# Patient Record
Sex: FEMALE | Race: WHITE | Employment: FULL TIME | URBAN - NONMETROPOLITAN AREA
[De-identification: names, ages, dates, MRNs, and addresses within clinical notes are randomized per-mention and may not be internally consistent; named-entity substitution may affect disease eponyms.]

---

## 2017-02-27 ENCOUNTER — OFFICE VISIT (OUTPATIENT)
Dept: PHYSICAL MEDICINE AND REHAB | Age: 57
End: 2017-02-27

## 2017-02-27 VITALS
WEIGHT: 160 LBS | BODY MASS INDEX: 27.31 KG/M2 | SYSTOLIC BLOOD PRESSURE: 124 MMHG | DIASTOLIC BLOOD PRESSURE: 74 MMHG | HEART RATE: 97 BPM | HEIGHT: 64 IN

## 2017-02-27 DIAGNOSIS — G35 MULTIPLE SCLEROSIS (HCC): Primary | ICD-10-CM

## 2017-02-27 PROCEDURE — 99213 OFFICE O/P EST LOW 20 MIN: CPT | Performed by: PSYCHIATRY & NEUROLOGY

## 2017-03-06 ENCOUNTER — TELEPHONE (OUTPATIENT)
Dept: PHYSICAL MEDICINE AND REHAB | Age: 57
End: 2017-03-06

## 2017-03-06 RX ORDER — AMANTADINE HYDROCHLORIDE 100 MG/1
CAPSULE, GELATIN COATED ORAL
Qty: 180 CAPSULE | Refills: 2 | Status: SHIPPED | OUTPATIENT
Start: 2017-03-06 | End: 2017-12-01 | Stop reason: SDUPTHER

## 2017-03-13 RX ORDER — DIMETHYL FUMARATE 240 MG/1
CAPSULE ORAL
Qty: 180 CAPSULE | Refills: 1 | Status: SHIPPED | OUTPATIENT
Start: 2017-03-13 | End: 2017-09-18 | Stop reason: SDUPTHER

## 2017-03-16 DIAGNOSIS — G35 MULTIPLE SCLEROSIS (HCC): ICD-10-CM

## 2017-03-16 RX ORDER — BACLOFEN 10 MG/1
10 TABLET ORAL 3 TIMES DAILY
Qty: 270 TABLET | Refills: 2 | Status: SHIPPED | OUTPATIENT
Start: 2017-03-16 | End: 2017-08-28 | Stop reason: SDUPTHER

## 2017-08-28 ENCOUNTER — OFFICE VISIT (OUTPATIENT)
Dept: NEUROLOGY | Age: 57
End: 2017-08-28
Payer: COMMERCIAL

## 2017-08-28 VITALS
SYSTOLIC BLOOD PRESSURE: 140 MMHG | HEIGHT: 64 IN | DIASTOLIC BLOOD PRESSURE: 77 MMHG | HEART RATE: 89 BPM | BODY MASS INDEX: 27.55 KG/M2 | WEIGHT: 161.4 LBS

## 2017-08-28 DIAGNOSIS — G35 MULTIPLE SCLEROSIS (HCC): ICD-10-CM

## 2017-08-28 DIAGNOSIS — G35 MULTIPLE SCLEROSIS, RELAPSING-REMITTING (HCC): Primary | ICD-10-CM

## 2017-08-28 PROCEDURE — 99213 OFFICE O/P EST LOW 20 MIN: CPT | Performed by: PSYCHIATRY & NEUROLOGY

## 2017-08-28 RX ORDER — BACLOFEN 10 MG/1
10 TABLET ORAL 3 TIMES DAILY
Qty: 270 TABLET | Refills: 2 | Status: SHIPPED | OUTPATIENT
Start: 2017-08-28 | End: 2017-09-27 | Stop reason: SDUPTHER

## 2017-08-29 ENCOUNTER — HOSPITAL ENCOUNTER (OUTPATIENT)
Age: 57
Discharge: HOME | End: 2017-08-29
Payer: COMMERCIAL

## 2017-08-29 DIAGNOSIS — G35 MULTIPLE SCLEROSIS, RELAPSING-REMITTING (HCC): ICD-10-CM

## 2017-08-29 LAB
ALBUMIN SERPL-MCNC: 4 G/DL (ref 3.5–5.1)
ALP BLD-CCNC: 67 U/L (ref 38–126)
ALT SERPL-CCNC: 10 U/L (ref 11–66)
AST SERPL-CCNC: 14 U/L (ref 5–40)
BASOPHILS # BLD: 0.4 %
BASOPHILS ABSOLUTE: 0 THOU/MM3 (ref 0–0.1)
BILIRUB SERPL-MCNC: 0.3 MG/DL (ref 0.3–1.2)
BILIRUBIN DIRECT: < 0.2 MG/DL (ref 0–0.3)
EOSINOPHIL # BLD: 3.3 %
EOSINOPHILS ABSOLUTE: 0.2 THOU/MM3 (ref 0–0.4)
HCT VFR BLD CALC: 38.3 % (ref 37–47)
HEMOGLOBIN: 13 GM/DL (ref 12–16)
LYMPHOCYTES # BLD: 24.1 %
LYMPHOCYTES ABSOLUTE: 1.4 THOU/MM3 (ref 1–4.8)
MCH RBC QN AUTO: 31.6 PG (ref 27–31)
MCHC RBC AUTO-ENTMCNC: 34 GM/DL (ref 33–37)
MCV RBC AUTO: 93.1 FL (ref 81–99)
MONOCYTES # BLD: 9.1 %
MONOCYTES ABSOLUTE: 0.5 THOU/MM3 (ref 0.4–1.3)
NUCLEATED RED BLOOD CELLS: 0 /100 WBC
PDW BLD-RTO: 14.1 % (ref 11.5–14.5)
PLATELET # BLD: 240 THOU/MM3 (ref 130–400)
PMV BLD AUTO: 8.4 MCM (ref 7.4–10.4)
RBC # BLD: 4.11 MILL/MM3 (ref 4.2–5.4)
RBC # BLD: NORMAL 10*6/UL
SEG NEUTROPHILS: 63.1 %
SEGMENTED NEUTROPHILS ABSOLUTE COUNT: 3.6 THOU/MM3 (ref 1.8–7.7)
TOTAL PROTEIN: 6.5 G/DL (ref 6.1–8)
WBC # BLD: 5.7 THOU/MM3 (ref 4.8–10.8)

## 2017-08-29 PROCEDURE — 36415 COLL VENOUS BLD VENIPUNCTURE: CPT

## 2017-08-29 PROCEDURE — 85025 COMPLETE CBC W/AUTO DIFF WBC: CPT

## 2017-08-29 PROCEDURE — 80076 HEPATIC FUNCTION PANEL: CPT

## 2017-09-05 ENCOUNTER — HOSPITAL ENCOUNTER (OUTPATIENT)
Dept: MAMMOGRAPHY | Age: 57
Discharge: HOME | End: 2017-09-05
Payer: COMMERCIAL

## 2017-09-05 DIAGNOSIS — Z12.39 BREAST SCREENING: ICD-10-CM

## 2017-09-05 PROCEDURE — G0202 SCR MAMMO BI INCL CAD: HCPCS

## 2017-09-18 RX ORDER — DIMETHYL FUMARATE 240 MG/1
CAPSULE ORAL
Qty: 180 CAPSULE | Refills: 1 | Status: SHIPPED | OUTPATIENT
Start: 2017-09-18 | End: 2018-03-05 | Stop reason: SDUPTHER

## 2017-09-27 DIAGNOSIS — G35 MULTIPLE SCLEROSIS (HCC): ICD-10-CM

## 2017-09-27 RX ORDER — BACLOFEN 10 MG/1
10 TABLET ORAL 3 TIMES DAILY
Qty: 270 TABLET | Refills: 2 | Status: SHIPPED | OUTPATIENT
Start: 2017-09-27 | End: 2018-11-28 | Stop reason: SDUPTHER

## 2017-12-01 RX ORDER — AMANTADINE HYDROCHLORIDE 100 MG/1
CAPSULE, GELATIN COATED ORAL
Qty: 180 CAPSULE | Refills: 2 | Status: SHIPPED | OUTPATIENT
Start: 2017-12-01 | End: 2018-03-05 | Stop reason: SDUPTHER

## 2018-03-05 ENCOUNTER — TELEPHONE (OUTPATIENT)
Dept: NEUROLOGY | Age: 58
End: 2018-03-05

## 2018-03-05 ENCOUNTER — HOSPITAL ENCOUNTER (OUTPATIENT)
Age: 58
Discharge: HOME | End: 2018-03-05
Payer: COMMERCIAL

## 2018-03-05 ENCOUNTER — OFFICE VISIT (OUTPATIENT)
Dept: NEUROLOGY | Age: 58
End: 2018-03-05
Payer: COMMERCIAL

## 2018-03-05 VITALS
HEIGHT: 64 IN | SYSTOLIC BLOOD PRESSURE: 138 MMHG | WEIGHT: 158 LBS | HEART RATE: 68 BPM | DIASTOLIC BLOOD PRESSURE: 72 MMHG | BODY MASS INDEX: 26.98 KG/M2

## 2018-03-05 DIAGNOSIS — G35 MULTIPLE SCLEROSIS (HCC): Primary | ICD-10-CM

## 2018-03-05 DIAGNOSIS — G35 MULTIPLE SCLEROSIS, RELAPSING-REMITTING (HCC): Primary | Chronic | ICD-10-CM

## 2018-03-05 LAB
ALBUMIN SERPL-MCNC: 4.5 G/DL (ref 3.5–5.1)
ALP BLD-CCNC: 71 U/L (ref 38–126)
ALT SERPL-CCNC: 20 U/L (ref 11–66)
AST SERPL-CCNC: 18 U/L (ref 5–40)
BASOPHILS # BLD: 0.4 %
BASOPHILS ABSOLUTE: 0 THOU/MM3 (ref 0–0.1)
BILIRUB SERPL-MCNC: 0.4 MG/DL (ref 0.3–1.2)
BILIRUBIN DIRECT: < 0.2 MG/DL (ref 0–0.3)
EOSINOPHIL # BLD: 4.7 %
EOSINOPHILS ABSOLUTE: 0.2 THOU/MM3 (ref 0–0.4)
HCT VFR BLD CALC: 40.1 % (ref 37–47)
HEMOGLOBIN: 13.5 GM/DL (ref 12–16)
LYMPHOCYTES # BLD: 29.7 %
LYMPHOCYTES ABSOLUTE: 1.5 THOU/MM3 (ref 1–4.8)
MCH RBC QN AUTO: 31.4 PG (ref 27–31)
MCHC RBC AUTO-ENTMCNC: 33.8 GM/DL (ref 33–37)
MCV RBC AUTO: 93 FL (ref 81–99)
MONOCYTES # BLD: 8.5 %
MONOCYTES ABSOLUTE: 0.4 THOU/MM3 (ref 0.4–1.3)
NUCLEATED RED BLOOD CELLS: 0 /100 WBC
PDW BLD-RTO: 13.2 % (ref 11.5–14.5)
PLATELET # BLD: 189 THOU/MM3 (ref 130–400)
PMV BLD AUTO: 7.7 FL (ref 7.4–10.4)
RBC # BLD: 4.31 MILL/MM3 (ref 4.2–5.4)
SEG NEUTROPHILS: 56.7 %
SEGMENTED NEUTROPHILS ABSOLUTE COUNT: 2.8 THOU/MM3 (ref 1.8–7.7)
TOTAL PROTEIN: 7.1 G/DL (ref 6.1–8)
WBC # BLD: 4.9 THOU/MM3 (ref 4.8–10.8)

## 2018-03-05 PROCEDURE — 85025 COMPLETE CBC W/AUTO DIFF WBC: CPT

## 2018-03-05 PROCEDURE — 36415 COLL VENOUS BLD VENIPUNCTURE: CPT

## 2018-03-05 PROCEDURE — 80076 HEPATIC FUNCTION PANEL: CPT

## 2018-03-05 PROCEDURE — 99213 OFFICE O/P EST LOW 20 MIN: CPT | Performed by: PSYCHIATRY & NEUROLOGY

## 2018-03-05 RX ORDER — AMANTADINE HYDROCHLORIDE 100 MG/1
CAPSULE, GELATIN COATED ORAL
Qty: 180 CAPSULE | Refills: 2 | Status: SHIPPED | OUTPATIENT
Start: 2018-03-05 | End: 2018-09-10 | Stop reason: SDUPTHER

## 2018-03-05 NOTE — PATIENT INSTRUCTIONS
1. Continue with current dose of Tecfidera. Refills given. 2. CBC and hepatic panel noted. 3. Continue calcium and vitamin D.  4. Report any new events. 5.  Follow up in 6 months or sooner if needed.

## 2018-03-05 NOTE — TELEPHONE ENCOUNTER
Patient has appt today. Per Dr. Hannah Emerson, order CBC and HFP for pt to complete prior to appt. LM for pt to return call to see if she could get labs done.

## 2018-03-05 NOTE — PROGRESS NOTES
cerebellar peduncle and portions of the medial left middle cerebellar peduncle with minimal involvement of the inferior left cerebellar peduncle. There is    minimal involvement of the floor of the left aspect of the fourth ventricle, also. These abnormalities have not changed since the prior exam, and there are no areas of definite interval additional white matter lesion development. Small foci of    hyperintense T2 signal abnormalities are possibly present adjacent to the frontal horns of both lateral ventricles, though difficult to confirm. Small focus of hyperintense T2 signal abnormality also in the right frontal lobe white matter appears similar    to the prior.       Essentially stable white matter characteristics of the brain parenchyma throughout demonstrated.       Cerebral cortical surfaces are within acceptable limits. No evidence of cerebral cortical signal abnormality.       The ventricles and the extra-axial spaces are within acceptable limits.       Vascular structures are grossly unremarkable.       Limited detail of the cervical canal and spinal cord and sella appear unremarkable.       The paranasal sinuses, mastoid air cells, and middle ear cavities are grossly clear.       Previously demonstrated subcutaneous lesions of the posterior right calvarium are no longer present.           Impression   NO EVIDENCE OF ACUTE BRAIN PROCESS.       STABLE EXAMINATION WITH WHITE MATTER ABNORMALITIES THAT ARE PREDOMINANTLY INVOLVING THE WHITE MATTER OF THE POSTERIOR FOSSA, SHOWING NO EVIDENCE OF INTERVAL ADDITIONAL LESION DEVELOPMENT SINCE THE PRIOR EXAM NOR FINDINGS THAT WOULD INDICATE ACTIVE    DEMYELINATING DISEASE PLAQUE. Assessment:  1. Multiple sclerosis ([address]         The patient is doing well. She denies any new events. She has unchanged exam. She is on Tecfidera, no side effects to the medication. Her multiple sclerosis symptoms are stable.  After a detailed discussion with patient and her  we agreed on the following plan. Plan:   1. Continue with current dose of Tecfidera. Refills given. 2. CBC and hepatic panel noted from today, normal.   3. Continue calcium and vitamin D.  4. Report any new events. 5.  Follow up in 6 months or sooner if needed. Please call if any questions.      Rhys Ashby MD

## 2018-03-06 RX ORDER — DIMETHYL FUMARATE 240 MG/1
CAPSULE ORAL
Qty: 180 CAPSULE | Refills: 1 | Status: SHIPPED | OUTPATIENT
Start: 2018-03-06 | End: 2018-09-02 | Stop reason: SDUPTHER

## 2018-09-04 RX ORDER — DIMETHYL FUMARATE 240 MG/1
CAPSULE ORAL
Qty: 180 CAPSULE | Refills: 1 | Status: SHIPPED | OUTPATIENT
Start: 2018-09-04 | End: 2019-02-26 | Stop reason: SDUPTHER

## 2018-09-06 ENCOUNTER — HOSPITAL ENCOUNTER (OUTPATIENT)
Dept: MAMMOGRAPHY | Age: 58
Discharge: HOME | End: 2018-09-06
Payer: COMMERCIAL

## 2018-09-06 DIAGNOSIS — Z12.39 BREAST CANCER SCREENING: ICD-10-CM

## 2018-09-06 LAB
ALBUMIN SERPL-MCNC: 4.6 G/DL (ref 3.5–5.1)
ALP BLD-CCNC: 86 U/L (ref 38–126)
ALT SERPL-CCNC: 16 U/L (ref 11–66)
AST SERPL-CCNC: 16 U/L (ref 5–40)
BASOPHILS # BLD: 0.4 %
BASOPHILS ABSOLUTE: 0 THOU/MM3 (ref 0–0.1)
BILIRUB SERPL-MCNC: 0.4 MG/DL (ref 0.3–1.2)
BILIRUBIN DIRECT: < 0.2 MG/DL (ref 0–0.3)
EOSINOPHIL # BLD: 3.8 %
EOSINOPHILS ABSOLUTE: 0.2 THOU/MM3 (ref 0–0.4)
ERYTHROCYTE [DISTWIDTH] IN BLOOD BY AUTOMATED COUNT: 13.5 % (ref 11.5–14.5)
ERYTHROCYTE [DISTWIDTH] IN BLOOD BY AUTOMATED COUNT: 48.2 FL (ref 35–45)
HCT VFR BLD CALC: 40.3 % (ref 37–47)
HEMOGLOBIN: 12.9 GM/DL (ref 12–16)
IMMATURE GRANS (ABS): 0.01 THOU/MM3 (ref 0–0.07)
IMMATURE GRANULOCYTES: 0.2 %
LYMPHOCYTES # BLD: 34 %
LYMPHOCYTES ABSOLUTE: 1.7 THOU/MM3 (ref 1–4.8)
MCH RBC QN AUTO: 31.5 PG (ref 26–33)
MCHC RBC AUTO-ENTMCNC: 32 GM/DL (ref 32.2–35.5)
MCV RBC AUTO: 98.3 FL (ref 81–99)
MONOCYTES # BLD: 8.5 %
MONOCYTES ABSOLUTE: 0.4 THOU/MM3 (ref 0.4–1.3)
NUCLEATED RED BLOOD CELLS: 0 /100 WBC
PLATELET # BLD: 243 THOU/MM3 (ref 130–400)
PMV BLD AUTO: 9.6 FL (ref 9.4–12.4)
RBC # BLD: 4.1 MILL/MM3 (ref 4.2–5.4)
SEG NEUTROPHILS: 53.1 %
SEGMENTED NEUTROPHILS ABSOLUTE COUNT: 2.6 THOU/MM3 (ref 1.8–7.7)
TOTAL PROTEIN: 7.1 G/DL (ref 6.1–8)
WBC # BLD: 4.9 THOU/MM3 (ref 4.8–10.8)

## 2018-09-06 PROCEDURE — 85025 COMPLETE CBC W/AUTO DIFF WBC: CPT

## 2018-09-06 PROCEDURE — 80076 HEPATIC FUNCTION PANEL: CPT

## 2018-09-06 PROCEDURE — 36415 COLL VENOUS BLD VENIPUNCTURE: CPT

## 2018-09-06 PROCEDURE — 77063 BREAST TOMOSYNTHESIS BI: CPT

## 2018-09-10 ENCOUNTER — OFFICE VISIT (OUTPATIENT)
Dept: NEUROLOGY | Age: 58
End: 2018-09-10
Payer: COMMERCIAL

## 2018-09-10 VITALS
DIASTOLIC BLOOD PRESSURE: 82 MMHG | HEIGHT: 64 IN | HEART RATE: 82 BPM | SYSTOLIC BLOOD PRESSURE: 136 MMHG | WEIGHT: 158 LBS | BODY MASS INDEX: 26.98 KG/M2

## 2018-09-10 DIAGNOSIS — G35 MULTIPLE SCLEROSIS, RELAPSING-REMITTING (HCC): Primary | ICD-10-CM

## 2018-09-10 PROCEDURE — 99213 OFFICE O/P EST LOW 20 MIN: CPT | Performed by: PSYCHIATRY & NEUROLOGY

## 2018-09-10 RX ORDER — AMANTADINE HYDROCHLORIDE 100 MG/1
CAPSULE, GELATIN COATED ORAL
Qty: 180 CAPSULE | Refills: 2 | Status: SHIPPED | OUTPATIENT
Start: 2018-09-10 | End: 2019-10-28 | Stop reason: SDUPTHER

## 2018-09-10 NOTE — PATIENT INSTRUCTIONS
1.  Continue with current dose of Tecfidera. Refills given. 2. CBC and hepatic panel noted from today, normal.   3. Continue calcium and vitamin D.  4. Report any new events. 5.  Follow up in 6 months or sooner if needed.

## 2018-09-10 NOTE — PROGRESS NOTES
are within acceptable limits.       Vascular structures are grossly unremarkable.       Limited detail of the cervical canal and spinal cord and sella appear unremarkable.       The paranasal sinuses, mastoid air cells, and middle ear cavities are grossly clear.       Previously demonstrated subcutaneous lesions of the posterior right calvarium are no longer present.           Impression   NO EVIDENCE OF ACUTE BRAIN PROCESS.       STABLE EXAMINATION WITH WHITE MATTER ABNORMALITIES THAT ARE PREDOMINANTLY INVOLVING THE WHITE MATTER OF THE POSTERIOR FOSSA, SHOWING NO EVIDENCE OF INTERVAL ADDITIONAL LESION DEVELOPMENT SINCE THE PRIOR EXAM NOR FINDINGS THAT WOULD INDICATE ACTIVE    DEMYELINATING DISEASE PLAQUE. Assessment:   Diagnosis Orders   1. Multiple sclerosis, relapsing-remitting ([facility] [address]         The patient is doing well. She denies any new events. She has unchanged exam. She is on Tecfidera, no side effects to the medication. Her multiple sclerosis symptoms are stable. After a detailed discussion with patient and her  we agreed on the following plan. Plan:   1. Continue with current dose of Tecfidera. Refills given. 2. CBC and hepatic panel noted from today, normal.   3. Continue calcium and vitamin D.  4. Report any new events. 5.  Follow up in 6 months or sooner if needed. Please call if any questions.      Fabby Carter MD

## 2018-11-04 ENCOUNTER — HOSPITAL ENCOUNTER (EMERGENCY)
Age: 58
Discharge: HOME | End: 2018-11-04
Attending: FAMILY MEDICINE
Payer: COMMERCIAL

## 2018-11-04 VITALS
RESPIRATION RATE: 18 BRPM | WEIGHT: 147 LBS | OXYGEN SATURATION: 97 % | BODY MASS INDEX: 26.05 KG/M2 | DIASTOLIC BLOOD PRESSURE: 63 MMHG | HEART RATE: 112 BPM | HEIGHT: 63 IN | SYSTOLIC BLOOD PRESSURE: 128 MMHG | TEMPERATURE: 99.3 F

## 2018-11-04 DIAGNOSIS — J06.9 VIRAL URI WITH COUGH: Primary | ICD-10-CM

## 2018-11-04 PROCEDURE — 99283 EMERGENCY DEPT VISIT LOW MDM: CPT

## 2018-11-04 RX ORDER — GUAIFENESIN AND CODEINE PHOSPHATE 100; 10 MG/5ML; MG/5ML
5 SOLUTION ORAL 3 TIMES DAILY PRN
Qty: 1 BOTTLE | Refills: 0 | Status: SHIPPED | OUTPATIENT
Start: 2018-11-04 | End: 2018-11-11

## 2018-11-04 ASSESSMENT — ENCOUNTER SYMPTOMS
NAUSEA: 0
DIARRHEA: 0
SINUS PRESSURE: 0
CHEST TIGHTNESS: 0
EYE REDNESS: 0
EYE DISCHARGE: 0
SORE THROAT: 0
COLOR CHANGE: 0
BACK PAIN: 0
COUGH: 1
RHINORRHEA: 1
WHEEZING: 0
SHORTNESS OF BREATH: 0
ABDOMINAL PAIN: 0
VOMITING: 0

## 2018-11-13 ENCOUNTER — HOSPITAL ENCOUNTER (OUTPATIENT)
Age: 58
Discharge: HOME | End: 2018-11-13
Payer: COMMERCIAL

## 2018-11-13 ENCOUNTER — HOSPITAL ENCOUNTER (OUTPATIENT)
Dept: GENERAL RADIOLOGY | Age: 58
Discharge: HOME | End: 2018-11-13
Payer: COMMERCIAL

## 2018-11-13 DIAGNOSIS — R05.9 COUGH: ICD-10-CM

## 2018-11-13 PROCEDURE — 71046 X-RAY EXAM CHEST 2 VIEWS: CPT

## 2018-11-24 ENCOUNTER — HOSPITAL ENCOUNTER (OUTPATIENT)
Dept: GENERAL RADIOLOGY | Age: 58
Discharge: HOME | End: 2018-11-24
Payer: COMMERCIAL

## 2018-11-24 ENCOUNTER — HOSPITAL ENCOUNTER (OUTPATIENT)
Age: 58
Discharge: HOME | End: 2018-11-24
Payer: COMMERCIAL

## 2018-11-24 DIAGNOSIS — J18.9 PNEUMONIA DUE TO INFECTIOUS ORGANISM, UNSPECIFIED LATERALITY, UNSPECIFIED PART OF LUNG: ICD-10-CM

## 2018-11-24 PROCEDURE — 71046 X-RAY EXAM CHEST 2 VIEWS: CPT

## 2018-11-28 DIAGNOSIS — G35 MULTIPLE SCLEROSIS (HCC): ICD-10-CM

## 2018-11-28 RX ORDER — BACLOFEN 10 MG/1
10 TABLET ORAL 3 TIMES DAILY
Qty: 270 TABLET | Refills: 2 | Status: SHIPPED | OUTPATIENT
Start: 2018-11-28 | End: 2019-10-27 | Stop reason: SDUPTHER

## 2018-12-05 ENCOUNTER — TELEPHONE (OUTPATIENT)
Dept: NEUROLOGY | Age: 58
End: 2018-12-05

## 2018-12-10 ENCOUNTER — OFFICE VISIT (OUTPATIENT)
Dept: NEUROLOGY | Age: 58
End: 2018-12-10
Payer: COMMERCIAL

## 2018-12-10 VITALS
HEART RATE: 78 BPM | BODY MASS INDEX: 26.22 KG/M2 | WEIGHT: 148 LBS | HEIGHT: 63 IN | SYSTOLIC BLOOD PRESSURE: 136 MMHG | DIASTOLIC BLOOD PRESSURE: 82 MMHG

## 2018-12-10 DIAGNOSIS — G35 MULTIPLE SCLEROSIS (HCC): Primary | ICD-10-CM

## 2018-12-10 PROCEDURE — 99213 OFFICE O/P EST LOW 20 MIN: CPT | Performed by: PSYCHIATRY & NEUROLOGY

## 2018-12-14 ENCOUNTER — HOSPITAL ENCOUNTER (OUTPATIENT)
Dept: MRI IMAGING | Age: 58
Discharge: HOME | End: 2018-12-14
Payer: COMMERCIAL

## 2018-12-14 ENCOUNTER — HOSPITAL ENCOUNTER (OUTPATIENT)
Age: 58
Discharge: HOME | End: 2018-12-14
Payer: COMMERCIAL

## 2018-12-14 DIAGNOSIS — G35 MULTIPLE SCLEROSIS (HCC): ICD-10-CM

## 2018-12-14 LAB
ALBUMIN SERPL-MCNC: 4 G/DL (ref 3.5–5.1)
ALP BLD-CCNC: 73 U/L (ref 38–126)
ALT SERPL-CCNC: 14 U/L (ref 11–66)
AST SERPL-CCNC: 15 U/L (ref 5–40)
BASOPHILS # BLD: 0.4 %
BASOPHILS ABSOLUTE: 0 THOU/MM3 (ref 0–0.1)
BILIRUB SERPL-MCNC: 0.4 MG/DL (ref 0.3–1.2)
BILIRUBIN DIRECT: < 0.2 MG/DL (ref 0–0.3)
EOSINOPHIL # BLD: 3.6 %
EOSINOPHILS ABSOLUTE: 0.2 THOU/MM3 (ref 0–0.4)
ERYTHROCYTE [DISTWIDTH] IN BLOOD BY AUTOMATED COUNT: 14 % (ref 11.5–14.5)
ERYTHROCYTE [DISTWIDTH] IN BLOOD BY AUTOMATED COUNT: 50.8 FL (ref 35–45)
HCT VFR BLD CALC: 40.2 % (ref 37–47)
HEMOGLOBIN: 12.7 GM/DL (ref 12–16)
IMMATURE GRANS (ABS): 0.01 THOU/MM3 (ref 0–0.07)
IMMATURE GRANULOCYTES: 0.2 %
LYMPHOCYTES # BLD: 33.6 %
LYMPHOCYTES ABSOLUTE: 1.5 THOU/MM3 (ref 1–4.8)
MCH RBC QN AUTO: 31.5 PG (ref 26–33)
MCHC RBC AUTO-ENTMCNC: 31.6 GM/DL (ref 32.2–35.5)
MCV RBC AUTO: 99.8 FL (ref 81–99)
MONOCYTES # BLD: 10.8 %
MONOCYTES ABSOLUTE: 0.5 THOU/MM3 (ref 0.4–1.3)
NUCLEATED RED BLOOD CELLS: 0 /100 WBC
PLATELET # BLD: 234 THOU/MM3 (ref 130–400)
PMV BLD AUTO: 9.6 FL (ref 9.4–12.4)
RBC # BLD: 4.03 MILL/MM3 (ref 4.2–5.4)
SEG NEUTROPHILS: 51.4 %
SEGMENTED NEUTROPHILS ABSOLUTE COUNT: 2.3 THOU/MM3 (ref 1.8–7.7)
TOTAL PROTEIN: 6.4 G/DL (ref 6.1–8)
WBC # BLD: 4.5 THOU/MM3 (ref 4.8–10.8)

## 2018-12-14 PROCEDURE — 36415 COLL VENOUS BLD VENIPUNCTURE: CPT

## 2018-12-14 PROCEDURE — 70553 MRI BRAIN STEM W/O & W/DYE: CPT

## 2018-12-14 PROCEDURE — 80076 HEPATIC FUNCTION PANEL: CPT

## 2018-12-14 PROCEDURE — 6360000004 HC RX CONTRAST MEDICATION: Performed by: PSYCHIATRY & NEUROLOGY

## 2018-12-14 PROCEDURE — A9579 GAD-BASE MR CONTRAST NOS,1ML: HCPCS | Performed by: PSYCHIATRY & NEUROLOGY

## 2018-12-14 PROCEDURE — 85025 COMPLETE CBC W/AUTO DIFF WBC: CPT

## 2018-12-14 RX ADMIN — GADOTERIDOL 15 ML: 279.3 INJECTION, SOLUTION INTRAVENOUS at 12:58

## 2019-02-26 DIAGNOSIS — G35 MULTIPLE SCLEROSIS (HCC): Primary | ICD-10-CM

## 2019-02-26 RX ORDER — DIMETHYL FUMARATE 240 MG/1
CAPSULE ORAL
Qty: 180 CAPSULE | Refills: 1 | Status: SHIPPED | OUTPATIENT
Start: 2019-02-26 | End: 2019-09-01 | Stop reason: SDUPTHER

## 2019-06-10 ENCOUNTER — OFFICE VISIT (OUTPATIENT)
Dept: NEUROLOGY | Age: 59
End: 2019-06-10
Payer: COMMERCIAL

## 2019-06-10 VITALS
WEIGHT: 148 LBS | BODY MASS INDEX: 26.22 KG/M2 | DIASTOLIC BLOOD PRESSURE: 72 MMHG | HEIGHT: 63 IN | SYSTOLIC BLOOD PRESSURE: 118 MMHG | HEART RATE: 80 BPM

## 2019-06-10 DIAGNOSIS — G35 MULTIPLE SCLEROSIS (HCC): Primary | ICD-10-CM

## 2019-06-10 PROCEDURE — 99213 OFFICE O/P EST LOW 20 MIN: CPT | Performed by: PSYCHIATRY & NEUROLOGY

## 2019-06-10 NOTE — PROGRESS NOTES
NEUROLOGY OUT PATIENT FOLLOW UP NOTE:  6/10/657295:37 PM    Audra Cadena is here for follow up for   Patient Active Problem List   Diagnosis    Multiple sclerosis, relapsing-remitting ([facility] [address]    Follow up for Multiple sclerosis relapsing. She is doing well. No new symptoms. She is pleased with how she is doing. She is on the Tecfidera. Comes in with family. She is doing well. She is also on amantadine, baclofen. She is also on calcium and vitamin D. No falls. No new numbness. She is here to go over medications and plan. ROS:  Skin: no rash  Cardiac: no chest pain. No palpitations. Renal : no flank pain, no hematuria        No Known Allergies        PE:     Vitals:    06/10/19 1232   BP: 118/72   Site: Right Upper Arm   Position: Sitting   Cuff Size: Medium Adult   Pulse: 80   Weight: 148 lb (67.1 kg)   Height: 5' 3\" (1.6 m)        General Appearance: awake, alert, cooperative, well nourished and cooperative . Language is Intact. Neck:  The Neck is supple. Neuro: CN 2-12 left lateral gaze limitation. Otherwise, grossly intact with no focal deficits. Power mild left hemiparesis 4/5 , right 5/5 arm and leg. Reflexes are symmetric. Long tracts are intact. Cerebellar exam is Intact on the right. Sensory exam is intact to light touch. Gait is limping.      Results for orders placed or performed during the hospital encounter of 12/14/18   CBC Auto Differential   Result Value Ref Range    WBC 4.5 (L) 4.8 - 10.8 thou/mm3    RBC 4.03 (L) 4.20 - 5.40 mill/mm3    Hemoglobin 12.7 12.0 - 16.0 gm/dl    Hematocrit 40.2 37.0 - 47.0 %    MCV 99.8 (H) 81.0 - 99.0 fL    MCH 31.5 26.0 - 33.0 pg    MCHC 31.6 (L) 32.2 - 35.5 gm/dl    RDW-CV 14.0 11.5 - 14.5 %    RDW-SD 50.8 (H) 35.0 - 45.0 fL    Platelets 634 869 - 873 thou/mm3    MPV 9.6 9.4 - 12.4 fL    Seg Neutrophils 51.4 %    Lymphocytes 33.6 %    Monocytes 10.8 %    Eosinophils 3.6 %    Basophils 0.4 %    Immature Granulocytes 0.2 %    Segs Absolute 2.3 1.8 - 7.7 thou/mm3 Lymphocytes # 1.5 1.0 - 4.8 thou/mm3    Monocytes # 0.5 0.4 - 1.3 thou/mm3    Eosinophils # 0.2 0.0 - 0.4 thou/mm3    Basophils # 0.0 0.0 - 0.1 thou/mm3    Immature Grans (Abs) 0.01 0.00 - 0.07 thou/mm3    nRBC 0 /100 wbc   Hepatic Function Panel   Result Value Ref Range    Alb 4.0 3.5 - 5.1 g/dL    Total Bilirubin 0.4 0.3 - 1.2 mg/dL    Bilirubin, Direct <0.2 0.0 - 0.3 mg/dL    Alkaline Phosphatase 73 38 - 126 U/L    AST 15 5 - 40 U/L    ALT 14 11 - 66 U/L    Total Protein 6.4 6.1 - 8.0 g/dL      MRI Brain 3/2017  I reviewed the MRI brain and agree with interpretation. FINDINGS: The global brain volume is within normal limits, stable.       There are no areas of restricted diffusion. There are no areas of convincing parenchymal edema. No findings of blood products in the brain parenchyma.       No areas of convincing pathologic contrast enhancement are demonstrated in any portion of the brain parenchyma.       There are redemonstrated areas of hyperintense T2 signal abnormality involving the left superior cerebellar peduncle and portions of the medial left middle cerebellar peduncle with minimal involvement of the inferior left cerebellar peduncle. There is    minimal involvement of the floor of the left aspect of the fourth ventricle, also. These abnormalities have not changed since the prior exam, and there are no areas of definite interval additional white matter lesion development. Small foci of    hyperintense T2 signal abnormalities are possibly present adjacent to the frontal horns of both lateral ventricles, though difficult to confirm. Small focus of hyperintense T2 signal abnormality also in the right frontal lobe white matter appears similar    to the prior.       Essentially stable white matter characteristics of the brain parenchyma throughout demonstrated.       Cerebral cortical surfaces are within acceptable limits.  No evidence of cerebral cortical signal abnormality.       The ventricles and the extra-axial spaces are within acceptable limits.       Vascular structures are grossly unremarkable.       Limited detail of the cervical canal and spinal cord and sella appear unremarkable.       The paranasal sinuses, mastoid air cells, and middle ear cavities are grossly clear.       Previously demonstrated subcutaneous lesions of the posterior right calvarium are no longer present.           Impression   NO EVIDENCE OF ACUTE BRAIN PROCESS.       STABLE EXAMINATION WITH WHITE MATTER ABNORMALITIES THAT ARE PREDOMINANTLY INVOLVING THE WHITE MATTER OF THE POSTERIOR FOSSA, SHOWING NO EVIDENCE OF INTERVAL ADDITIONAL LESION DEVELOPMENT SINCE THE PRIOR EXAM NOR FINDINGS THAT WOULD INDICATE ACTIVE    DEMYELINATING DISEASE PLAQUE. MRI Brain 12/14/2019:     Impression       1. Minimal amount of signal hyperintensity in the white matter the brain this is stable. No evidence of active demyelination.                 Assessment:   Diagnosis Orders   1. Multiple sclerosis ([address]          She reports doing well. No side effects to the medications. Her exam is unchanged. She got better after increasing the Baclofen to 2 per day. She is on the Tecfidera. She denies any new events. She has unchanged exam. Her multiple sclerosis symptoms are stable. After a detailed discussion with patient and her  we agreed on the following plan. Plan:   1. Continue with current dose of Tecfidera. Refills given. 2. CBC and hepatic panel noted from today, normal.   3. Continue calcium and vitamin D.  4. Report any new events. 5.  Follow up in 6 months or sooner if needed. Please call if any questions.      Waqas Benitez MD

## 2019-08-07 ENCOUNTER — TELEPHONE (OUTPATIENT)
Dept: FAMILY MEDICINE CLINIC | Age: 59
End: 2019-08-07

## 2019-08-07 DIAGNOSIS — Z00.00 ROUTINE GENERAL MEDICAL EXAMINATION AT A HEALTH CARE FACILITY: Primary | ICD-10-CM

## 2019-09-01 DIAGNOSIS — G35 MULTIPLE SCLEROSIS (HCC): Primary | ICD-10-CM

## 2019-09-05 ENCOUNTER — NURSE ONLY (OUTPATIENT)
Dept: LAB | Age: 59
End: 2019-09-05

## 2019-09-05 DIAGNOSIS — Z00.00 ROUTINE GENERAL MEDICAL EXAMINATION AT A HEALTH CARE FACILITY: ICD-10-CM

## 2019-09-05 DIAGNOSIS — G35 MULTIPLE SCLEROSIS (HCC): ICD-10-CM

## 2019-09-05 LAB
BASOPHILS # BLD: 0.6 %
BASOPHILS ABSOLUTE: 0 THOU/MM3 (ref 0–0.1)
EOSINOPHIL # BLD: 4.2 %
EOSINOPHILS ABSOLUTE: 0.2 THOU/MM3 (ref 0–0.4)
ERYTHROCYTE [DISTWIDTH] IN BLOOD BY AUTOMATED COUNT: 13.3 % (ref 11.5–14.5)
ERYTHROCYTE [DISTWIDTH] IN BLOOD BY AUTOMATED COUNT: 46.9 FL (ref 35–45)
HCT VFR BLD CALC: 41.4 % (ref 37–47)
HEMOGLOBIN: 13.2 GM/DL (ref 12–16)
IMMATURE GRANS (ABS): 0.01 THOU/MM3 (ref 0–0.07)
IMMATURE GRANULOCYTES: 0 %
LYMPHOCYTES # BLD: 39.3 %
LYMPHOCYTES ABSOLUTE: 1.9 THOU/MM3 (ref 1–4.8)
MCH RBC QN AUTO: 30.6 PG (ref 26–33)
MCHC RBC AUTO-ENTMCNC: 31.9 GM/DL (ref 32.2–35.5)
MCV RBC AUTO: 96.1 FL (ref 81–99)
MONOCYTES # BLD: 9 %
MONOCYTES ABSOLUTE: 0.4 THOU/MM3 (ref 0.4–1.3)
NUCLEATED RED BLOOD CELLS: 0 /100 WBC
PLATELET # BLD: 241 THOU/MM3 (ref 130–400)
PMV BLD AUTO: 9.7 FL (ref 9.4–12.4)
RBC # BLD: 4.31 MILL/MM3 (ref 4.2–5.4)
SEG NEUTROPHILS: 46.7 %
SEGMENTED NEUTROPHILS ABSOLUTE COUNT: 2.2 THOU/MM3 (ref 1.8–7.7)
WBC # BLD: 4.8 THOU/MM3 (ref 4.8–10.8)

## 2019-09-06 ENCOUNTER — HOSPITAL ENCOUNTER (OUTPATIENT)
Dept: MAMMOGRAPHY | Age: 59
Discharge: HOME | End: 2019-09-06
Payer: COMMERCIAL

## 2019-09-06 DIAGNOSIS — Z12.39 BREAST SCREENING: ICD-10-CM

## 2019-09-06 LAB
ALBUMIN SERPL-MCNC: 4.5 G/DL (ref 3.5–5.1)
ALP BLD-CCNC: 80 U/L (ref 38–126)
ALT SERPL-CCNC: 12 U/L (ref 11–66)
ANION GAP SERPL CALCULATED.3IONS-SCNC: 12 MEQ/L (ref 8–16)
AST SERPL-CCNC: 15 U/L (ref 5–40)
BILIRUB SERPL-MCNC: 0.3 MG/DL (ref 0.3–1.2)
BILIRUBIN DIRECT: < 0.2 MG/DL (ref 0–0.3)
BUN BLDV-MCNC: 12 MG/DL (ref 7–22)
CALCIUM SERPL-MCNC: 9.9 MG/DL (ref 8.5–10.5)
CHLORIDE BLD-SCNC: 105 MEQ/L (ref 98–111)
CHOLESTEROL, TOTAL: 149 MG/DL (ref 100–199)
CO2: 28 MEQ/L (ref 23–33)
CREAT SERPL-MCNC: 0.5 MG/DL (ref 0.4–1.2)
GFR SERPL CREATININE-BSD FRML MDRD: > 90 ML/MIN/1.73M2
GLUCOSE BLD-MCNC: 93 MG/DL (ref 70–108)
HDLC SERPL-MCNC: 89 MG/DL
LDL CHOLESTEROL CALCULATED: 54 MG/DL
POTASSIUM SERPL-SCNC: 4.2 MEQ/L (ref 3.5–5.2)
SODIUM BLD-SCNC: 145 MEQ/L (ref 135–145)
TOTAL PROTEIN: 6.8 G/DL (ref 6.1–8)
TRIGL SERPL-MCNC: 30 MG/DL (ref 0–199)
TSH SERPL DL<=0.05 MIU/L-ACNC: 0.92 UIU/ML (ref 0.4–4.2)

## 2019-09-06 RX ORDER — DIMETHYL FUMARATE 240 MG/1
CAPSULE ORAL
Qty: 180 CAPSULE | Refills: 1 | Status: SHIPPED | OUTPATIENT
Start: 2019-09-06 | End: 2020-01-03 | Stop reason: SDUPTHER

## 2019-09-11 ENCOUNTER — HOSPITAL ENCOUNTER (OUTPATIENT)
Dept: MAMMOGRAPHY | Age: 59
Discharge: HOME | End: 2019-09-11
Payer: COMMERCIAL

## 2019-09-11 ENCOUNTER — OFFICE VISIT (OUTPATIENT)
Dept: FAMILY MEDICINE CLINIC | Age: 59
End: 2019-09-11
Payer: COMMERCIAL

## 2019-09-11 VITALS
HEIGHT: 63 IN | SYSTOLIC BLOOD PRESSURE: 118 MMHG | HEART RATE: 94 BPM | WEIGHT: 162.8 LBS | DIASTOLIC BLOOD PRESSURE: 82 MMHG | OXYGEN SATURATION: 98 % | BODY MASS INDEX: 28.84 KG/M2

## 2019-09-11 DIAGNOSIS — Z12.39 SCREENING BREAST EXAMINATION: ICD-10-CM

## 2019-09-11 DIAGNOSIS — G89.29 CHRONIC RIGHT SHOULDER PAIN: ICD-10-CM

## 2019-09-11 DIAGNOSIS — Z00.00 ROUTINE GENERAL MEDICAL EXAMINATION AT A HEALTH CARE FACILITY: Primary | ICD-10-CM

## 2019-09-11 DIAGNOSIS — G35 MULTIPLE SCLEROSIS, RELAPSING-REMITTING (HCC): ICD-10-CM

## 2019-09-11 DIAGNOSIS — M25.511 CHRONIC RIGHT SHOULDER PAIN: ICD-10-CM

## 2019-09-11 PROCEDURE — 77063 BREAST TOMOSYNTHESIS BI: CPT

## 2019-09-11 PROCEDURE — 99204 OFFICE O/P NEW MOD 45 MIN: CPT | Performed by: FAMILY MEDICINE

## 2019-09-11 RX ORDER — CYCLOBENZAPRINE HCL 10 MG
TABLET ORAL
COMMUNITY
Start: 2019-08-20 | End: 2021-04-13 | Stop reason: SDUPTHER

## 2019-09-11 ASSESSMENT — PATIENT HEALTH QUESTIONNAIRE - PHQ9
SUM OF ALL RESPONSES TO PHQ QUESTIONS 1-9: 0
2. FEELING DOWN, DEPRESSED OR HOPELESS: 0
1. LITTLE INTEREST OR PLEASURE IN DOING THINGS: 0
SUM OF ALL RESPONSES TO PHQ9 QUESTIONS 1 & 2: 0
SUM OF ALL RESPONSES TO PHQ QUESTIONS 1-9: 0

## 2019-09-11 NOTE — PROGRESS NOTES
118/82   Site: Left Upper Arm   Position: Sitting   Cuff Size: Medium Adult   Pulse: 94   SpO2: 98%   Weight: 162 lb 12.8 oz (73.8 kg)   Height: 5' 3\" (1.6 m)     Wt Readings from Last 3 Encounters:   09/11/19 162 lb 12.8 oz (73.8 kg)   06/10/19 148 lb (67.1 kg)   12/10/18 148 lb (67.1 kg)       Physical Exam  Constitutional: Vital signs are normal. She appears well-developed and well-nourished. She is active. HENT:   Head: Normocephalic and atraumatic. Right Ear: Tympanic membrane, external ear and ear canal normal. No drainage or tenderness. Left Ear: Tympanic membrane, external ear and ear canal normal. No drainage or tenderness. Nose: Nose normal. No mucosal edema or rhinorrhea. Mouth/Throat: Uvula is midline, oropharynx is clear and moist and mucous membranes are normal. Mucous membranes are not pale. Normal dentition. No posterior oropharyngeal edema or posterior oropharyngeal erythema. Eyes: Lids are normal. Right eye exhibits no chemosis and no discharge. Left eye exhibits no chemosis and no drainage. Right conjunctiva has no hemorrhage. Left conjunctiva has no hemorrhage. Right eye exhibits normal extraocular motion. Left eye exhibits normal extraocular motion. Right pupil is round and reactive. Left pupil is round and reactive. Pupils are equal.   Cardiovascular: Normal rate, regular rhythm, S1 normal, S2 normal and normal heart sounds. Exam reveals no gallop. No murmur heard. Pulmonary/Chest: Effort normal and breath sounds normal. No respiratory distress. She has no wheezes. She has no rhonchi. She has no rales. Abdominal: Soft. Normal appearance and bowel sounds are normal. She exhibits no distension and no mass. There is no hepatosplenomegaly. No tenderness. She has no rigidity, no rebound and no guarding. No hernia. Musculoskeletal:        Right lower leg: She exhibits no edema. Left lower leg: She exhibits no edema. Neurological: She is alert.            Assessment/Plan

## 2019-10-14 ENCOUNTER — TELEPHONE (OUTPATIENT)
Dept: FAMILY MEDICINE CLINIC | Age: 59
End: 2019-10-14

## 2019-10-27 DIAGNOSIS — G35 MULTIPLE SCLEROSIS (HCC): Primary | ICD-10-CM

## 2019-10-28 DIAGNOSIS — G35 MULTIPLE SCLEROSIS (HCC): Primary | ICD-10-CM

## 2019-10-28 RX ORDER — BACLOFEN 10 MG/1
TABLET ORAL
Qty: 270 TABLET | Refills: 2 | Status: SHIPPED | OUTPATIENT
Start: 2019-10-28 | End: 2020-01-23 | Stop reason: SDUPTHER

## 2019-10-28 RX ORDER — AMANTADINE HYDROCHLORIDE 100 MG/1
CAPSULE, GELATIN COATED ORAL
Qty: 180 CAPSULE | Refills: 2 | Status: SHIPPED | OUTPATIENT
Start: 2019-10-28 | End: 2020-01-23 | Stop reason: SDUPTHER

## 2019-10-31 ENCOUNTER — TELEPHONE (OUTPATIENT)
Dept: NEUROLOGY | Age: 59
End: 2019-10-31

## 2019-11-04 ENCOUNTER — OFFICE VISIT (OUTPATIENT)
Dept: NEUROLOGY | Age: 59
End: 2019-11-04
Payer: COMMERCIAL

## 2019-11-04 VITALS
BODY MASS INDEX: 28.6 KG/M2 | HEART RATE: 88 BPM | DIASTOLIC BLOOD PRESSURE: 70 MMHG | HEIGHT: 63 IN | SYSTOLIC BLOOD PRESSURE: 112 MMHG | WEIGHT: 161.4 LBS

## 2019-11-04 DIAGNOSIS — G35 MULTIPLE SCLEROSIS (HCC): Primary | ICD-10-CM

## 2019-11-04 PROCEDURE — 99213 OFFICE O/P EST LOW 20 MIN: CPT | Performed by: PSYCHIATRY & NEUROLOGY

## 2019-11-04 RX ORDER — GABAPENTIN 300 MG/1
300 CAPSULE ORAL DAILY
COMMUNITY
Start: 2019-10-29 | End: 2020-05-27 | Stop reason: SDUPTHER

## 2019-11-14 ENCOUNTER — HOSPITAL ENCOUNTER (OUTPATIENT)
Dept: MRI IMAGING | Age: 59
Discharge: HOME | End: 2019-11-14
Payer: COMMERCIAL

## 2019-11-14 DIAGNOSIS — G35 MULTIPLE SCLEROSIS (HCC): ICD-10-CM

## 2019-11-14 PROCEDURE — 6360000004 HC RX CONTRAST MEDICATION: Performed by: PSYCHIATRY & NEUROLOGY

## 2019-11-14 PROCEDURE — 70553 MRI BRAIN STEM W/O & W/DYE: CPT

## 2019-11-14 PROCEDURE — A9579 GAD-BASE MR CONTRAST NOS,1ML: HCPCS | Performed by: PSYCHIATRY & NEUROLOGY

## 2019-11-14 RX ADMIN — GADOTERIDOL 14 ML: 279.3 INJECTION, SOLUTION INTRAVENOUS at 13:25

## 2020-01-03 ENCOUNTER — HOSPITAL ENCOUNTER (EMERGENCY)
Age: 60
Discharge: HOME | End: 2020-01-03
Attending: EMERGENCY MEDICINE
Payer: COMMERCIAL

## 2020-01-03 VITALS
DIASTOLIC BLOOD PRESSURE: 74 MMHG | OXYGEN SATURATION: 96 % | TEMPERATURE: 96.4 F | SYSTOLIC BLOOD PRESSURE: 159 MMHG | RESPIRATION RATE: 16 BRPM | HEART RATE: 98 BPM

## 2020-01-03 LAB
AMORPHOUS: ABNORMAL
BACTERIA: ABNORMAL
BILIRUBIN URINE: NEGATIVE
BLOOD, URINE: ABNORMAL
CASTS UA: ABNORMAL /LPF
CHARACTER, URINE: ABNORMAL
COLOR: YELLOW
CRYSTALS, UA: ABNORMAL
EPITHELIAL CELLS, UA: ABNORMAL /HPF
GLUCOSE, URINE: NEGATIVE MG/DL
KETONES, URINE: 15
LEUKOCYTE ESTERASE, URINE: ABNORMAL
MUCUS: ABNORMAL
NITRITE, URINE: NEGATIVE
PH UA: 6.5 (ref 5–9)
PROTEIN UA: >= 300 MG/DL
RBC UA: ABNORMAL /HPF
REFLEX TO URINE C & S: ABNORMAL
SPECIFIC GRAVITY UA: 1.02 (ref 1–1.03)
UROBILINOGEN, URINE: 0.2 EU/DL (ref 0–1)
WBC UA: ABNORMAL /HPF

## 2020-01-03 PROCEDURE — 87186 SC STD MICRODIL/AGAR DIL: CPT

## 2020-01-03 PROCEDURE — 6370000000 HC RX 637 (ALT 250 FOR IP): Performed by: EMERGENCY MEDICINE

## 2020-01-03 PROCEDURE — 81001 URINALYSIS AUTO W/SCOPE: CPT

## 2020-01-03 PROCEDURE — 87077 CULTURE AEROBIC IDENTIFY: CPT

## 2020-01-03 PROCEDURE — 87086 URINE CULTURE/COLONY COUNT: CPT

## 2020-01-03 PROCEDURE — 99283 EMERGENCY DEPT VISIT LOW MDM: CPT

## 2020-01-03 RX ORDER — CIPROFLOXACIN 500 MG/1
500 TABLET, FILM COATED ORAL ONCE
Status: COMPLETED | OUTPATIENT
Start: 2020-01-03 | End: 2020-01-03

## 2020-01-03 RX ORDER — CIPROFLOXACIN 250 MG/1
250 TABLET, FILM COATED ORAL 2 TIMES DAILY
Qty: 14 TABLET | Refills: 0 | Status: SHIPPED | OUTPATIENT
Start: 2020-01-03 | End: 2020-01-10

## 2020-01-03 RX ORDER — DALFAMPRIDINE 10 MG/1
10 TABLET, FILM COATED, EXTENDED RELEASE ORAL EVERY 12 HOURS
Qty: 60 TABLET | Refills: 3 | Status: SHIPPED | OUTPATIENT
Start: 2020-01-03 | End: 2020-04-22

## 2020-01-03 RX ORDER — DIMETHYL FUMARATE 240 MG/1
CAPSULE ORAL
Qty: 180 CAPSULE | Refills: 0 | Status: SHIPPED | OUTPATIENT
Start: 2020-01-03 | End: 2020-03-06

## 2020-01-03 RX ADMIN — CIPROFLOXACIN 500 MG: 500 TABLET, FILM COATED ORAL at 15:50

## 2020-01-03 ASSESSMENT — ENCOUNTER SYMPTOMS
WHEEZING: 0
SORE THROAT: 0
ABDOMINAL PAIN: 0
DIARRHEA: 0
BACK PAIN: 0
SHORTNESS OF BREATH: 0
VOMITING: 0
BLOOD IN STOOL: 0

## 2020-01-03 ASSESSMENT — PAIN DESCRIPTION - PAIN TYPE: TYPE: ACUTE PAIN

## 2020-01-03 ASSESSMENT — PAIN DESCRIPTION - LOCATION: LOCATION: PELVIS

## 2020-01-03 ASSESSMENT — PAIN DESCRIPTION - DESCRIPTORS: DESCRIPTORS: BURNING

## 2020-01-03 ASSESSMENT — PAIN SCALES - GENERAL: PAINLEVEL_OUTOF10: 5

## 2020-01-03 NOTE — ED PROVIDER NOTES
alert.   Nursing note and vitals reviewed. DIAGNOSTIC RESULTS       LABS:   Labs Reviewed   URINE RT REFLEX TO CULTURE - Abnormal; Notable for the following components:       Result Value    Blood, Urine LARGE (*)     Leukocyte Esterase, Urine TRACE (*)     Character, Urine TURBID (*)     All other components within normal limits   URINE CULTURE    Narrative:     Source: urine, clean catch       Site:           Current Antibiotics: none   URINE CULTURE   Urinalysis showed  mild UTI    EMERGENCY DEPARTMENT COURSE:   Vitals:    Vitals:    01/03/20 1453   BP: (!) 159/74   Pulse: 98   Resp: 16   Temp: 96.4 °F (35.8 °C)   SpO2: 96%     Patient received Cipro 500 mg p.o.    BP (!) 159/74   Pulse 98   Temp 96.4 °F (35.8 °C)   Resp 16   SpO2 96%   Your blood pressure was elevated today which is not uncommon in the emergency department. Please have this rechecked by your primary care provider to assure that you are not prehypertensive or hypertensive. FINAL IMPRESSION      1. Acute cystitis without hematuria          DISPOSITION/PLAN   She was discharged home in good condition, she was given discharge instructions and was advised to return if worse or new symptoms.     PATIENT REFERRED TO:  [address], [address]  [address] [place] [address]  [phone]    In 2 days        DISCHARGE MEDICATIONS:  New Prescriptions    CIPROFLOXACIN (CIPRO) 250 MG TABLET    Take 1 tablet by mouth 2 times daily for 7 days    DALFAMPRIDINE (AMPYRA) 10 MG EXTENDED RELEASE TABLET    Take 1 tablet by mouth every 12 hours       (Please note that portions of this note were completed with a voice recognition program.  Efforts were made to edit the dictations but occasionally words are mis-transcribed.)    MD Stephanie De León MD  01/03/20 Jaylen Umanzor MD  01/03/20 [id number]

## 2020-01-03 NOTE — ED NOTES
Bedside report given to CATARINO [facility] RUBINN.      Chandan Franco RN  01/03/20 4614

## 2020-01-03 NOTE — ED NOTES
Dark, veronica cloudy urine collected per clean catch and sent to lab.      Lauro Cho LPN  97/84/31 0915

## 2020-01-05 LAB
ORGANISM: ABNORMAL
URINE CULTURE, ROUTINE: ABNORMAL

## 2020-01-06 ENCOUNTER — TELEPHONE (OUTPATIENT)
Dept: FAMILY MEDICINE CLINIC | Age: 60
End: 2020-01-06

## 2020-01-23 RX ORDER — AMANTADINE HYDROCHLORIDE 100 MG/1
100 CAPSULE, GELATIN COATED ORAL 2 TIMES DAILY
Qty: 180 CAPSULE | Refills: 2 | Status: SHIPPED | OUTPATIENT
Start: 2020-01-23 | End: 2020-01-30 | Stop reason: SDUPTHER

## 2020-01-23 RX ORDER — BACLOFEN 10 MG/1
TABLET ORAL
Qty: 270 TABLET | Refills: 2 | Status: SHIPPED | OUTPATIENT
Start: 2020-01-23 | End: 2020-01-30 | Stop reason: SDUPTHER

## 2020-01-30 RX ORDER — BACLOFEN 10 MG/1
TABLET ORAL
Qty: 270 TABLET | Refills: 2 | Status: SHIPPED | OUTPATIENT
Start: 2020-01-30 | End: 2021-02-08

## 2020-01-30 RX ORDER — AMANTADINE HYDROCHLORIDE 100 MG/1
100 CAPSULE, GELATIN COATED ORAL 2 TIMES DAILY
Qty: 180 CAPSULE | Refills: 2 | Status: SHIPPED | OUTPATIENT
Start: 2020-01-30 | End: 2021-02-19 | Stop reason: SDUPTHER

## 2020-03-05 ENCOUNTER — NURSE ONLY (OUTPATIENT)
Dept: LAB | Age: 60
End: 2020-03-05

## 2020-03-06 LAB
ALBUMIN SERPL-MCNC: 4.5 G/DL (ref 3.5–5.1)
ALP BLD-CCNC: 77 U/L (ref 38–126)
ALT SERPL-CCNC: 13 U/L (ref 11–66)
AST SERPL-CCNC: 17 U/L (ref 5–40)
BASOPHILS # BLD: 0.6 %
BASOPHILS ABSOLUTE: 0 THOU/MM3 (ref 0–0.1)
BILIRUB SERPL-MCNC: 0.3 MG/DL (ref 0.3–1.2)
BILIRUBIN DIRECT: < 0.2 MG/DL (ref 0–0.3)
EOSINOPHIL # BLD: 4.1 %
EOSINOPHILS ABSOLUTE: 0.2 THOU/MM3 (ref 0–0.4)
ERYTHROCYTE [DISTWIDTH] IN BLOOD BY AUTOMATED COUNT: 12.9 % (ref 11.5–14.5)
ERYTHROCYTE [DISTWIDTH] IN BLOOD BY AUTOMATED COUNT: 46.5 FL (ref 35–45)
HCT VFR BLD CALC: 41.9 % (ref 37–47)
HEMOGLOBIN: 13.1 GM/DL (ref 12–16)
IMMATURE GRANS (ABS): 0 THOU/MM3 (ref 0–0.07)
IMMATURE GRANULOCYTES: 0 %
LYMPHOCYTES # BLD: 35.4 %
LYMPHOCYTES ABSOLUTE: 1.7 THOU/MM3 (ref 1–4.8)
MCH RBC QN AUTO: 30.8 PG (ref 26–33)
MCHC RBC AUTO-ENTMCNC: 31.3 GM/DL (ref 32.2–35.5)
MCV RBC AUTO: 98.6 FL (ref 81–99)
MONOCYTES # BLD: 9.5 %
MONOCYTES ABSOLUTE: 0.5 THOU/MM3 (ref 0.4–1.3)
NUCLEATED RED BLOOD CELLS: 0 /100 WBC
PLATELET # BLD: 255 THOU/MM3 (ref 130–400)
PMV BLD AUTO: 10.2 FL (ref 9.4–12.4)
RBC # BLD: 4.25 MILL/MM3 (ref 4.2–5.4)
SEG NEUTROPHILS: 50.4 %
SEGMENTED NEUTROPHILS ABSOLUTE COUNT: 2.5 THOU/MM3 (ref 1.8–7.7)
TOTAL PROTEIN: 7 G/DL (ref 6.1–8)
WBC # BLD: 4.9 THOU/MM3 (ref 4.8–10.8)

## 2020-03-06 RX ORDER — DIMETHYL FUMARATE 240 MG/1
CAPSULE ORAL
Qty: 180 CAPSULE | Refills: 1 | Status: SHIPPED | OUTPATIENT
Start: 2020-03-06 | End: 2020-05-26

## 2020-04-22 ENCOUNTER — VIRTUAL VISIT (OUTPATIENT)
Dept: NEUROLOGY | Age: 60
End: 2020-04-22
Payer: COMMERCIAL

## 2020-04-22 PROCEDURE — 99213 OFFICE O/P EST LOW 20 MIN: CPT | Performed by: PSYCHIATRY & NEUROLOGY

## 2020-04-22 NOTE — PROGRESS NOTES
person/place/time [x]Able to follow commands      Eyes:  EOM    [x]  Normal  [] Abnormal-  Sclera  [x]  Normal  [] Abnormal -         Discharge [x]  None visible  [] Abnormal -    HENT:   [x] Normocephalic, atraumatic. [] Abnormal   [x] Mouth/Throat: Mucous membranes are moist.     External Ears [x] Normal  [] Abnormal-     Neck: [x] No visualized mass     Pulmonary/Chest: [x] Respiratory effort normal.  [x] No visualized signs of difficulty breathing or respiratory distress        [] Abnormal-      Musculoskeletal:   [] Normal gait with no signs of ataxia         [x] Normal range of motion of neck        [] Abnormal-       Neurological:        [x] No Facial Asymmetry (Cranial nerve 7 motor function) (limited exam to video visit)          [x] No gaze palsy        [] Abnormal-         Skin:        [x] No significant exanthematous lesions or discoloration noted on facial skin         [] Abnormal-            Psychiatric:       [x] Normal Affect [x] No Hallucinations        [] Abnormal-     Other pertinent observable physical exam findings-   MRI Brain 11/2019:  I reviewed the MRI  brain and agree with interpretation. FINDINGS:       Current lesion burden of white matter: Very mild   Interval since the most recent exam: 12 months. Interval new lesion development: None. Number of pathologically enhancing lesions of the white matter: None.       The diffusion-weighted images are normal. The brain volume is normal.There are no intra-or extra-axial collections. Edda Clock is no hydrocephalus, midline shift or mass effect.           On the FLAIR and T2-weighted sequences, there is some faint signal hyperintensity in the dorsal juan carlos and around the fourth ventricle. There is minimal abnormal signal scattered in the white matter of the brain. No new white matter lesions are present.       On the gradient echo T2-weighted images, there is mineralization in the medial aspects of the basal ganglia.  No other areas of homes.    --Val Barrientos MD on 4/22/2020 at 1:46 PM    An electronic signature was used to authenticate this note.

## 2020-04-22 NOTE — PATIENT INSTRUCTIONS
1. Continue Tecfidera 240 mg twice a day  2. CBC hepatic panel 10/2020  3. Take calcium and vitamin D supplements  4. Stay active  5. Ampyra discontinued due to side effects  6. Report any new symptoms  7.  Follow-up 11/2020

## 2020-05-26 RX ORDER — DIMETHYL FUMARATE 240 MG/1
CAPSULE ORAL
Qty: 60 CAPSULE | Refills: 3 | Status: SHIPPED | OUTPATIENT
Start: 2020-05-26 | End: 2020-09-14

## 2020-05-27 RX ORDER — GABAPENTIN 300 MG/1
300 CAPSULE ORAL DAILY
Qty: 30 CAPSULE | Refills: 3 | Status: SHIPPED | OUTPATIENT
Start: 2020-05-27 | End: 2020-09-24 | Stop reason: SDUPTHER

## 2020-09-08 NOTE — TELEPHONE ENCOUNTER
Left voice message for patient to get labs drawn prior to refill. Sent mychart message to inform of above.

## 2020-09-12 LAB
ABSOLUTE BASO #: 0 X10E9/L (ref 0–0.2)
ABSOLUTE EOS #: 0.3 X10E9/L (ref 0–0.4)
ABSOLUTE LYMPH #: 2.1 X10E9/L (ref 1–3.5)
ABSOLUTE MONO #: 0.5 X10E9/L (ref 0–0.9)
ABSOLUTE NEUT #: 3.5 X10E9/L (ref 1.5–6.6)
ALBUMIN SERPL-MCNC: 4.6 G/DL (ref 3.2–5.3)
ALK PHOSPHATASE: 75 U/L (ref 39–130)
ALT SERPL-CCNC: 14 U/L (ref 0–31)
AST SERPL-CCNC: 15 U/L (ref 0–41)
BASOPHILS RELATIVE PERCENT: 0.7 %
BILIRUB SERPL-MCNC: 0.6 MG/DL (ref 0.3–1.2)
BILIRUBIN DIRECT: 0.1 MG/DL (ref 0–0.4)
EOSINOPHILS RELATIVE PERCENT: 4.8 %
HCT VFR BLD CALC: 40.5 % (ref 35–47)
HEMOGLOBIN: 13.5 G/DL (ref 11.7–15.5)
LYMPHOCYTE %: 32.8 %
MCH RBC QN AUTO: 30.7 PG (ref 27–34)
MCHC RBC AUTO-ENTMCNC: 33.3 G/DL (ref 32–36)
MCV RBC AUTO: 92 FL (ref 80–100)
MONOCYTES # BLD: 8.4 %
NEUTROPHILS RELATIVE PERCENT: 53.3 %
PDW BLD-RTO: 13.4 % (ref 11.5–15)
PLATELETS: 241 X10E9/L (ref 150–450)
PMV BLD AUTO: 7.9 FL (ref 7–12)
RBC: 4.39 X10E12/L (ref 3.8–5.2)
TOTAL PROTEIN: 7.3 G/DL (ref 6–8)
WBC: 6.6 X10E9/L (ref 4–11)

## 2020-09-14 RX ORDER — DIMETHYL FUMARATE 240 MG/1
CAPSULE ORAL
Qty: 60 CAPSULE | Refills: 5 | Status: SHIPPED | OUTPATIENT
Start: 2020-09-14 | End: 2021-02-19 | Stop reason: SDUPTHER

## 2020-09-15 ENCOUNTER — HOSPITAL ENCOUNTER (OUTPATIENT)
Dept: MAMMOGRAPHY | Age: 60
Discharge: HOME | End: 2020-09-15
Payer: COMMERCIAL

## 2020-09-15 PROCEDURE — 77063 BREAST TOMOSYNTHESIS BI: CPT

## 2020-09-24 RX ORDER — GABAPENTIN 300 MG/1
300 CAPSULE ORAL DAILY
Qty: 30 CAPSULE | Refills: 3 | Status: SHIPPED | OUTPATIENT
Start: 2020-09-24 | End: 2021-04-13 | Stop reason: SDUPTHER

## 2020-11-15 ENCOUNTER — HOSPITAL ENCOUNTER (EMERGENCY)
Age: 60
Discharge: HOME | End: 2020-11-15
Attending: EMERGENCY MEDICINE
Payer: COMMERCIAL

## 2020-11-15 ENCOUNTER — APPOINTMENT (OUTPATIENT)
Dept: GENERAL RADIOLOGY | Age: 60
End: 2020-11-15
Payer: COMMERCIAL

## 2020-11-15 VITALS
BODY MASS INDEX: 26.46 KG/M2 | SYSTOLIC BLOOD PRESSURE: 142 MMHG | WEIGHT: 155 LBS | HEIGHT: 64 IN | DIASTOLIC BLOOD PRESSURE: 85 MMHG | OXYGEN SATURATION: 98 % | HEART RATE: 101 BPM | RESPIRATION RATE: 14 BRPM | TEMPERATURE: 98.2 F

## 2020-11-15 PROCEDURE — 99283 EMERGENCY DEPT VISIT LOW MDM: CPT

## 2020-11-15 PROCEDURE — 2709999900 HC NON-CHARGEABLE SUPPLY

## 2020-11-15 PROCEDURE — 73660 X-RAY EXAM OF TOE(S): CPT

## 2020-11-15 ASSESSMENT — PAIN DESCRIPTION - LOCATION
LOCATION: TOE (COMMENT WHICH ONE)
LOCATION: TOE (COMMENT WHICH ONE)

## 2020-11-15 ASSESSMENT — PAIN DESCRIPTION - ORIENTATION
ORIENTATION: LEFT
ORIENTATION: LEFT

## 2020-11-15 ASSESSMENT — PAIN SCALES - GENERAL
PAINLEVEL_OUTOF10: 8
PAINLEVEL_OUTOF10: 4

## 2020-11-15 NOTE — ED PROVIDER NOTES
[address]  [address]  [address]  Phone: [phone]    Emergency Department encounter      [address]    Chief Complaint   Patient presents with    Foot Injury     2nd toe left foot       HPI    Farideh Lester is a 61 y.o. female who presents above-noted complaint. Patient was doing fine. She had a small canned good fall onto her left second toe. She complains of pain and discomfort to that area. Denies other injury weakness numbness tingling or other problems. She is otherwise been doing pretty well    PAST MEDICAL HISTORY    Past Medical History:   Diagnosis Date    Arthritis     neck and right shoulder, pain clinic and [facility] needle [facility]    Double vision     with MS    Multiple sclerosis ([facility] [address]        SURGICAL HISTORY    Past Surgical History:   Procedure Laterality Date     SECTION      times 4    FOOT SURGERY Right 2014    tendon repair, Kristene Riedel    HYSTERECTOMY, TOTAL ABDOMINAL  ? ?    1 ovary remains, large fibroid and DUB but no cancer    LAPAROSCOPY Left     left knee \"cleaned out\"       CURRENT MEDICATIONS    Current Outpatient Rx   Medication Sig Dispense Refill    Ascorbic Acid (VITAMIN C ER PO) Take by mouth      gabapentin (NEURONTIN) 300 MG capsule Take 1 capsule by mouth daily for 123 days. 30 capsule 3    TECFIDERA 240 MG delayed release capsule TAKE 1 CAPSULE (240MG) BY  MOUTH TWICE DAILY 60 capsule 5    amantadine (SYMMETREL) 100 MG capsule Take 1 capsule by mouth 2 times daily 180 capsule 2    baclofen (LIORESAL) 10 MG tablet TAKE 1 TABLET THREE TIMES A  tablet 2    cyclobenzaprine (FLEXERIL) 10 MG tablet       HYDROcodone-acetaminophen (NORCO) 5-325 MG per tablet Take 2 tablets by mouth nightly.  Indications: 1/2 tablet nightly 1-2 tabs 5-325      acetaminophen (TYLENOL) 500 MG tablet Take 1,000 mg by mouth every 4 hours as needed       therapeutic multivitamin-minerals (THERAGRAN-M) tablet None       REVIEW OF SYSTEMS    Positive for toe pain without laceration or bleeding. All systems negative except as marked. PHYSICAL EXAM    VITAL SIGNS: BP (!) 142/85   Pulse 101   Temp 98.2 °F (36.8 °C) (Temporal)   Resp 14   Ht 5' 3.5\" (1.613 m)   Wt 155 lb (70.3 kg)   SpO2 98%   BMI 27.03 kg/m²    Constitutional:  Alert not toxtic or ill, able to give coherent history  HENT:  Normocephalic, Atraumatic  Cervical Spine: Normal range of motion,  No stridor. Eyes:  No discharge or  Swelling  Respiratory: No respiratory distress  Musculoskeletal:  Intact distal pulses, some erythema to the second toe at the DIP. No bleeding or laceration or other injury or trauma. Integument:  Warm, Dry, No erythema, No rash (on exposed areas)   Neurologic:  Alert & appropriate   Psychiatric:  Affect normal    EKG                      RADIOLOGY    XR TOE LEFT (MIN 2 VIEWS)   Final Result   Soft tissue swelling of the second toe. No fracture. No foreign body. **This report has been created using voice recognition software. It may contain minor errors which are inherent in voice recognition technology. **      Final report electronically signed by Dr. Kimani Narvaez on 11/15/2020 10:38 AM          PROCEDURES    none    CONSULTS:  None        ED COURSE & MEDICAL DECISION MAKING    Pertinent Labs & Imaging studies reviewed. (See chart for details)  Patient presents with toe injury and pain checking plain films. No other neurovascular compromise. REASSESSMENT  Patient rechecked and updated on lab/xray status, progress and results. .  Patient was reassessed and condition was unchanged after tx. No further needs at this time. X-rays are negative. Clinical exam is benign. Treat with supportive care melani tape for contusion.         SCREENINGS  BP (!) 142/85   Pulse 101   Temp 98.2 °F (36.8 °C) (Temporal)   Resp 14   Ht 5' 3.5\" (1.613 m)   Wt 155 lb (70.3 kg)   SpO2 98%   BMI 27.03 kg/m² XR TOE LEFT (MIN 2 VIEWS)   Final Result   Soft tissue swelling of the second toe. No fracture. No foreign body. **This report has been created using voice recognition software. It may contain minor errors which are inherent in voice recognition technology. **      Final report electronically signed by Dr. Karine Ocasio on 11/15/2020 10:38 AM          Screening For Hypertension and Follow-up (#317)  patient informed that blood pressure is abnormal and in the pre-hypertension range and should be rechecked by primary care      Screening For Tobacco Use and Cessation Intervention (#226):   reports that she has never smoked. She has never used smokeless tobacco.  Non-smoker not applicable for screen      FINAL IMPRESSION    1.  Contusion of toe of left foot, unspecified toe, initial encounter         PATIENT REFERRED TO:  [address] , [address]  [address] [place] [address]  [phone]    Call   For evaluation      DISCHARGE MEDICATIONS:  New Prescriptions    No medications on file           So Gatica MD  11/15/20 7921

## 2020-11-16 ENCOUNTER — TELEPHONE (OUTPATIENT)
Dept: FAMILY MEDICINE CLINIC | Age: 60
End: 2020-11-16

## 2020-12-09 ENCOUNTER — TELEPHONE (OUTPATIENT)
Dept: FAMILY MEDICINE CLINIC | Age: 60
End: 2020-12-09

## 2020-12-09 NOTE — TELEPHONE ENCOUNTER
Data: female baby born at 0837   Delivery remarkable for Vacuum extraction, NICU present at delivery- see delivery summary for details.  Action: Interventions at birth were drying,  and warm blankets. Infant placed skin-to-skin with mother.  Response: Stable . Positive bonding behaviors observed.  Shanice Adair RN on 2019 at 9:03 AM       E[facility] received a call from:    Name of Caller: Talon Stearns    Relationship to patient: Self    Best contact number: [phone]  Pt ok'd VM if needed. Reason for call: pt had physical therapy yesterday at [address] Lady pt worked with yesterday suggested pt have an evaluation for Vertigo based on signs/symptoms pt shared with her. Issue has been ongoing for a little while, pt states it's mostly at night.     Please call Talon Stearns regards of the evaluation/referral.

## 2021-02-08 DIAGNOSIS — G35 MULTIPLE SCLEROSIS (HCC): Primary | ICD-10-CM

## 2021-02-08 RX ORDER — BACLOFEN 10 MG/1
TABLET ORAL
Qty: 270 TABLET | Refills: 2 | Status: SHIPPED | OUTPATIENT
Start: 2021-02-08 | End: 2021-11-15 | Stop reason: SDUPTHER

## 2021-02-19 ENCOUNTER — OFFICE VISIT (OUTPATIENT)
Dept: NEUROLOGY | Age: 61
End: 2021-02-19
Payer: COMMERCIAL

## 2021-02-19 VITALS
BODY MASS INDEX: 28.53 KG/M2 | SYSTOLIC BLOOD PRESSURE: 132 MMHG | WEIGHT: 161 LBS | HEIGHT: 63 IN | HEART RATE: 100 BPM | DIASTOLIC BLOOD PRESSURE: 82 MMHG

## 2021-02-19 DIAGNOSIS — G35 MULTIPLE SCLEROSIS (HCC): Primary | ICD-10-CM

## 2021-02-19 PROCEDURE — 99213 OFFICE O/P EST LOW 20 MIN: CPT | Performed by: NURSE PRACTITIONER

## 2021-02-19 RX ORDER — AMANTADINE HYDROCHLORIDE 100 MG/1
100 CAPSULE, GELATIN COATED ORAL 2 TIMES DAILY
Qty: 180 CAPSULE | Refills: 2 | Status: SHIPPED | OUTPATIENT
Start: 2021-02-19 | End: 2021-11-15 | Stop reason: SDUPTHER

## 2021-02-19 RX ORDER — DIMETHYL FUMARATE 240 MG/1
CAPSULE ORAL
Qty: 180 CAPSULE | Refills: 1 | Status: SHIPPED | OUTPATIENT
Start: 2021-02-19 | End: 2021-03-26

## 2021-02-19 NOTE — PATIENT INSTRUCTIONS
1. Continue Tecfidera 240 mg twice a day, refills given. 2. CBC hepatic panel 3/2021  3. Take calcium and vitamin D supplements  4. Stay physically active  5. Report any new symptoms  6. Follow up in 6 months or sooner if needed. 7. Call if any questions or concerns.

## 2021-02-19 NOTE — PROGRESS NOTES
NEUROLOGY OUT PATIENT FOLLOW UP NOTE:  2/19/20211:50 PM    Aruna Vogt is here for follow up for multiple sclerosis. She is doing very well. She denies any new numbness or weakness. Her balance is good, no falls. She reports she did have some intermittent dizziness and nystagmus that improved after evaluation with vestibular therapy. Her dizziness only occurred when she would lay down in bed and rolling over in bed. She has not experienced any more dizziness in the past 1 month. She is on Tecfidera and Amantadine and is tolerating the medication well. She comes in today with her  to discuss the plan of care going forward. ROS:  Respiratory : no cough, no shortness of breath  Cardiac: no chest pain. No palpitations. Renal : no flank pain, no hematuria    Skin: no rash      No Known Allergies    Current Outpatient Medications:     zinc 50 MG CAPS, Take 1 capsule by mouth daily, Disp: , Rfl:     baclofen (LIORESAL) 10 MG tablet, TAKE ONE TABLET THREE TIMES A DAY, BY MOUTH., Disp: 270 tablet, Rfl: 2    Ascorbic Acid (VITAMIN C ER PO), Take by mouth, Disp: , Rfl:     TECFIDERA 240 MG delayed release capsule, TAKE 1 CAPSULE (240MG) BY  MOUTH TWICE DAILY, Disp: 60 capsule, Rfl: 5    Handicap Placard [facility], Exp. 2025, Disp: 1 each, Rfl: 0    amantadine (SYMMETREL) 100 MG capsule, Take 1 capsule by mouth 2 times daily, Disp: 180 capsule, Rfl: 2    cyclobenzaprine (FLEXERIL) 10 MG tablet, , Disp: , Rfl:     HYDROcodone-acetaminophen (NORCO) 5-325 MG per tablet, Take 2 tablets by mouth nightly. Indications: 1/2 tablet nightly 1-2 tabs 5-325, Disp: , Rfl:     acetaminophen (TYLENOL) 500 MG tablet, Take 1,000 mg by mouth every 4 hours as needed , Disp: , Rfl:     therapeutic multivitamin-minerals (THERAGRAN-M) tablet, Take 1 tablet by mouth daily. , Disp: , Rfl:     Cholecalciferol (VITAMIN D-3 PO), Take  by mouth 2 times daily. , Disp: , Rfl:     calcium carbonate-vitamin D (CALCIUM + D) 600-200 MG-UNIT TABS, Take  by mouth 2 times daily. , Disp: , Rfl:     gabapentin (NEURONTIN) 300 MG capsule, Take 1 capsule by mouth daily for 123 days. , Disp: 30 capsule, Rfl: 3    I reviewed the past medical history, allergies, medications, social history and family history. PE:   Vitals:    02/19/21 1341   BP: 132/82   Site: Left Upper Arm   Position: Sitting   Cuff Size: Large Adult   Pulse: 100   Weight: 161 lb (73 kg)   Height: 5' 3\" (1.6 m)     General Appearance:  awake, alert, oriented, in no acute distress  Gen: NAD, Language is Intact. Skin: no rash, lesion, dry  to touch. warm  Head: no rash, no icterus  Neck: There is no carotid bruits. The Neck is supple. There is no neck lymphadenopathy. Neuro: CN 2-12 grossly intact with no focal deficits. Power 5/5 in right upper and right lower extremity. Mild left hemiparesis 4/5. Reflexes are  symmetric. Long tracts are intact. Cerebellar exam is Intact. Sensory exam is intact to light touch. Gait is limping  Musculoskeletal:  Has no hand arthritis, no limitation of ROM in any of the four extremities. Lower extremities no edema  The abdomen is soft,  intact bowel sounds.          DATA:  Results for orders placed or performed in visit on 09/11/20   Hepatic Function Panel   Result Value Ref Range    Total Bilirubin 0.6 0.3 - 1.2 mg/dL    Bilirubin, Direct 0.1 0.0 - 0.4 mg/dL    Alk Phosphatase 75 39 - 130 U/L    AST 15 0 - 41 U/L    ALT 14 0 - 31 U/L    Total Protein 7.3 6.0 - 8.0 g/dL    Albumin 4.6 3.2 - 5.3 g/dL   CBC   Result Value Ref Range    WBC 6.6 4.0 - 11.0 X10E9/L    RBC 4.39 3.80 - 5.20 X10E12/L    Hemoglobin 13.5 11.7 - 15.5 g/dL    Hematocrit 40.5 35 - 47 %    MCV 92 80 - 100 fL    MCH 30.7 27 - 34 pg    MCHC 33.3 32 - 36 g/dL    RDW 13.4 11.5 - 15.0 %    Platelets 594 801 - 114 X10E9/L    MPV 7.9 7 - 12 fL    Neutrophils % 53.3 %    Absolute Neut # 3.5 1.5 - 6.6 X10E9/L    Lymphocyte % 32.8 %    Absolute Lymph # 2.1 1.0 - 3.5 X10E9/L    Monocytes 8.4 % Absolute Mono # 0.5 0 - 0.9 X10E9/L    Eosinophils % 4.8 %    Absolute Eos # 0.3 0.0 - 0.4 X10E9/L    Basophils % 0.7 %    Absolute Baso # 0.0 0.0 - 0.2 X10E9/L          Results for orders placed during the hospital encounter of 11/14/19   MRI BRAIN W WO CONTRAST    Narrative PROCEDURE: MRI BRAIN W WO CONTRAST    CLINICAL INFORMATIONMultiple sclerosis ([facility] [address] Multiple sclerosis diagnosed in 1999. Yearly MRI. No new issues. She does have gait issues. COMPARISON: Brain MRI 12/14/2018. TECHNIQUE: Multiplanar and multiple spin echo T1 and T2-weighted images were obtained through the brain before and after the administration of intravenous contrast. High resolution sagittal FLAIR images were also obtained. FINDINGS:    Current lesion burden of white matter: Very mild  Interval since the most recent exam: 12 months. Interval new lesion development: None. Number of pathologically enhancing lesions of the white matter: None. The diffusion-weighted images are normal. The brain volume is normal.There are no intra-or extra-axial collections. There is no hydrocephalus, midline shift or mass effect. On the FLAIR and T2-weighted sequences, there is some faint signal hyperintensity in the dorsal juan carlos and around the fourth ventricle. There is minimal abnormal signal scattered in the white matter of the brain. No new white matter lesions are present. On the gradient echo T2-weighted images, there is mineralization in the medial aspects of the basal ganglia. No other areas of susceptibility artifact are present. There is no abnormal enhancement in the brain. The major intracranial vascular flow voids are present. The midline craniocervical junction structures are normal.  The brainstem and pituitary gland are normal.            Impression    1. Minimal amount of abnormal signal in the white matter the brain. This is stable. 2. No new signal. No evidence of active demyelination.           **This report has been created using voice recognition software. It may contain minor errors which are inherent in voice recognition technology. **      Final report electronically signed by Dr. Alexander So on 11/14/2019 2:21 PM            Assessment:     Diagnosis Orders   1. Multiple sclerosis (HCC)  TECFIDERA 240 MG delayed release capsule    amantadine (SYMMETREL) 100 MG capsule        She is doing very well. She denies any new numbness or weakness. Her balance is good, no falls. She reports she did have some intermittent dizziness and nystagmus that improved after evaluation with vestibular therapy. Her dizziness only occurred when she would lay down in bed and rolling over in bed. She has not experienced any more dizziness in the past 1 month. She is on Tecfidera and Amantadine and is tolerating the medication well. I suggested the patient undergo MRI brain W/WO contrast as her last scan was 11/2019, but she is wishing to hold off at this time. She was instructed to notify us if her dizziness reoccurs or if she develops any other symptoms. After detailed discussion with patient and her  we agreed on the following plan. Plan:  1. Continue Tecfidera 240 mg twice a day, refills given. 2. CBC hepatic panel 3/2021  3. Take calcium and vitamin D supplements  4. Stay physically active  5. Report any new symptoms  6. Follow up in 6 months or sooner if needed. 7. Call if any questions or concerns. Time spent evaluating patient, reviewing records, counseling with more than 50% of the time spent for counseling was 23 min.     Sandor Matson CNP

## 2021-03-16 ENCOUNTER — TELEPHONE (OUTPATIENT)
Dept: NEUROLOGY | Age: 61
End: 2021-03-16

## 2021-03-16 NOTE — TELEPHONE ENCOUNTER
Spoke with Dr Sheldon Hays who stated to have patient follow up in office today. Spoke with patient who stated she has to work today and can not come in. Accommodated patient with in office visit on 3/17/21 per patient request. Dr Sheldon Hays notified of above.

## 2021-03-17 ENCOUNTER — OFFICE VISIT (OUTPATIENT)
Dept: NEUROLOGY | Age: 61
End: 2021-03-17
Payer: COMMERCIAL

## 2021-03-17 ENCOUNTER — NURSE ONLY (OUTPATIENT)
Dept: LAB | Age: 61
End: 2021-03-17

## 2021-03-17 VITALS
DIASTOLIC BLOOD PRESSURE: 90 MMHG | HEIGHT: 63 IN | WEIGHT: 160 LBS | HEART RATE: 88 BPM | SYSTOLIC BLOOD PRESSURE: 160 MMHG | BODY MASS INDEX: 28.35 KG/M2

## 2021-03-17 DIAGNOSIS — R26.0 ATAXIC GAIT: ICD-10-CM

## 2021-03-17 DIAGNOSIS — G35 MULTIPLE SCLEROSIS (HCC): Primary | ICD-10-CM

## 2021-03-17 DIAGNOSIS — H53.8 BLURRED VISION: ICD-10-CM

## 2021-03-17 DIAGNOSIS — G35 MULTIPLE SCLEROSIS (HCC): ICD-10-CM

## 2021-03-17 LAB
ALBUMIN SERPL-MCNC: 4.4 G/DL (ref 3.5–5.1)
ALP BLD-CCNC: 92 U/L (ref 38–126)
ALT SERPL-CCNC: 13 U/L (ref 11–66)
AST SERPL-CCNC: 17 U/L (ref 5–40)
BASOPHILS # BLD: 0.6 %
BASOPHILS ABSOLUTE: 0 THOU/MM3 (ref 0–0.1)
BILIRUB SERPL-MCNC: 0.3 MG/DL (ref 0.3–1.2)
BILIRUBIN DIRECT: < 0.2 MG/DL (ref 0–0.3)
EOSINOPHIL # BLD: 5 %
EOSINOPHILS ABSOLUTE: 0.3 THOU/MM3 (ref 0–0.4)
ERYTHROCYTE [DISTWIDTH] IN BLOOD BY AUTOMATED COUNT: 12.5 % (ref 11.5–14.5)
ERYTHROCYTE [DISTWIDTH] IN BLOOD BY AUTOMATED COUNT: 44.2 FL (ref 35–45)
HCT VFR BLD CALC: 42.4 % (ref 37–47)
HEMOGLOBIN: 13.3 GM/DL (ref 12–16)
IMMATURE GRANS (ABS): 0.01 THOU/MM3 (ref 0–0.07)
IMMATURE GRANULOCYTES: 0.2 %
LYMPHOCYTES # BLD: 35.6 %
LYMPHOCYTES ABSOLUTE: 1.8 THOU/MM3 (ref 1–4.8)
MCH RBC QN AUTO: 30.2 PG (ref 26–33)
MCHC RBC AUTO-ENTMCNC: 31.4 GM/DL (ref 32.2–35.5)
MCV RBC AUTO: 96.1 FL (ref 81–99)
MONOCYTES # BLD: 8.2 %
MONOCYTES ABSOLUTE: 0.4 THOU/MM3 (ref 0.4–1.3)
NUCLEATED RED BLOOD CELLS: 0 /100 WBC
PLATELET # BLD: 240 THOU/MM3 (ref 130–400)
PMV BLD AUTO: 9.7 FL (ref 9.4–12.4)
RBC # BLD: 4.41 MILL/MM3 (ref 4.2–5.4)
SEG NEUTROPHILS: 50.4 %
SEGMENTED NEUTROPHILS ABSOLUTE COUNT: 2.5 THOU/MM3 (ref 1.8–7.7)
TOTAL PROTEIN: 7.4 G/DL (ref 6.1–8)
WBC # BLD: 5 THOU/MM3 (ref 4.8–10.8)

## 2021-03-17 PROCEDURE — 99213 OFFICE O/P EST LOW 20 MIN: CPT | Performed by: PSYCHIATRY & NEUROLOGY

## 2021-03-17 NOTE — PATIENT INSTRUCTIONS
1. MRI brain with and without contrast.   2. CBC hepatic panel   3. Hold Tecfidera for now. 4. Take calcium and vitamin D supplements  5. Stay physically active  6. Report any new symptoms  7. Follow up in one week or sooner if needed. 8. Call if any questions or concerns.

## 2021-03-17 NOTE — PROGRESS NOTES
NEUROLOGY OUT PATIENT FOLLOW UP NOTE:  3/17/188070:59 AM    Surjit Denise is here for follow up for multiple sclerosis. The patient contacted us with complaints of feeling off balance, blurred vision, and noted nystagmus worse on left lateral gaze. She denies any falls. She has been taking the Tecfidera consistently. She denies any new numbness, no dysphagia. Denies right-sided symptoms. She was advised to come into the office for evaluation. Unfortunately the patient has not performed her blood work that was given to her on last visit. She comes in with her  to go over plan of care going forward. She is taking amantadine in addition to Tecfidera currently. ROS:  Respiratory : no cough, no shortness of breath  Cardiac: no chest pain. No palpitations. Renal : no flank pain, no hematuria    Skin: no rash      No Known Allergies    Current Outpatient Medications:     zinc 50 MG CAPS, Take 1 capsule by mouth daily, Disp: , Rfl:     TECFIDERA 240 MG delayed release capsule, TAKE 1 CAPSULE (240MG) BY  MOUTH TWICE DAILY, Disp: 180 capsule, Rfl: 1    amantadine (SYMMETREL) 100 MG capsule, Take 1 capsule by mouth 2 times daily, Disp: 180 capsule, Rfl: 2    baclofen (LIORESAL) 10 MG tablet, TAKE ONE TABLET THREE TIMES A DAY, BY MOUTH., Disp: 270 tablet, Rfl: 2    Ascorbic Acid (VITAMIN C ER PO), Take by mouth, Disp: , Rfl:     Handicap Placard [facility], Exp. 2025, Disp: 1 each, Rfl: 0    cyclobenzaprine (FLEXERIL) 10 MG tablet, , Disp: , Rfl:     HYDROcodone-acetaminophen (NORCO) 5-325 MG per tablet, Take 2 tablets by mouth nightly. Indications: 1/2 tablet nightly 1-2 tabs 5-325, Disp: , Rfl:     acetaminophen (TYLENOL) 500 MG tablet, Take 1,000 mg by mouth every 4 hours as needed , Disp: , Rfl:     therapeutic multivitamin-minerals (THERAGRAN-M) tablet, Take 1 tablet by mouth daily. , Disp: , Rfl:     Cholecalciferol (VITAMIN D-3 PO), Take  by mouth 2 times daily. , Disp: , Rfl:     calcium 100 fL    MCH 30.7 27 - 34 pg    MCHC 33.3 32 - 36 g/dL    RDW 13.4 11.5 - 15.0 %    Platelets 477 610 - 864 X10E9/L    MPV 7.9 7 - 12 fL    Neutrophils % 53.3 %    Absolute Neut # 3.5 1.5 - 6.6 X10E9/L    Lymphocyte % 32.8 %    Absolute Lymph # 2.1 1.0 - 3.5 X10E9/L    Monocytes 8.4 %    Absolute Mono # 0.5 0 - 0.9 X10E9/L    Eosinophils % 4.8 %    Absolute Eos # 0.3 0.0 - 0.4 X10E9/L    Basophils % 0.7 %    Absolute Baso # 0.0 0.0 - 0.2 X10E9/L          Results for orders placed during the hospital encounter of 11/14/19   MRI BRAIN W WO CONTRAST    Narrative PROCEDURE: MRI BRAIN W WO CONTRAST    CLINICAL INFORMATIONMultiple sclerosis ([facility] [address] Multiple sclerosis diagnosed in 1999. Yearly MRI. No new issues. She does have gait issues. COMPARISON: Brain MRI 12/14/2018. TECHNIQUE: Multiplanar and multiple spin echo T1 and T2-weighted images were obtained through the brain before and after the administration of intravenous contrast. High resolution sagittal FLAIR images were also obtained. FINDINGS:    Current lesion burden of white matter: Very mild  Interval since the most recent exam: 12 months. Interval new lesion development: None. Number of pathologically enhancing lesions of the white matter: None. The diffusion-weighted images are normal. The brain volume is normal.There are no intra-or extra-axial collections. There is no hydrocephalus, midline shift or mass effect. On the FLAIR and T2-weighted sequences, there is some faint signal hyperintensity in the dorsal juan carlos and around the fourth ventricle. There is minimal abnormal signal scattered in the white matter of the brain. No new white matter lesions are present. On the gradient echo T2-weighted images, there is mineralization in the medial aspects of the basal ganglia. No other areas of susceptibility artifact are present. There is no abnormal enhancement in the brain. The major intracranial vascular flow voids are present.   The

## 2021-03-18 ENCOUNTER — HOSPITAL ENCOUNTER (OUTPATIENT)
Dept: NURSING | Age: 61
Discharge: HOME | End: 2021-03-18
Payer: COMMERCIAL

## 2021-03-18 VITALS
OXYGEN SATURATION: 98 % | HEART RATE: 92 BPM | SYSTOLIC BLOOD PRESSURE: 159 MMHG | TEMPERATURE: 98.1 F | BODY MASS INDEX: 28.73 KG/M2 | WEIGHT: 162.2 LBS | RESPIRATION RATE: 18 BRPM | DIASTOLIC BLOOD PRESSURE: 86 MMHG

## 2021-03-18 DIAGNOSIS — G35 MULTIPLE SCLEROSIS, RELAPSING-REMITTING (HCC): Primary | ICD-10-CM

## 2021-03-18 DIAGNOSIS — G35 MULTIPLE SCLEROSIS (HCC): Primary | ICD-10-CM

## 2021-03-18 LAB — GLUCOSE BLD-MCNC: 80 MG/DL (ref 70–108)

## 2021-03-18 PROCEDURE — 82948 REAGENT STRIP/BLOOD GLUCOSE: CPT

## 2021-03-18 PROCEDURE — 96365 THER/PROPH/DIAG IV INF INIT: CPT

## 2021-03-18 PROCEDURE — 2580000003 HC RX 258: Performed by: PSYCHIATRY & NEUROLOGY

## 2021-03-18 PROCEDURE — 6360000002 HC RX W HCPCS: Performed by: PSYCHIATRY & NEUROLOGY

## 2021-03-18 RX ORDER — SODIUM CHLORIDE 0.9 % (FLUSH) 0.9 %
10 SYRINGE (ML) INJECTION PRN
Status: DISCONTINUED | OUTPATIENT
Start: 2021-03-18 | End: 2021-03-19 | Stop reason: HOSPADM

## 2021-03-18 RX ORDER — SODIUM CHLORIDE 0.9 % (FLUSH) 0.9 %
10 SYRINGE (ML) INJECTION PRN
Status: CANCELLED | OUTPATIENT
Start: 2021-03-19

## 2021-03-18 RX ORDER — METHYLPREDNISOLONE 4 MG/1
TABLET ORAL
Qty: 21 TABLET | Refills: 0 | Status: SHIPPED | OUTPATIENT
Start: 2021-03-21 | End: 2021-03-27

## 2021-03-18 RX ADMIN — SODIUM CHLORIDE 500 MG: 9 INJECTION, SOLUTION INTRAVENOUS at 14:45

## 2021-03-18 RX ADMIN — Medication 10 ML: at 15:17

## 2021-03-18 NOTE — TELEPHONE ENCOUNTER
Patient scheduled for [facility] OPN on 3/18/21 at 2:30 pm, 3/29/21 at 12 pm, and [facility] 3/20/21 at 12 pm. Patient notified and verbalized understanding.

## 2021-03-18 NOTE — TELEPHONE ENCOUNTER
----- Message from Gustabo Padilla MD sent at 3/18/2021 11:05 AM EDT -----  Please Start Patient on Solumedrol 500 mg IV daily for three days. Followed by oral medrol taper pack, thanks.

## 2021-03-18 NOTE — PROGRESS NOTES
1440  Pt ambulated into room for solumedrol infusion. Pt rights and responsibilities offered to pt. Pt infusion explained and questions answered. Pt offered snack and drink. Patient iv started and infusion running. Blood sugar was 80    1530 infusion complete and pt tolerated well. D/c instructions explained and pt verbalized understanding. Pt ambulated out for d/c. Patient returning tomorrow for next solumedrol infusion. Iv kept in for patient.      _m___ Safety:       (Environmental)   [place] to environment   Ensure ID band is correct and in place/ allergy band as needed   Assess for fall risk   Initiate fall precautions as applicable (fall band, side rails, etc.)   Call light within reach   Bed in low position/ wheels locked    _m___ Pain:        Assess pain level and characteristics   Administer analgesics as ordered   Assess effectiveness of pain management and report to MD as needed    __m__ Knowledge Deficit:   Assess baseline knowledge   Provide teaching at level of understanding   Provide teaching via preferred learning method   Evaluate teaching effectiveness    _m___ Hemodynamic/Respiratory Status:       (Pre and Post Procedure Monitoring)   Assess/Monitor vital signs and LOC   Assess Baseline SpO2 prior to any sedation   Obtain weight/height   Assess vital signs/ LOC until patient meets discharge criteria   Monitor procedure site and notify MD of any issues

## 2021-03-19 ENCOUNTER — HOSPITAL ENCOUNTER (OUTPATIENT)
Dept: NURSING | Age: 61
Discharge: HOME | End: 2021-03-19
Payer: COMMERCIAL

## 2021-03-19 VITALS
OXYGEN SATURATION: 100 % | RESPIRATION RATE: 18 BRPM | DIASTOLIC BLOOD PRESSURE: 77 MMHG | HEART RATE: 92 BPM | TEMPERATURE: 97.7 F | SYSTOLIC BLOOD PRESSURE: 137 MMHG

## 2021-03-19 DIAGNOSIS — G35 MULTIPLE SCLEROSIS, RELAPSING-REMITTING (HCC): Primary | ICD-10-CM

## 2021-03-19 LAB — GLUCOSE BLD-MCNC: 94 MG/DL (ref 70–108)

## 2021-03-19 PROCEDURE — 6360000002 HC RX W HCPCS: Performed by: PSYCHIATRY & NEUROLOGY

## 2021-03-19 PROCEDURE — 96365 THER/PROPH/DIAG IV INF INIT: CPT

## 2021-03-19 PROCEDURE — 82948 REAGENT STRIP/BLOOD GLUCOSE: CPT

## 2021-03-19 PROCEDURE — 2580000003 HC RX 258: Performed by: PSYCHIATRY & NEUROLOGY

## 2021-03-19 RX ORDER — SODIUM CHLORIDE 0.9 % (FLUSH) 0.9 %
10 SYRINGE (ML) INJECTION PRN
Status: CANCELLED | OUTPATIENT
Start: 2021-03-20

## 2021-03-19 RX ADMIN — SODIUM CHLORIDE 500 MG: 9 INJECTION, SOLUTION INTRAVENOUS at 12:09

## 2021-03-19 NOTE — PROGRESS NOTES
1201  Pt ambulated into room for solumedrol infusion. Pt rights and responsibilities offered to patient. Patient offered snack and drink and pt states she has no questions and is ready for her infusion. Pt infusion started.            _m___ Safety:       (Environmental)   [place] to environment   Ensure ID band is correct and in place/ allergy band as needed   Assess for fall risk   Initiate fall precautions as applicable (fall band, side rails, etc.)   Call light within reach   Bed in low position/ wheels locked    _m___ Pain:        Assess pain level and characteristics   Administer analgesics as ordered   Assess effectiveness of pain management and report to MD as needed    _m___ Knowledge Deficit:   Assess baseline knowledge   Provide teaching at level of understanding   Provide teaching via preferred learning method   Evaluate teaching effectiveness    _m___ Hemodynamic/Respiratory Status:       (Pre and Post Procedure Monitoring)   Assess/Monitor vital signs and LOC   Assess Baseline SpO2 prior to any sedation   Obtain weight/height   Assess vital signs/ LOC until patient meets discharge criteria   Monitor procedure site and notify MD of any issues

## 2021-03-19 NOTE — PROGRESS NOTES
1300:  Tolerated infusion well and pt discharged ambulatory with instructions.   Int removed per pt request.      _m___ Safety:       (Environmental)   [place] to environment   Ensure ID band is correct and in place/ allergy band as needed   Assess for fall risk   Initiate fall precautions as applicable (fall band, side rails, etc.)   Call light within reach   Bed in low position/ wheels locked    m____ Pain:        Assess pain level and characteristics   Administer analgesics as ordered   Assess effectiveness of pain management and report to MD as needed    _m___ Knowledge Deficit:   Assess baseline knowledge   Provide teaching at level of understanding   Provide teaching via preferred learning method   Evaluate teaching effectiveness  m  ____ Hemodynamic/Respiratory Status:       (Pre and Post Procedure Monitoring)   Assess/Monitor vital signs and LOC   Assess Baseline SpO2 prior to any sedation   Obtain weight/height   Assess vital signs/ LOC until patient meets discharge criteria   Monitor procedure site and notify MD of any issues

## 2021-03-20 ENCOUNTER — HOSPITAL ENCOUNTER (OUTPATIENT)
Dept: GENERAL RADIOLOGY | Age: 61
Discharge: HOME | End: 2021-03-20
Payer: COMMERCIAL

## 2021-03-20 VITALS
TEMPERATURE: 97.6 F | DIASTOLIC BLOOD PRESSURE: 89 MMHG | HEART RATE: 80 BPM | SYSTOLIC BLOOD PRESSURE: 156 MMHG | RESPIRATION RATE: 16 BRPM

## 2021-03-20 DIAGNOSIS — G35 MULTIPLE SCLEROSIS, RELAPSING-REMITTING (HCC): Primary | ICD-10-CM

## 2021-03-20 LAB — GLUCOSE BLD-MCNC: 79 MG/DL (ref 70–108)

## 2021-03-20 PROCEDURE — 82948 REAGENT STRIP/BLOOD GLUCOSE: CPT

## 2021-03-20 PROCEDURE — 6360000002 HC RX W HCPCS: Performed by: PSYCHIATRY & NEUROLOGY

## 2021-03-20 PROCEDURE — 2580000003 HC RX 258: Performed by: PSYCHIATRY & NEUROLOGY

## 2021-03-20 PROCEDURE — 96365 THER/PROPH/DIAG IV INF INIT: CPT

## 2021-03-20 RX ORDER — SODIUM CHLORIDE 0.9 % (FLUSH) 0.9 %
10 SYRINGE (ML) INJECTION PRN
Status: CANCELLED | OUTPATIENT
Start: 2021-03-20

## 2021-03-20 RX ORDER — SODIUM CHLORIDE 0.9 % (FLUSH) 0.9 %
10 SYRINGE (ML) INJECTION PRN
Status: DISCONTINUED | OUTPATIENT
Start: 2021-03-20 | End: 2021-03-21 | Stop reason: HOSPADM

## 2021-03-20 RX ADMIN — SODIUM CHLORIDE 500 MG: 9 INJECTION, SOLUTION INTRAVENOUS at 12:01

## 2021-03-20 NOTE — PROGRESS NOTES
__met__ Safety   GOAL: Patient will have ID or Allergy band on in the Urgent Care       (Environmental)   [place] to environment   Ensure ID band is correct and in place/ allergy band as needed   Assess for fall risk   Initiate fall precautions as applicable (fall band, side rails, etc.)   Call light within reach   Bed in low position/ wheels locked    __met__ Pain   GOAL:  Patient pain will be under control while here in the Urgent Care      Assess pain level and characteristics   Administer analgesics as ordered   Assess effectiveness of pain management and report to MD as needed    _met__ Knowledge Deficit:   GOAL: Patient will be educated on the medication they are receiving here in the Urgent Care   Assess baseline knowledge   Provide teaching at level of understanding   Provide teaching via preferred learning method   Evaluate teaching effectiveness    __met__ Hemodynamic/Respiratory Status:   GOAL: Patient vital signs will be assessed and monitored in the Urgent Care       (Pre and Post Procedure Monitoring)   Assess/Monitor vital signs and LOC   Assess Baseline SpO2 prior to any sedation   Obtain weight/height   Assess vital signs/ LOC until patient meets discharge criteria   Monitor procedure site and notify MD of any issues    __met__ Infection-Risk of Central Venous Catheter:   GOAL: Patient will be monitored for infection while in the Urgent Care   Monitor for infection signs and symptoms (catheter site redness, temperature elevation, etc)   Assess for infection risks   Educate regarding infection prevention   Manage central venous catheter (flushes/ dressing changes per protocol)    CARE PLAN END DATE: 3/20/21  3289

## 2021-03-25 ENCOUNTER — HOSPITAL ENCOUNTER (OUTPATIENT)
Dept: MRI IMAGING | Age: 61
Discharge: HOME | End: 2021-03-25
Payer: COMMERCIAL

## 2021-03-25 ENCOUNTER — HOSPITAL ENCOUNTER (OUTPATIENT)
Age: 61
Discharge: HOME | End: 2021-03-25
Payer: COMMERCIAL

## 2021-03-25 DIAGNOSIS — R26.0 ATAXIC GAIT: ICD-10-CM

## 2021-03-25 DIAGNOSIS — G35 MULTIPLE SCLEROSIS (HCC): ICD-10-CM

## 2021-03-25 LAB
CREATININE, WHOLE BLOOD: 0.7 MG/DL (ref 0.5–1.2)
ESTIMATED GFR, PCACC: > 90 ML/MIN/1.73M2

## 2021-03-25 PROCEDURE — A9579 GAD-BASE MR CONTRAST NOS,1ML: HCPCS | Performed by: PSYCHIATRY & NEUROLOGY

## 2021-03-25 PROCEDURE — 6360000004 HC RX CONTRAST MEDICATION: Performed by: PSYCHIATRY & NEUROLOGY

## 2021-03-25 PROCEDURE — 70553 MRI BRAIN STEM W/O & W/DYE: CPT

## 2021-03-25 PROCEDURE — 82565 ASSAY OF CREATININE: CPT

## 2021-03-25 RX ADMIN — GADOTERIDOL 15 ML: 279.3 INJECTION, SOLUTION INTRAVENOUS at 12:43

## 2021-03-26 ENCOUNTER — TELEPHONE (OUTPATIENT)
Dept: NEUROLOGY | Age: 61
End: 2021-03-26

## 2021-03-26 ENCOUNTER — OFFICE VISIT (OUTPATIENT)
Dept: NEUROLOGY | Age: 61
End: 2021-03-26
Payer: COMMERCIAL

## 2021-03-26 ENCOUNTER — HOSPITAL ENCOUNTER (OUTPATIENT)
Age: 61
Discharge: HOME | End: 2021-03-26
Payer: COMMERCIAL

## 2021-03-26 VITALS
DIASTOLIC BLOOD PRESSURE: 92 MMHG | HEIGHT: 63 IN | BODY MASS INDEX: 27.82 KG/M2 | WEIGHT: 157 LBS | HEART RATE: 82 BPM | SYSTOLIC BLOOD PRESSURE: 142 MMHG

## 2021-03-26 DIAGNOSIS — R26.0 ATAXIC GAIT: ICD-10-CM

## 2021-03-26 DIAGNOSIS — G35 MULTIPLE SCLEROSIS (HCC): Primary | ICD-10-CM

## 2021-03-26 LAB
HBV SURFACE AB TITR SER: NEGATIVE {TITER}
HEPATITIS B CORE IGM ANTIBODY: NEGATIVE
HEPATITIS B SURFACE ANTIGEN: NEGATIVE

## 2021-03-26 PROCEDURE — 36415 COLL VENOUS BLD VENIPUNCTURE: CPT

## 2021-03-26 PROCEDURE — 86706 HEP B SURFACE ANTIBODY: CPT

## 2021-03-26 PROCEDURE — 86705 HEP B CORE ANTIBODY IGM: CPT

## 2021-03-26 PROCEDURE — 99213 OFFICE O/P EST LOW 20 MIN: CPT | Performed by: NURSE PRACTITIONER

## 2021-03-26 PROCEDURE — 86704 HEP B CORE ANTIBODY TOTAL: CPT

## 2021-03-26 PROCEDURE — 87340 HEPATITIS B SURFACE AG IA: CPT

## 2021-03-26 NOTE — TELEPHONE ENCOUNTER
----- Message from Grisel Clements MD sent at 3/26/2021  4:08 AM EDT -----  Please let patient know MRI Brain is stable compared to prior study.   Grisel Clements MD 4:08 AM

## 2021-03-26 NOTE — PROGRESS NOTES
NEUROLOGY OUT PATIENT FOLLOW UP NOTE:  3/26/278224:33 PM    Gregory Capps is here for follow up for multiple sclerosis, dizziness, nystagmus. She is feeling significantly better compared to last evaluation. She did have IV solumedrol pulse treatments. No new numbness, tingling or weakness. She is still reporting some nystagmus in the left eye, however this is improved by 75%. Her symptoms wax and wane. No dizziness. She is off the Tecfidera. She is on amantadine. She comes in today with her  to discuss results of testing performed as well as the plan of care going forward. ROS:  Respiratory : no cough, no shortness of breath  Cardiac: no chest pain. No palpitations. Renal : no flank pain, no hematuria    Skin: no rash      No Known Allergies    Current Outpatient Medications:     methylPREDNISolone (MEDROL DOSEPACK) 4 MG tablet, Take 24 mg by mouth on Day 1, 20 mg on Day 2, 16 mg on Day 3, 12 mg on Day 4, 8 mg on Day 5, 4 mg on Day 6, Disp: 21 tablet, Rfl: 0    zinc 50 MG CAPS, Take 1 capsule by mouth daily, Disp: , Rfl:     amantadine (SYMMETREL) 100 MG capsule, Take 1 capsule by mouth 2 times daily, Disp: 180 capsule, Rfl: 2    baclofen (LIORESAL) 10 MG tablet, TAKE ONE TABLET THREE TIMES A DAY, BY MOUTH., Disp: 270 tablet, Rfl: 2    Ascorbic Acid (VITAMIN C ER PO), Take by mouth, Disp: , Rfl:     Handicap Placard [facility] [place], Exp. 2025, Disp: 1 each, Rfl: 0    cyclobenzaprine (FLEXERIL) 10 MG tablet, , Disp: , Rfl:     acetaminophen (TYLENOL) 500 MG tablet, Take 1,000 mg by mouth every 4 hours as needed , Disp: , Rfl:     therapeutic multivitamin-minerals (THERAGRAN-M) tablet, Take 1 tablet by mouth daily. , Disp: , Rfl:     Cholecalciferol (VITAMIN D-3 PO), Take  by mouth 2 times daily. , Disp: , Rfl:     calcium carbonate-vitamin D (CALCIUM + D) 600-200 MG-UNIT TABS, Take  by mouth 2 times daily. , Disp: , Rfl:     TECFIDERA 240 MG delayed release capsule, TAKE 1 CAPSULE (240MG) BY  MOUTH TWICE progressive. After detailed discussion with the patient and her  we agreed on the following plan. Plan:  1. Start Ocrevus  2. Stop Tecfidera   3. Continue with Amantadine  4. Take calcium and vitamin D supplements  5. Stay physically active  6. Report any new symptoms  7. Follow up in 3 months or sooner if needed. 8. Call if any questions or concerns. Time spent evaluating patient, reviewing records, counseling with more than 50% of the time spent for counseling was 24 min.     Joaquina Coppola CNP

## 2021-03-27 LAB — HEPATITIS B CORE TOTAL ANTIBODY: NONREACTIVE

## 2021-04-13 ENCOUNTER — OFFICE VISIT (OUTPATIENT)
Dept: FAMILY MEDICINE CLINIC | Age: 61
End: 2021-04-13
Payer: COMMERCIAL

## 2021-04-13 VITALS
WEIGHT: 160 LBS | TEMPERATURE: 97 F | RESPIRATION RATE: 16 BRPM | SYSTOLIC BLOOD PRESSURE: 130 MMHG | BODY MASS INDEX: 28.34 KG/M2 | HEART RATE: 85 BPM | DIASTOLIC BLOOD PRESSURE: 90 MMHG

## 2021-04-13 DIAGNOSIS — M15.9 PRIMARY OSTEOARTHRITIS INVOLVING MULTIPLE JOINTS: ICD-10-CM

## 2021-04-13 DIAGNOSIS — R42 VERTIGO: ICD-10-CM

## 2021-04-13 DIAGNOSIS — Z00.00 ROUTINE GENERAL MEDICAL EXAMINATION AT HEALTH CARE FACILITY: Primary | ICD-10-CM

## 2021-04-13 DIAGNOSIS — G35 MULTIPLE SCLEROSIS (HCC): ICD-10-CM

## 2021-04-13 DIAGNOSIS — I10 ESSENTIAL HYPERTENSION: ICD-10-CM

## 2021-04-13 PROCEDURE — 99396 PREV VISIT EST AGE 40-64: CPT | Performed by: FAMILY MEDICINE

## 2021-04-13 RX ORDER — CYCLOBENZAPRINE HCL 10 MG
10 TABLET ORAL NIGHTLY
Qty: 90 TABLET | Refills: 3 | Status: SHIPPED | OUTPATIENT
Start: 2021-04-13 | End: 2022-03-21 | Stop reason: SDUPTHER

## 2021-04-13 RX ORDER — LOSARTAN POTASSIUM AND HYDROCHLOROTHIAZIDE 12.5; 5 MG/1; MG/1
1 TABLET ORAL DAILY
Qty: 90 TABLET | Refills: 1 | Status: SHIPPED | OUTPATIENT
Start: 2021-04-13 | End: 2021-09-07 | Stop reason: SDUPTHER

## 2021-04-13 RX ORDER — GABAPENTIN 300 MG/1
300 CAPSULE ORAL DAILY
Qty: 90 CAPSULE | Refills: 1 | Status: SHIPPED | OUTPATIENT
Start: 2021-04-13 | End: 2021-09-07 | Stop reason: SDUPTHER

## 2021-04-13 SDOH — ECONOMIC STABILITY: TRANSPORTATION INSECURITY
IN THE PAST 12 MONTHS, HAS LACK OF TRANSPORTATION KEPT YOU FROM MEETINGS, WORK, OR FROM GETTING THINGS NEEDED FOR DAILY LIVING?: NO

## 2021-04-13 SDOH — ECONOMIC STABILITY: FOOD INSECURITY: WITHIN THE PAST 12 MONTHS, THE FOOD YOU BOUGHT JUST DIDN'T LAST AND YOU DIDN'T HAVE MONEY TO GET MORE.: NEVER TRUE

## 2021-04-13 SDOH — ECONOMIC STABILITY: FOOD INSECURITY: WITHIN THE PAST 12 MONTHS, YOU WORRIED THAT YOUR FOOD WOULD RUN OUT BEFORE YOU GOT MONEY TO BUY MORE.: NEVER TRUE

## 2021-04-13 SDOH — ECONOMIC STABILITY: TRANSPORTATION INSECURITY
IN THE PAST 12 MONTHS, HAS THE LACK OF TRANSPORTATION KEPT YOU FROM MEDICAL APPOINTMENTS OR FROM GETTING MEDICATIONS?: NO

## 2021-04-13 ASSESSMENT — PATIENT HEALTH QUESTIONNAIRE - PHQ9
SUM OF ALL RESPONSES TO PHQ QUESTIONS 1-9: 0
1. LITTLE INTEREST OR PLEASURE IN DOING THINGS: 0

## 2021-04-13 NOTE — PROGRESS NOTES
[address] [place], [place] [address] Matt Bhatti  Dept: [phone]  Dept Fax: [phone]  Loc: 98 Tobin Rivers is a 64 y.o. female who presents today for:  Chief Complaint   Patient presents with    Hypertension     BP has been elevated last few times at MS infusion and neurology appt            HPI:     HPI  Here for regular checkup. She is most concerned about elevated BP at recent visits with neurology and for infusions for MS. The systolic is reported by her to be from 035-261 and diastolic never higher then 85. Arthritis is controlled with OTC prn. Reviewed chart forpast medical history , surgical history , allergies, social history , family history and medications. Health Maintenance   Topic Date Due    Hepatitis C screen  Never done    HIV screen  Never done    Potassium monitoring  09/05/2020    DTaP/Tdap/Td vaccine (1 - Tdap) 04/13/2022 (Originally 3/19/1979)    Shingles Vaccine (1 of 2) 04/13/2022 (Originally 3/19/2010)    Breast cancer screen  09/15/2021    Creatinine monitoring  03/25/2022    Lipid screen  09/05/2024    Colon cancer screen colonoscopy  06/11/2025    Flu vaccine  Completed    COVID-19 Vaccine  Completed    Hepatitis A vaccine  Aged Out    Hepatitis B vaccine  Aged Out    Hib vaccine  Aged Out    Meningococcal (ACWY) vaccine  Aged Out    Pneumococcal 0-64 years Vaccine  Aged Out       Subjective:      Constitutional:Negative for fever, chills, diaphoresis, activity change, appetite change and fatigue. HENT: Negative for hearing loss, ear pain, congestion, sore throat, rhinorrhea, postnasal drip and ear discharge. Eyes: Negative for photophobia and visual disturbance. Respiratory: Negative for cough, chest tightness, shortness of breath and wheezing. Cardiovascular: Negative for chest pain and leg swelling.    Gastrointestinal: Negative for nausea, vomiting, abdominal pain, diarrhea and constipation. Genitourinary: Negative for dysuria, urgency and frequency. Neurological: Negative for weakness, light-headedness and headaches. Psychiatric/Behavioral: Negative for sleep disturbance.      :     Vitals:    04/13/21 1456   BP: (!) 130/90   Site: Left Upper Arm   Position: Sitting   Cuff Size: Medium Adult   Pulse: 85   Resp: 16   Temp: 97 °F (36.1 °C)   TempSrc: Temporal   Weight: 160 lb (72.6 kg)     Wt Readings from Last 3 Encounters:   04/13/21 160 lb (72.6 kg)   03/26/21 157 lb (71.2 kg)   03/18/21 162 lb 3.2 oz (73.6 kg)       Physical Exam  Constitutional: Vital signs are normal. She appears well-developed and well-nourished. She is active. HENT:   Head: Normocephalic and atraumatic. Right Ear: Tympanic membrane, external ear and ear canal normal. No drainage or tenderness. Left Ear: Tympanic membrane, external ear and ear canal normal. No drainage or tenderness. Nose: Nose normal. No mucosal edema or rhinorrhea. Mouth/Throat: Uvula is midline, oropharynx is clear and moist and mucous membranes are normal. Mucous membranes are not pale. Normal dentition. No posterior oropharyngeal edema or posterior oropharyngeal erythema. Eyes: Lids are normal. Right eye exhibits no chemosis and no discharge. Left eye exhibits no chemosis and no drainage. Right conjunctiva has no hemorrhage. Left conjunctiva has no hemorrhage. Right eye exhibits normal extraocular motion. Left eye exhibits normal extraocular motion. Right pupil is round and reactive. Left pupil is round and reactive. Pupils are equal.   Cardiovascular: Normal rate, regular rhythm, S1 normal, S2 normal and normal heart sounds. Exam reveals no gallop. No murmur heard. Pulmonary/Chest: Effort normal and breath sounds normal. No respiratory distress. She has no wheezes. She has no rhonchi. She has no rales. Abdominal: Soft.  Normal appearance and bowel sounds are normal. She exhibits no distension and no mass. There is no hepatosplenomegaly. No tenderness. She has no rigidity, no rebound and no guarding. No hernia. Musculoskeletal:        Right lower leg: She exhibits no edema. Left lower leg: She exhibits no edema. Neurological: She is alert. Assessment/Plan   Prasad Fernández was seen today for hypertension. Diagnoses and all orders for this visit:    Routine general medical examination at health care facility  -     CBC; Future  -     Comprehensive Metabolic Panel, Fasting; Future  -     Lipid Panel; Future  -     TSH With Reflex Ft4; Future    Multiple sclerosis (HCC)  -     cyclobenzaprine (FLEXERIL) 10 MG tablet; Take 1 tablet by mouth nightly  -     gabapentin (NEURONTIN) 300 MG capsule; Take 1 capsule by mouth daily for 123 days. Vertigo    Primary osteoarthritis involving multiple joints    Essential hypertension  -     losartan-hydroCHLOROthiazide (HYZAAR) 50-12.5 MG per tablet; Take 1 tablet by mouth daily    No change to medications   Continue healthy diet and exercise  Aspirin daily    Discussed use, benefit, and side effectsof prescribed medications. All patient questions answered. Pt voiced understanding. Reviewed health maintenance. Instructed to continue current medications, diet and exercise. Patient agreed with treatment plan. Followup as directed.      Electronically signed by Rosendo Henson MD

## 2021-04-26 ENCOUNTER — NURSE ONLY (OUTPATIENT)
Dept: FAMILY MEDICINE CLINIC | Age: 61
End: 2021-04-26
Payer: COMMERCIAL

## 2021-04-26 VITALS
RESPIRATION RATE: 20 BRPM | DIASTOLIC BLOOD PRESSURE: 82 MMHG | SYSTOLIC BLOOD PRESSURE: 128 MMHG | OXYGEN SATURATION: 98 % | HEART RATE: 88 BPM

## 2021-04-26 DIAGNOSIS — I10 ESSENTIAL HYPERTENSION: Primary | ICD-10-CM

## 2021-04-26 PROCEDURE — 99211 OFF/OP EST MAY X REQ PHY/QHP: CPT | Performed by: FAMILY MEDICINE

## 2021-04-26 NOTE — PROGRESS NOTES
Patient here for nurse visit BP check currently is asymptomatic with no concerns at this time    /82  P 88  Resp 20

## 2021-05-11 ENCOUNTER — HOSPITAL ENCOUNTER (OUTPATIENT)
Age: 61
Discharge: HOME | End: 2021-05-11
Payer: COMMERCIAL

## 2021-05-26 DIAGNOSIS — G35 MULTIPLE SCLEROSIS, RELAPSING-REMITTING (HCC): Primary | Chronic | ICD-10-CM

## 2021-05-26 RX ORDER — EPINEPHRINE 1 MG/ML
0.3 INJECTION, SOLUTION, CONCENTRATE INTRAVENOUS PRN
Status: CANCELLED | OUTPATIENT
Start: 2021-05-26

## 2021-05-26 RX ORDER — DIPHENHYDRAMINE HYDROCHLORIDE 50 MG/ML
50 INJECTION INTRAMUSCULAR; INTRAVENOUS ONCE
Status: CANCELLED | OUTPATIENT
Start: 2021-05-26

## 2021-05-26 RX ORDER — ACETAMINOPHEN 325 MG/1
650 TABLET ORAL ONCE
Status: CANCELLED | OUTPATIENT
Start: 2021-05-26

## 2021-05-26 RX ORDER — SODIUM CHLORIDE 0.9 % (FLUSH) 0.9 %
5-40 SYRINGE (ML) INJECTION PRN
Status: CANCELLED | OUTPATIENT
Start: 2021-05-26

## 2021-05-26 RX ORDER — SODIUM CHLORIDE 9 MG/ML
INJECTION, SOLUTION INTRAVENOUS CONTINUOUS
Status: CANCELLED | OUTPATIENT
Start: 2021-05-26

## 2021-05-26 RX ORDER — DIPHENHYDRAMINE HYDROCHLORIDE 50 MG/ML
50 INJECTION INTRAMUSCULAR; INTRAVENOUS ONCE
Status: CANCELLED | OUTPATIENT
Start: 2021-05-26 | End: 2021-05-26

## 2021-05-26 RX ORDER — HEPARIN SODIUM (PORCINE) LOCK FLUSH IV SOLN 100 UNIT/ML 100 UNIT/ML
500 SOLUTION INTRAVENOUS PRN
Status: CANCELLED | OUTPATIENT
Start: 2021-05-26

## 2021-05-26 RX ORDER — METHYLPREDNISOLONE SODIUM SUCCINATE 125 MG/2ML
100 INJECTION, POWDER, LYOPHILIZED, FOR SOLUTION INTRAMUSCULAR; INTRAVENOUS ONCE
Status: CANCELLED | OUTPATIENT
Start: 2021-05-26

## 2021-05-26 RX ORDER — METHYLPREDNISOLONE SODIUM SUCCINATE 125 MG/2ML
125 INJECTION, POWDER, LYOPHILIZED, FOR SOLUTION INTRAMUSCULAR; INTRAVENOUS ONCE
Status: CANCELLED | OUTPATIENT
Start: 2021-05-26 | End: 2021-05-26

## 2021-05-26 RX ORDER — SODIUM CHLORIDE 9 MG/ML
25 INJECTION, SOLUTION INTRAVENOUS PRN
Status: CANCELLED | OUTPATIENT
Start: 2021-05-26

## 2021-06-09 ENCOUNTER — HOSPITAL ENCOUNTER (OUTPATIENT)
Dept: NURSING | Age: 61
Discharge: HOME | End: 2021-06-09
Payer: COMMERCIAL

## 2021-06-09 VITALS
TEMPERATURE: 98 F | HEIGHT: 63 IN | RESPIRATION RATE: 18 BRPM | HEART RATE: 90 BPM | BODY MASS INDEX: 28.35 KG/M2 | WEIGHT: 160 LBS | SYSTOLIC BLOOD PRESSURE: 127 MMHG | OXYGEN SATURATION: 99 % | DIASTOLIC BLOOD PRESSURE: 66 MMHG

## 2021-06-09 DIAGNOSIS — G35 MULTIPLE SCLEROSIS, RELAPSING-REMITTING (HCC): Primary | ICD-10-CM

## 2021-06-09 PROCEDURE — 96365 THER/PROPH/DIAG IV INF INIT: CPT

## 2021-06-09 PROCEDURE — 6370000000 HC RX 637 (ALT 250 FOR IP): Performed by: NURSE PRACTITIONER

## 2021-06-09 PROCEDURE — 96366 THER/PROPH/DIAG IV INF ADDON: CPT

## 2021-06-09 PROCEDURE — 6360000002 HC RX W HCPCS: Performed by: NURSE PRACTITIONER

## 2021-06-09 PROCEDURE — 2580000003 HC RX 258: Performed by: NURSE PRACTITIONER

## 2021-06-09 RX ORDER — SODIUM CHLORIDE 0.9 % (FLUSH) 0.9 %
5-40 SYRINGE (ML) INJECTION PRN
Status: DISCONTINUED | OUTPATIENT
Start: 2021-06-09 | End: 2021-06-10 | Stop reason: HOSPADM

## 2021-06-09 RX ORDER — HEPARIN SODIUM (PORCINE) LOCK FLUSH IV SOLN 100 UNIT/ML 100 UNIT/ML
500 SOLUTION INTRAVENOUS PRN
Status: CANCELLED | OUTPATIENT
Start: 2021-06-23

## 2021-06-09 RX ORDER — DIPHENHYDRAMINE HYDROCHLORIDE 50 MG/ML
50 INJECTION INTRAMUSCULAR; INTRAVENOUS ONCE
Status: CANCELLED | OUTPATIENT
Start: 2021-06-23 | End: 2021-06-23

## 2021-06-09 RX ORDER — METHYLPREDNISOLONE SODIUM SUCCINATE 125 MG/2ML
100 INJECTION, POWDER, LYOPHILIZED, FOR SOLUTION INTRAMUSCULAR; INTRAVENOUS ONCE
Status: CANCELLED | OUTPATIENT
Start: 2021-06-23

## 2021-06-09 RX ORDER — DIPHENHYDRAMINE HYDROCHLORIDE 50 MG/ML
50 INJECTION INTRAMUSCULAR; INTRAVENOUS ONCE
Status: CANCELLED | OUTPATIENT
Start: 2021-06-23

## 2021-06-09 RX ORDER — SODIUM CHLORIDE 9 MG/ML
INJECTION, SOLUTION INTRAVENOUS CONTINUOUS
Status: ACTIVE | OUTPATIENT
Start: 2021-06-09 | End: 2021-06-09

## 2021-06-09 RX ORDER — ACETAMINOPHEN 325 MG/1
650 TABLET ORAL ONCE
Status: CANCELLED | OUTPATIENT
Start: 2021-06-23

## 2021-06-09 RX ORDER — SODIUM CHLORIDE 9 MG/ML
INJECTION, SOLUTION INTRAVENOUS CONTINUOUS
Status: CANCELLED | OUTPATIENT
Start: 2021-06-23

## 2021-06-09 RX ORDER — METHYLPREDNISOLONE SODIUM SUCCINATE 125 MG/2ML
125 INJECTION, POWDER, LYOPHILIZED, FOR SOLUTION INTRAMUSCULAR; INTRAVENOUS ONCE
Status: CANCELLED | OUTPATIENT
Start: 2021-06-23 | End: 2021-06-23

## 2021-06-09 RX ORDER — METHYLPREDNISOLONE SODIUM SUCCINATE 125 MG/2ML
100 INJECTION, POWDER, LYOPHILIZED, FOR SOLUTION INTRAMUSCULAR; INTRAVENOUS ONCE
Status: COMPLETED | OUTPATIENT
Start: 2021-06-09 | End: 2021-06-09

## 2021-06-09 RX ORDER — ACETAMINOPHEN 325 MG/1
650 TABLET ORAL ONCE
Status: COMPLETED | OUTPATIENT
Start: 2021-06-09 | End: 2021-06-09

## 2021-06-09 RX ORDER — DIPHENHYDRAMINE HYDROCHLORIDE 50 MG/ML
50 INJECTION INTRAMUSCULAR; INTRAVENOUS ONCE
Status: COMPLETED | OUTPATIENT
Start: 2021-06-09 | End: 2021-06-09

## 2021-06-09 RX ORDER — SODIUM CHLORIDE 9 MG/ML
25 INJECTION, SOLUTION INTRAVENOUS PRN
Status: CANCELLED | OUTPATIENT
Start: 2021-06-23

## 2021-06-09 RX ORDER — SODIUM CHLORIDE 0.9 % (FLUSH) 0.9 %
5-40 SYRINGE (ML) INJECTION PRN
Status: CANCELLED | OUTPATIENT
Start: 2021-06-23

## 2021-06-09 RX ADMIN — ACETAMINOPHEN 650 MG: 325 TABLET ORAL at 09:24

## 2021-06-09 RX ADMIN — Medication 10 ML: at 09:21

## 2021-06-09 RX ADMIN — OCRELIZUMAB 300 MG: 300 INJECTION INTRAVENOUS at 09:55

## 2021-06-09 RX ADMIN — METHYLPREDNISOLONE SODIUM SUCCINATE 100 MG: 125 INJECTION, POWDER, FOR SOLUTION INTRAMUSCULAR; INTRAVENOUS at 09:28

## 2021-06-09 RX ADMIN — SODIUM CHLORIDE: 9 INJECTION, SOLUTION INTRAVENOUS at 09:21

## 2021-06-09 RX ADMIN — DIPHENHYDRAMINE HYDROCHLORIDE 50 MG: 50 INJECTION INTRAMUSCULAR; INTRAVENOUS at 09:25

## 2021-06-09 ASSESSMENT — PAIN SCALES - GENERAL: PAINLEVEL_OUTOF10: 0

## 2021-06-09 ASSESSMENT — PAIN - FUNCTIONAL ASSESSMENT: PAIN_FUNCTIONAL_ASSESSMENT: 0-10

## 2021-06-09 NOTE — PROGRESS NOTES
_m___ Safety:       (Environmental)   [place] to environment   Ensure ID band is correct and in place/ allergy band as needed   Assess for fall risk   Initiate fall precautions as applicable (fall band, side rails, etc.)   Call light within reach   Bed in low position/ wheels locked    ___m_ Pain:        Assess pain level and characteristics   Administer analgesics as ordered   Assess effectiveness of pain management and report to MD as needed    ___m_ Knowledge Deficit:   Assess baseline knowledge   Provide teaching at level of understanding   Provide teaching via preferred learning method   Evaluate teaching effectiveness    __m__ Hemodynamic/Respiratory Status:       (Pre and Post Procedure Monitoring)   Assess/Monitor vital signs and LOC   Assess Baseline SpO2 prior to any sedation   Obtain weight/height   Assess vital signs/ LOC until patient meets discharge criteria   Monitor procedure site and notify MD of any issues

## 2021-06-23 ENCOUNTER — HOSPITAL ENCOUNTER (OUTPATIENT)
Dept: NURSING | Age: 61
Discharge: HOME | End: 2021-06-23
Payer: COMMERCIAL

## 2021-06-23 VITALS
RESPIRATION RATE: 18 BRPM | SYSTOLIC BLOOD PRESSURE: 138 MMHG | DIASTOLIC BLOOD PRESSURE: 90 MMHG | HEART RATE: 78 BPM | TEMPERATURE: 97.5 F | BODY MASS INDEX: 28.52 KG/M2 | WEIGHT: 161 LBS | OXYGEN SATURATION: 99 %

## 2021-06-23 DIAGNOSIS — G35 MULTIPLE SCLEROSIS, RELAPSING-REMITTING (HCC): Primary | ICD-10-CM

## 2021-06-23 PROCEDURE — 96365 THER/PROPH/DIAG IV INF INIT: CPT

## 2021-06-23 PROCEDURE — 6370000000 HC RX 637 (ALT 250 FOR IP): Performed by: NURSE PRACTITIONER

## 2021-06-23 PROCEDURE — 2580000003 HC RX 258: Performed by: NURSE PRACTITIONER

## 2021-06-23 PROCEDURE — 96375 TX/PRO/DX INJ NEW DRUG ADDON: CPT

## 2021-06-23 PROCEDURE — 96366 THER/PROPH/DIAG IV INF ADDON: CPT

## 2021-06-23 PROCEDURE — 6360000002 HC RX W HCPCS: Performed by: NURSE PRACTITIONER

## 2021-06-23 RX ORDER — SODIUM CHLORIDE 9 MG/ML
INJECTION, SOLUTION INTRAVENOUS CONTINUOUS
Status: CANCELLED | OUTPATIENT
Start: 2021-12-08

## 2021-06-23 RX ORDER — SODIUM CHLORIDE 0.9 % (FLUSH) 0.9 %
5-40 SYRINGE (ML) INJECTION PRN
Status: CANCELLED | OUTPATIENT
Start: 2021-12-08

## 2021-06-23 RX ORDER — DIPHENHYDRAMINE HYDROCHLORIDE 50 MG/ML
50 INJECTION INTRAMUSCULAR; INTRAVENOUS ONCE
Status: CANCELLED | OUTPATIENT
Start: 2021-12-08

## 2021-06-23 RX ORDER — HEPARIN SODIUM (PORCINE) LOCK FLUSH IV SOLN 100 UNIT/ML 100 UNIT/ML
500 SOLUTION INTRAVENOUS PRN
Status: CANCELLED | OUTPATIENT
Start: 2021-12-08

## 2021-06-23 RX ORDER — SODIUM CHLORIDE 0.9 % (FLUSH) 0.9 %
5-40 SYRINGE (ML) INJECTION PRN
Status: DISCONTINUED | OUTPATIENT
Start: 2021-06-23 | End: 2021-06-24 | Stop reason: HOSPADM

## 2021-06-23 RX ORDER — SODIUM CHLORIDE 9 MG/ML
25 INJECTION, SOLUTION INTRAVENOUS PRN
Status: CANCELLED | OUTPATIENT
Start: 2021-12-08

## 2021-06-23 RX ORDER — ACETAMINOPHEN 325 MG/1
650 TABLET ORAL ONCE
Status: CANCELLED | OUTPATIENT
Start: 2021-12-08

## 2021-06-23 RX ORDER — METHYLPREDNISOLONE SODIUM SUCCINATE 125 MG/2ML
100 INJECTION, POWDER, LYOPHILIZED, FOR SOLUTION INTRAMUSCULAR; INTRAVENOUS ONCE
Status: COMPLETED | OUTPATIENT
Start: 2021-06-23 | End: 2021-06-23

## 2021-06-23 RX ORDER — DIPHENHYDRAMINE HYDROCHLORIDE 50 MG/ML
50 INJECTION INTRAMUSCULAR; INTRAVENOUS ONCE
Status: COMPLETED | OUTPATIENT
Start: 2021-06-23 | End: 2021-06-23

## 2021-06-23 RX ORDER — METHYLPREDNISOLONE SODIUM SUCCINATE 125 MG/2ML
125 INJECTION, POWDER, LYOPHILIZED, FOR SOLUTION INTRAMUSCULAR; INTRAVENOUS ONCE
Status: CANCELLED | OUTPATIENT
Start: 2021-12-08 | End: 2021-12-08

## 2021-06-23 RX ORDER — ACETAMINOPHEN 325 MG/1
650 TABLET ORAL ONCE
Status: COMPLETED | OUTPATIENT
Start: 2021-06-23 | End: 2021-06-23

## 2021-06-23 RX ORDER — METHYLPREDNISOLONE SODIUM SUCCINATE 125 MG/2ML
100 INJECTION, POWDER, LYOPHILIZED, FOR SOLUTION INTRAMUSCULAR; INTRAVENOUS ONCE
Status: CANCELLED | OUTPATIENT
Start: 2021-12-08

## 2021-06-23 RX ORDER — SODIUM CHLORIDE 9 MG/ML
INJECTION, SOLUTION INTRAVENOUS CONTINUOUS
Status: ACTIVE | OUTPATIENT
Start: 2021-06-23 | End: 2021-06-23

## 2021-06-23 RX ORDER — DIPHENHYDRAMINE HYDROCHLORIDE 50 MG/ML
50 INJECTION INTRAMUSCULAR; INTRAVENOUS ONCE
Status: CANCELLED | OUTPATIENT
Start: 2021-12-08 | End: 2021-12-08

## 2021-06-23 RX ADMIN — Medication 10 ML: at 09:06

## 2021-06-23 RX ADMIN — METHYLPREDNISOLONE SODIUM SUCCINATE 100 MG: 125 INJECTION, POWDER, FOR SOLUTION INTRAMUSCULAR; INTRAVENOUS at 09:11

## 2021-06-23 RX ADMIN — DIPHENHYDRAMINE HYDROCHLORIDE 50 MG: 50 INJECTION INTRAMUSCULAR; INTRAVENOUS at 09:14

## 2021-06-23 RX ADMIN — SODIUM CHLORIDE: 9 INJECTION, SOLUTION INTRAVENOUS at 09:06

## 2021-06-23 RX ADMIN — ACETAMINOPHEN 650 MG: 325 TABLET ORAL at 09:09

## 2021-06-23 RX ADMIN — OCRELIZUMAB 300 MG: 300 INJECTION INTRAVENOUS at 09:30

## 2021-06-23 ASSESSMENT — PAIN - FUNCTIONAL ASSESSMENT: PAIN_FUNCTIONAL_ASSESSMENT: 0-10

## 2021-06-23 ASSESSMENT — PAIN SCALES - GENERAL: PAINLEVEL_OUTOF10: 0

## 2021-06-23 NOTE — PROGRESS NOTES
1330:Patient being discharged in stable condition. Written and verbal instructions given to patient and family, they voice no questions or concerns.

## 2021-06-23 NOTE — PROGRESS NOTES
__m__ Safety:       (Environmental)   [place] to environment   Ensure ID band is correct and in place/ allergy band as needed   Assess for fall risk   Initiate fall precautions as applicable (fall band, side rails, etc.)   Call light within reach   Bed in low position/ wheels locked    __m__ Pain:        Assess pain level and characteristics   Administer analgesics as ordered   Assess effectiveness of pain management and report to MD as needed    _m___ Knowledge Deficit:   Assess baseline knowledge   Provide teaching at level of understanding   Provide teaching via preferred learning method   Evaluate teaching effectiveness    _m___ Hemodynamic/Respiratory Status:       (Pre and Post Procedure Monitoring)   Assess/Monitor vital signs and LOC   Assess Baseline SpO2 prior to any sedation   Obtain weight/height   Assess vital signs/ LOC until patient meets discharge criteria   Monitor procedure site and notify MD of any issues

## 2021-07-02 ENCOUNTER — OFFICE VISIT (OUTPATIENT)
Dept: NEUROLOGY | Age: 61
End: 2021-07-02
Payer: COMMERCIAL

## 2021-07-02 VITALS
HEIGHT: 63 IN | SYSTOLIC BLOOD PRESSURE: 140 MMHG | BODY MASS INDEX: 28.74 KG/M2 | DIASTOLIC BLOOD PRESSURE: 88 MMHG | WEIGHT: 162.2 LBS | HEART RATE: 88 BPM

## 2021-07-02 DIAGNOSIS — G35 MULTIPLE SCLEROSIS, RELAPSING-REMITTING (HCC): Primary | ICD-10-CM

## 2021-07-02 DIAGNOSIS — R26.0 ATAXIC GAIT: ICD-10-CM

## 2021-07-02 PROCEDURE — 99213 OFFICE O/P EST LOW 20 MIN: CPT | Performed by: PSYCHIATRY & NEUROLOGY

## 2021-07-02 NOTE — PROGRESS NOTES
NEUROLOGY OUT PATIENT FOLLOW UP NOTE:  7/2/20219:45 AM    Manjit Benites is here for follow up for multiple sclerosis, dizziness, nystagmus. She is feeling significantly better compared to last evaluation after Ocrevus infusion. She comes in today with her  to discuss results of testing performed as well as the plan of care going forward. ROS:  Respiratory : no cough, no shortness of breath  Cardiac: no chest pain. No palpitations. Renal : no flank pain, no hematuria    Skin: no rash      No Known Allergies    Current Outpatient Medications:     Ocrelizumab (OCREVUS IV), Infuse 300 mg intravenously, Disp: , Rfl:     cyclobenzaprine (FLEXERIL) 10 MG tablet, Take 1 tablet by mouth nightly, Disp: 90 tablet, Rfl: 3    gabapentin (NEURONTIN) 300 MG capsule, Take 1 capsule by mouth daily for 123 days. , Disp: 90 capsule, Rfl: 1    losartan-hydroCHLOROthiazide (HYZAAR) 50-12.5 MG per tablet, Take 1 tablet by mouth daily, Disp: 90 tablet, Rfl: 1    zinc 50 MG CAPS, Take 1 capsule by mouth daily, Disp: , Rfl:     amantadine (SYMMETREL) 100 MG capsule, Take 1 capsule by mouth 2 times daily, Disp: 180 capsule, Rfl: 2    baclofen (LIORESAL) 10 MG tablet, TAKE ONE TABLET THREE TIMES A DAY, BY MOUTH., Disp: 270 tablet, Rfl: 2    Ascorbic Acid (VITAMIN C ER PO), Take by mouth, Disp: , Rfl:     Handicap Placard [facility], Exp. 2025, Disp: 1 each, Rfl: 0    acetaminophen (TYLENOL) 500 MG tablet, Take 1,000 mg by mouth every 4 hours as needed , Disp: , Rfl:     therapeutic multivitamin-minerals (THERAGRAN-M) tablet, Take 1 tablet by mouth daily. , Disp: , Rfl:     Cholecalciferol (VITAMIN D-3 PO), Take  by mouth 2 times daily. , Disp: , Rfl:     calcium carbonate-vitamin D (CALCIUM + D) 600-200 MG-UNIT TABS, Take  by mouth 2 times daily. , Disp: , Rfl:     I reviewed the past medical history, allergies, medications, social history and family history.        PE:   Vitals:    07/02/21 0922   BP: (!) 144/86   Pulse: 88 Weight: 162 lb 3.2 oz (73.6 kg)   Height: 5' 3\" (1.6 m)     General Appearance:  awake, alert, oriented, in no acute distress  Gen: NAD, Language is Intact. Skin: no rash, lesion, dry  to touch. warm  Head: no rash, no icterus  Neck: There is no carotid bruits. The Neck is supple. There is no neck lymphadenopathy. Neuro: CN 2-12: There is limited abduction of the left eye, which with reported blurred vision and left lateral gaze. Power 5/5 in right upper and right lower extremity. Mild left hemiparesis 4/5. Reflexes are  symmetric. Long tracts are intact. Cerebellar exam is Intact. Sensory exam is intact to light touch. Gait is limping. Musculoskeletal:  Has no hand arthritis, no limitation of ROM in any of the four extremities. Lower extremities no edema  The abdomen is soft,  intact bowel sounds. DATA:      Results for orders placed or performed during the hospital encounter of 03/26/21   Hepatitis B Core Antibody, IgM   Result Value Ref Range    Hep B Core Ab, IgM Negative    Hepatitis B Core Antibody, Total   Result Value Ref Range    Hep B Core Total Ab NONREACTIVE NR   Hepatitis B Surface Antibody   Result Value Ref Range    Hep B S Ab Negative    Hepatitis B Surface Antigen   Result Value Ref Range    Hepatitis B Surface Ag Negative       Results for orders placed during the hospital encounter of 03/25/21   MRI BRAIN W WO CONTRAST    Narrative PROCEDURE: MRI BRAIN W WO CONTRAST    INDICATION:Multiple sclerosis ([address] Ataxic gait. New onset blurred vision and ataxia. Follow-up. COMPARISON: MR brain dated 11/14/2019. TECHNIQUE: Multiplanar and multiple spin echo T1 and T2-weighted images were obtained through the brain before and after the administration of intravenous contrast. 15 mL ProHance was injected in the right AC.     FINDINGS:  There are a few small focal areas of T2/FLAIR prolongation in the periventricular white matter most notably in the left hemipons adjacent to the third ventricle, stable compared to prior exam. No new/interval lesion is identified. No focal areas of   restricted diffusion are present. No intra or extra-axial mass is identified. Following contrast demonstration, there are no focal areas of abnormal parenchymal or meningeal enhancement identified. The major vascular flow voids appear patent. Orbits are unremarkable. Paranasal sinuses and mastoid air cells are clear. Impression  Stable mild white matter lesions without new or enhancing lesion to suggest active demyelination. **This report has been created using voice recognition software. It may contain minor errors which are inherent in voice recognition technology. **      Final report electronically signed by Dr. Jane Rivers MD on 3/25/2021 4:44 PM            Assessment:     Diagnosis Orders   1. Multiple sclerosis, relapsing-remitting (HCC)     2. Ataxic gait          Follow up for MS. The patient is doing great after completing her second infusion of Ocrevus. She denies any side effects, she feels her energy levels improved, she is no longer complains of any blurred vision, her gait is improved. She denies any side effects of medication. I reviewed the patient's pertinent labs, and records from other providers. The patient was counseled to undergo CBC, hepatic panel to be performed in 10/2021, for therapeutic drug monitoring. She is also advised to continue with amantadine stay active take calcium and vitamin D supplements. Would consider MRI brain with and without contrast imaging after the second dose of the Trenia Mandes is given. Plan:  1. Continue Ocrevus infusions as planned  2. CBC hepatic panel October 2021  3. Continue with Amantadine  4. Take calcium and vitamin D supplements  5. Stay physically active  6. Report any new symptoms  7. Follow up in 4 months or sooner if needed. 8. Call if any questions or concerns. Total time 22 min.      Betty Allan MD

## 2021-07-02 NOTE — PATIENT INSTRUCTIONS
1. Continue Ocrevus infusions as planned  2. CBC hepatic panel October 2021  3. Continue with Amantadine  4. Take calcium and vitamin D supplements  5. Stay physically active  6. Report any new symptoms  7. Follow up in 4 months or sooner if needed. 8. Call if any questions or concerns.

## 2021-08-04 ENCOUNTER — APPOINTMENT (OUTPATIENT)
Dept: GENERAL RADIOLOGY | Age: 61
End: 2021-08-04
Payer: COMMERCIAL

## 2021-08-04 ENCOUNTER — HOSPITAL ENCOUNTER (EMERGENCY)
Age: 61
Discharge: HOME | End: 2021-08-04
Attending: FAMILY MEDICINE
Payer: COMMERCIAL

## 2021-08-04 VITALS
TEMPERATURE: 98.1 F | WEIGHT: 160 LBS | OXYGEN SATURATION: 98 % | BODY MASS INDEX: 28.34 KG/M2 | HEART RATE: 89 BPM | DIASTOLIC BLOOD PRESSURE: 79 MMHG | RESPIRATION RATE: 18 BRPM | SYSTOLIC BLOOD PRESSURE: 152 MMHG

## 2021-08-04 DIAGNOSIS — S86.912A STRAIN OF LEFT KNEE, INITIAL ENCOUNTER: Primary | ICD-10-CM

## 2021-08-04 PROCEDURE — 6360000002 HC RX W HCPCS: Performed by: FAMILY MEDICINE

## 2021-08-04 PROCEDURE — 96372 THER/PROPH/DIAG INJ SC/IM: CPT

## 2021-08-04 PROCEDURE — 73564 X-RAY EXAM KNEE 4 OR MORE: CPT

## 2021-08-04 PROCEDURE — 99284 EMERGENCY DEPT VISIT MOD MDM: CPT

## 2021-08-04 RX ORDER — KETOROLAC TROMETHAMINE 30 MG/ML
30 INJECTION, SOLUTION INTRAMUSCULAR; INTRAVENOUS ONCE
Status: COMPLETED | OUTPATIENT
Start: 2021-08-04 | End: 2021-08-04

## 2021-08-04 RX ADMIN — KETOROLAC TROMETHAMINE 30 MG: 30 INJECTION, SOLUTION INTRAMUSCULAR; INTRAVENOUS at 19:23

## 2021-08-04 ASSESSMENT — ENCOUNTER SYMPTOMS
WHEEZING: 0
ABDOMINAL PAIN: 0
SORE THROAT: 0
CHEST TIGHTNESS: 0
NAUSEA: 0
SINUS PRESSURE: 0
VOMITING: 0
COLOR CHANGE: 0
DIARRHEA: 0
SHORTNESS OF BREATH: 0
BACK PAIN: 0

## 2021-08-04 ASSESSMENT — PAIN DESCRIPTION - LOCATION: LOCATION: KNEE

## 2021-08-04 ASSESSMENT — PAIN SCALES - GENERAL
PAINLEVEL_OUTOF10: 4
PAINLEVEL_OUTOF10: 2

## 2021-08-04 ASSESSMENT — PAIN DESCRIPTION - ORIENTATION: ORIENTATION: LEFT

## 2021-08-04 ASSESSMENT — PAIN DESCRIPTION - DESCRIPTORS: DESCRIPTORS: CONSTANT

## 2021-08-04 NOTE — ED PROVIDER NOTES
[address] [facility]          CHIEF COMPLAINT       Chief Complaint   Patient presents with    Knee Pain       Nurses Notes reviewed and I agree except as noted in the HPI. HISTORY OF PRESENT ILLNESS    Venkata Wyatt is a 64 y.o. female who presents for evaluation of left knee pain. Patient states that she was walking to her home when she twisted her left knee and felt a popping sensation. She now notes pain in the anterior aspect of the knee and in the posterior aspect of the knee. At rest she rates her pain at 3/10 severity but when trying to stand or ambulate pain worsens. There is minimal swelling to the left knee. No bruising. No numbness or tingling in the leg. Patient states that she underwent arthroscopy many years ago but has had no other procedures involving the left knee. REVIEW OF SYSTEMS     Review of Systems   Constitutional: Negative for appetite change, chills, fatigue and fever. HENT: Negative for sinus pressure and sore throat. Respiratory: Negative for chest tightness, shortness of breath and wheezing. Cardiovascular: Negative for chest pain and leg swelling. Gastrointestinal: Negative for abdominal pain, diarrhea, nausea and vomiting. Genitourinary: Negative for dysuria, flank pain and frequency. Musculoskeletal: Positive for arthralgias and joint swelling. Negative for back pain, gait problem and neck stiffness. Skin: Negative for color change and rash. Neurological: Negative for dizziness, light-headedness and headaches. Psychiatric/Behavioral: Negative for agitation and hallucinations. The patient is not nervous/anxious. PAST MEDICAL HISTORY    has a past medical history of Arthritis, Double vision, and Multiple sclerosis ([facility] [address] SURGICAL HISTORY      has a past surgical history that includes laparoscopy (Left, ); Foot surgery (Right, 2014);   section; and Hysterectomy, total abdominal (??). CURRENT MEDICATIONS       Discharge Medication List as of 2021  8:02 PM      CONTINUE these medications which have NOT CHANGED    Details   Ocrelizumab (OCREVUS IV) Infuse 300 mg intravenouslyHistorical Med      cyclobenzaprine (FLEXERIL) 10 MG tablet Take 1 tablet by mouth nightly, Disp-90 tablet, R-3Normal      gabapentin (NEURONTIN) 300 MG capsule Take 1 capsule by mouth daily for 123 days. , Disp-90 capsule, R-1Normal      losartan-hydroCHLOROthiazide (HYZAAR) 50-12.5 MG per tablet Take 1 tablet by mouth daily, Disp-90 tablet, R-1Normal      zinc 50 MG CAPS Take 1 capsule by mouth dailyHistorical Med      amantadine (SYMMETREL) 100 MG capsule Take 1 capsule by mouth 2 times daily, Disp-180 capsule, R-2Normal      baclofen (LIORESAL) 10 MG tablet TAKE ONE TABLET THREE TIMES A DAY, BY MOUTH., Disp-270 tablet, R-2Normal      Ascorbic Acid (VITAMIN C ER PO) Take by mouthHistorical Med      Handicap [facility] Starting 2020, Disp-1 each, R-0, PrintExp.       acetaminophen (TYLENOL) 500 MG tablet Take 1,000 mg by mouth every 4 hours as needed       therapeutic multivitamin-minerals (THERAGRAN-M) tablet Take 1 tablet by mouth daily. Cholecalciferol (VITAMIN D-3 PO) Take  by mouth 2 times daily. calcium carbonate-vitamin D (CALCIUM + D) 600-200 MG-UNIT TABS Take  by mouth 2 times daily. ALLERGIES     has No Known Allergies. FAMILY HISTORY     She indicated that her mother is alive. She indicated that her father is . She indicated that the status of her sister is unknown. She indicated that the status of her brother is unknown. She indicated that the status of her paternal grandfather is unknown. She indicated that the status of her maternal uncle is unknown.  She indicated that the status of her maternal cousin is unknown.   family history includes Breast Cancer in her maternal cousin and maternal uncle; Breast Cancer (age of onset: 36) in her sister; Cancer in her maternal cousin; Diabetes in her brother, mother, paternal grandfather, and sister; High Blood Pressure in her sister; High Cholesterol in her sister; Other in her sister; Stroke in her mother. SOCIAL HISTORY      reports that she has never smoked. She has never used smokeless tobacco. She reports current alcohol use. She reports that she does not use drugs. PHYSICAL EXAM     INITIAL VITALS:  weight is 160 lb (72.6 kg). Her oral temperature is 98.1 °F (36.7 °C). Her blood pressure is 152/79 (abnormal) and her pulse is 89. Her respiration is 18 and oxygen saturation is 98%. Physical Exam  Vitals and nursing note reviewed. Constitutional:       General: She is not in acute distress. Appearance: She is well-developed. HENT:      Head: Normocephalic and atraumatic. Eyes:      General:         Right eye: No discharge. Left eye: No discharge. Conjunctiva/sclera: Conjunctivae normal.   Cardiovascular:      Rate and Rhythm: Normal rate and regular rhythm. Heart sounds: Normal heart sounds. Pulmonary:      Effort: Pulmonary effort is normal.      Breath sounds: Normal breath sounds. Abdominal:      General: Bowel sounds are normal. There is no distension. Palpations: Abdomen is soft. There is no mass. Tenderness: There is no abdominal tenderness. Musculoskeletal:         General: Swelling (minimal left anterior knee) and tenderness (left patellar tendon) present. Normal range of motion. Right lower leg: No edema. Left lower leg: No edema. Comments: Negative Lachman's, anterior drawer, and posterior drawer test on the left. There is some crepitations with movement of the left knee. Skin:     General: Skin is warm and dry.       Comments: Hyperpigmented skin noted about the right distal leg   Psychiatric:         Mood and Affect: Mood normal.         Behavior: Behavior normal.         DIFFERENTIAL DIAGNOSIS:   Patellar tendon strain, knee strain, ligamentous tear, meniscal injury    DIAGNOSTIC RESULTS       RADIOLOGY: non-plain filmimages(s) such as CT, Ultrasound and MRI are read by the radiologist.  XR KNEE LEFT (MIN 4 VIEWS)   Final Result   Degenerative changes with no acute fracture or dislocation. **This report has been created using voice recognition software. It may contain minor errors which are inherent in voice recognition technology. **      Final report electronically signed by Dr Angelina Clements on 8/4/2021 7:39 PM            LABS:   Labs Reviewed - No data to display    DEPARTMENT COURSE:   Vitals:    Vitals:    08/04/21 1840   BP: (!) 152/79   Pulse: 89   Resp: 18   Temp: 98.1 °F (36.7 °C)   TempSrc: Oral   SpO2: 98%   Weight: 160 lb (72.6 kg)       MDM:  Patient resents for evaluation of left knee pain. Patient diagnosed with strain. Patient has most tenderness in the patellar tendon. She is recommend to ice the affected region, take 1 Aleve liquid capsule twice daily with food as she could not tolerate naproxen that was prescribed to her couple of years ago. She is recommend to use Ace wrap for compression. She will follow-up with her PCP or with Dr. Edwina Ko if pain persists. CRITICAL CARE:   None    CONSULTS:  None    PROCEDURES:  None    FINAL IMPRESSION      1.  Strain of left knee, initial encounter          DISPOSITION/PLAN   Discharge    PATIENT REFERRED TO:  [address], [address] [address]  [phone]    Schedule an appointment as soon as possible for a visit   As needed    Ana Jerez MD  [address] [place] [address],[facility] [address] [facility]  [phone]    Schedule an appointment as soon as possible for a visit   at the [facility] location on 8/20/2021, As needed      DISCHARGEMEDICATIONS:  Discharge Medication List as of 8/4/2021  8:02 PM          (Please note that portions of this note were completedwith a voice recognition program.  Efforts were made to edit the dictations but occasionally words are mis-transcribed.)    MD Venancio Alvarado MD  08/04/21 2022

## 2021-08-04 NOTE — ED NOTES
PRE-SEDATION ASSESSMENT    CONSENT  Consent for procedure and sedation obtained: Yes    MEDICAL HISTORY  Significant medical/surgical history: Yes  Past Complications with Sedation/Anesthesia: No  Significant Family History: No  Smoking History: No  Alcohol/Drug abuse: No  Possible Pregnancy (LMP): No  Cardiac History: Yes  Respiratory History: No    PHYSICAL EXAM  History and Physical Reviewed: H&P completed today  Airway Risk History: No previous complications  Airway Anatomy : Class II  Heart : Normal  Lungs : Normal  LOC/Mental Status : Normal    OTHER FINDINGS  Reviewed current medications and allergies: Yes  Pertinent lab/diagnostic test reviewed: Yes    SEDATION RISK ASSESSMENT  Risk Status ASA: Class II - Normal patient with mild systemic disease  Plan for Sedation: Moderate Sedation  Indications for Procedure/Pre-Procedure Diagnosis and Planned Procedure: heme + stool  EKG Monitoring: Yes    NARRATIVE FINDINGS      Patient presents today complaining of left knee pain. She reports walking into her house and twisting her knee slightly then feeling a \"pop\". She reports having a previous surgery to the left knee but can't remember what for. Rates pain 3/10. No swelling or deformity noted to left knee. Patient requested wheelchair.      Beth Huffman RN  08/04/21 8907

## 2021-08-05 NOTE — ED NOTES
Compression wrap applied to left knee. Pt instructed on use and verbalized understanding. Pt given discharge instructions. Verbalized understanding and left per wheelchair. Pt in stable condition.       Maricel Ward RN  08/04/21 2009

## 2021-08-06 ENCOUNTER — TELEPHONE (OUTPATIENT)
Dept: FAMILY MEDICINE CLINIC | Age: 61
End: 2021-08-06

## 2021-08-11 ENCOUNTER — TELEPHONE (OUTPATIENT)
Dept: NEUROLOGY | Age: 61
End: 2021-08-11

## 2021-08-11 DIAGNOSIS — G35 MULTIPLE SCLEROSIS, RELAPSING-REMITTING (HCC): Primary | ICD-10-CM

## 2021-08-11 NOTE — TELEPHONE ENCOUNTER
Patient due for CBC and HFP 2 months post first Ocrevus infusion. Spoke with patient who stated she will get CBC and HFP next week. Lab order mailed to address on profile.

## 2021-08-17 ENCOUNTER — HOSPITAL ENCOUNTER (OUTPATIENT)
Age: 61
Discharge: HOME | End: 2021-08-17
Payer: COMMERCIAL

## 2021-08-17 ENCOUNTER — HOSPITAL ENCOUNTER (OUTPATIENT)
Dept: MRI IMAGING | Age: 61
Discharge: HOME | End: 2021-08-17
Payer: COMMERCIAL

## 2021-08-17 DIAGNOSIS — G35 MULTIPLE SCLEROSIS, RELAPSING-REMITTING (HCC): ICD-10-CM

## 2021-08-17 DIAGNOSIS — M25.562 ACUTE PAIN OF LEFT KNEE: ICD-10-CM

## 2021-08-17 LAB
ALBUMIN SERPL-MCNC: 4.3 G/DL (ref 3.5–5.1)
ALP BLD-CCNC: 98 U/L (ref 38–126)
ALT SERPL-CCNC: 16 U/L (ref 11–66)
AST SERPL-CCNC: 16 U/L (ref 5–40)
BASOPHILS # BLD: 0.9 %
BASOPHILS ABSOLUTE: 0.1 THOU/MM3 (ref 0–0.1)
BILIRUB SERPL-MCNC: 0.3 MG/DL (ref 0.3–1.2)
BILIRUBIN DIRECT: < 0.2 MG/DL (ref 0–0.3)
EOSINOPHIL # BLD: 3.9 %
EOSINOPHILS ABSOLUTE: 0.2 THOU/MM3 (ref 0–0.4)
ERYTHROCYTE [DISTWIDTH] IN BLOOD BY AUTOMATED COUNT: 13 % (ref 11.5–14.5)
ERYTHROCYTE [DISTWIDTH] IN BLOOD BY AUTOMATED COUNT: 46 FL (ref 35–45)
HCT VFR BLD CALC: 42.6 % (ref 37–47)
HEMOGLOBIN: 13.2 GM/DL (ref 12–16)
IMMATURE GRANS (ABS): 0.01 THOU/MM3 (ref 0–0.07)
IMMATURE GRANULOCYTES: 0.2 %
LYMPHOCYTES # BLD: 28.1 %
LYMPHOCYTES ABSOLUTE: 1.6 THOU/MM3 (ref 1–4.8)
MCH RBC QN AUTO: 30.3 PG (ref 26–33)
MCHC RBC AUTO-ENTMCNC: 31 GM/DL (ref 32.2–35.5)
MCV RBC AUTO: 97.9 FL (ref 81–99)
MONOCYTES # BLD: 9.6 %
MONOCYTES ABSOLUTE: 0.6 THOU/MM3 (ref 0.4–1.3)
NUCLEATED RED BLOOD CELLS: 0 /100 WBC
PLATELET # BLD: 300 THOU/MM3 (ref 130–400)
PMV BLD AUTO: 9.9 FL (ref 9.4–12.4)
RBC # BLD: 4.35 MILL/MM3 (ref 4.2–5.4)
SEG NEUTROPHILS: 57.3 %
SEGMENTED NEUTROPHILS ABSOLUTE COUNT: 3.3 THOU/MM3 (ref 1.8–7.7)
TOTAL PROTEIN: 6.9 G/DL (ref 6.1–8)
WBC # BLD: 5.8 THOU/MM3 (ref 4.8–10.8)

## 2021-08-17 PROCEDURE — 85025 COMPLETE CBC W/AUTO DIFF WBC: CPT

## 2021-08-17 PROCEDURE — 80076 HEPATIC FUNCTION PANEL: CPT

## 2021-08-17 PROCEDURE — 36415 COLL VENOUS BLD VENIPUNCTURE: CPT

## 2021-08-17 PROCEDURE — 73721 MRI JNT OF LWR EXTRE W/O DYE: CPT

## 2021-09-07 DIAGNOSIS — G35 MULTIPLE SCLEROSIS (HCC): ICD-10-CM

## 2021-09-07 DIAGNOSIS — I10 ESSENTIAL HYPERTENSION: ICD-10-CM

## 2021-09-07 RX ORDER — GABAPENTIN 300 MG/1
300 CAPSULE ORAL DAILY
Qty: 90 CAPSULE | Refills: 1 | Status: SHIPPED | OUTPATIENT
Start: 2021-09-07 | End: 2022-03-21 | Stop reason: SDUPTHER

## 2021-09-07 RX ORDER — LOSARTAN POTASSIUM AND HYDROCHLOROTHIAZIDE 12.5; 5 MG/1; MG/1
1 TABLET ORAL DAILY
Qty: 90 TABLET | Refills: 1 | Status: SHIPPED | OUTPATIENT
Start: 2021-09-07 | End: 2022-03-21 | Stop reason: SDUPTHER

## 2021-09-15 ENCOUNTER — HOSPITAL ENCOUNTER (OUTPATIENT)
Dept: MAMMOGRAPHY | Age: 61
Discharge: HOME | End: 2021-09-15
Payer: COMMERCIAL

## 2021-09-15 DIAGNOSIS — Z12.39 SCREENING BREAST EXAMINATION: ICD-10-CM

## 2021-09-15 PROCEDURE — 77063 BREAST TOMOSYNTHESIS BI: CPT

## 2021-09-21 ENCOUNTER — TELEPHONE (OUTPATIENT)
Dept: FAMILY MEDICINE CLINIC | Age: 61
End: 2021-09-21

## 2021-09-21 NOTE — TELEPHONE ENCOUNTER
Pt states there is no way she was exposed to poision ivy. She is going to try to send a picture via [employer] [place]. Also pt spouse has appointment tomorrow afternoon.

## 2021-09-21 NOTE — TELEPHONE ENCOUNTER
Patient stopped in wanting to know if she can have something for a rash that started last night. Its only on her right forearm and its very itchy. Over the weekend, she was pulling weeds around her house otherwise, nothing has changed as far as lotions or detergents. Can something please be called in ?       Lorenzo  Call pt [phone]

## 2021-09-22 ENCOUNTER — NURSE ONLY (OUTPATIENT)
Dept: FAMILY MEDICINE CLINIC | Age: 61
End: 2021-09-22
Payer: COMMERCIAL

## 2021-09-22 DIAGNOSIS — L23.7 POISON IVY: Primary | ICD-10-CM

## 2021-09-22 PROCEDURE — 96372 THER/PROPH/DIAG INJ SC/IM: CPT | Performed by: FAMILY MEDICINE

## 2021-09-22 RX ORDER — TRIAMCINOLONE ACETONIDE 40 MG/ML
40 INJECTION, SUSPENSION INTRA-ARTICULAR; INTRAMUSCULAR ONCE
Status: COMPLETED | OUTPATIENT
Start: 2021-09-22 | End: 2021-09-22

## 2021-09-22 RX ADMIN — TRIAMCINOLONE ACETONIDE 40 MG: 40 INJECTION, SUSPENSION INTRA-ARTICULAR; INTRAMUSCULAR at 13:51

## 2021-09-23 ENCOUNTER — TELEPHONE (OUTPATIENT)
Dept: FAMILY MEDICINE CLINIC | Age: 61
End: 2021-09-23

## 2021-09-23 RX ORDER — METHYLPREDNISOLONE 4 MG/1
TABLET ORAL
Qty: 1 KIT | Refills: 0 | Status: SHIPPED | OUTPATIENT
Start: 2021-09-23 | End: 2021-09-28

## 2021-09-23 NOTE — TELEPHONE ENCOUNTER
----- Message from Luann Warren sent at 9/23/2021 11:46 AM EDT -----  Subject: Message to Provider    QUESTIONS  Information for Provider? Pt called stating their poison ivy worsened   overnight. pt was seen for their symptoms 9/22/21. please advise.   ---------------------------------------------------------------------------  --------------  CALL BACK INFO  What is the best way for the office to contact you? OK to leave message on   voicemail  Preferred Call Back Phone Number? [phone]  ---------------------------------------------------------------------------  --------------  SCRIPT ANSWERS  Relationship to Patient?  Self

## 2021-09-27 ENCOUNTER — TELEPHONE (OUTPATIENT)
Dept: FAMILY MEDICINE CLINIC | Age: 61
End: 2021-09-27

## 2021-09-27 NOTE — TELEPHONE ENCOUNTER
Pt needs a recheck on he posion ivy. She states it has gotten a lot worse over the weekend. Voiced 50% worse than when you saw her. Pt asking for an appointment. Please advise when to see her.

## 2021-09-28 ENCOUNTER — OFFICE VISIT (OUTPATIENT)
Dept: FAMILY MEDICINE CLINIC | Age: 61
End: 2021-09-28
Payer: COMMERCIAL

## 2021-09-28 VITALS
HEART RATE: 89 BPM | TEMPERATURE: 97.1 F | SYSTOLIC BLOOD PRESSURE: 160 MMHG | DIASTOLIC BLOOD PRESSURE: 90 MMHG | RESPIRATION RATE: 16 BRPM | BODY MASS INDEX: 29.41 KG/M2 | HEIGHT: 63 IN | OXYGEN SATURATION: 99 % | WEIGHT: 166 LBS

## 2021-09-28 DIAGNOSIS — L23.7 POISON IVY: ICD-10-CM

## 2021-09-28 DIAGNOSIS — L25.5 RHUS DERMATITIS: Primary | ICD-10-CM

## 2021-09-28 PROCEDURE — 99213 OFFICE O/P EST LOW 20 MIN: CPT | Performed by: FAMILY MEDICINE

## 2021-09-28 RX ORDER — PREDNISONE 10 MG/1
TABLET ORAL
Qty: 32 TABLET | Refills: 0 | Status: SHIPPED | OUTPATIENT
Start: 2021-09-28 | End: 2021-11-05

## 2021-09-28 RX ORDER — LEVOCETIRIZINE DIHYDROCHLORIDE 5 MG/1
5 TABLET, FILM COATED ORAL NIGHTLY
Qty: 30 TABLET | Refills: 3 | Status: SHIPPED | OUTPATIENT
Start: 2021-09-28 | End: 2021-11-05

## 2021-09-28 RX ORDER — DIPHENHYDRAMINE HCL 25 MG
25 CAPSULE ORAL EVERY 6 HOURS PRN
COMMUNITY
Start: 2021-09-28 | End: 2021-10-08

## 2021-09-28 RX ORDER — CEPHALEXIN 500 MG/1
500 CAPSULE ORAL 3 TIMES DAILY
Qty: 30 CAPSULE | Refills: 0 | Status: SHIPPED | OUTPATIENT
Start: 2021-09-28 | End: 2021-10-08

## 2021-09-28 NOTE — PROGRESS NOTES
[facility]  [address]  Dept: [phone]  Dept Fax: [phone]  Loc: [address] is a 64 y.o. female who presents today for:  Chief Complaint   Patient presents with    Rash     worsening           HPI:     HPI   Rash assumed to be poison ivy treated with kenalog injection and medrol dose pack is not getting better and in fact getting worse. She thinks she got this while working in [employer]. Reviewed chart forpast medical history , surgical history , allergies, social history , family history and medications. Health Maintenance   Topic Date Due    Hepatitis C screen  Never done    HIV screen  Never done    Potassium monitoring  09/05/2020    Flu vaccine (1) 09/01/2021    DTaP/Tdap/Td vaccine (1 - Tdap) 04/13/2022 (Originally 3/19/1979)    Shingles Vaccine (1 of 2) 04/13/2022 (Originally 3/19/2010)    Creatinine monitoring  03/25/2022    Breast cancer screen  09/15/2022    Lipid screen  09/05/2024    Colon cancer screen colonoscopy  06/11/2025    COVID-19 Vaccine  Completed    Hepatitis A vaccine  Aged Out    Hepatitis B vaccine  Aged Out    Hib vaccine  Aged Out    Meningococcal (ACWY) vaccine  Aged Out    Pneumococcal 0-64 years Vaccine  Aged Out       Subjective:      Constitutional:Negative for fever, chills, diaphoresis, activity change, appetite change and fatigue. HENT: Negative for hearing loss, ear pain, congestion, sore throat, rhinorrhea, postnasal drip and ear discharge. Eyes: Negative for photophobia and visual disturbance. Respiratory: Negative for cough, chest tightness, shortness of breath and wheezing. Cardiovascular: Negative for chest pain and leg swelling. Gastrointestinal: Negative for nausea, vomiting, abdominal pain, diarrhea and constipation. Genitourinary: Negative for dysuria, urgency and frequency.    Neurological: Negative rigidity, no rebound and no guarding. No hernia. Musculoskeletal:        Right lower leg: She exhibits no edema. Left lower leg: She exhibits no edema. Neurological: She is alert. Skin:   Blistery rash on pink patches scattered on the legs and arms. Only on exposed skin.  kebner reaction on arms and legs. Some patches or raised, warm, and pink. Assessment/Plan   Bang Tirado was seen today for rash. Diagnoses and all orders for this visit:    Rhus dermatitis  -     CBC; Future  -     Sedimentation Rate; Future  -     Comprehensive Metabolic Panel, Fasting; Future  -     predniSONE (DELTASONE) 10 MG tablet; 4 tabs for 3 days, 3 tabs for 3 days, 2 tabs for 3 days, 1 tab for 3 days, 1/2 tab for 4 days  -     levocetirizine (XYZAL) 5 MG tablet; Take 1 tablet by mouth nightly  -     cephALEXin (KEFLEX) 500 MG capsule; Take 1 capsule by mouth 3 times daily for 10 days  -     diphenhydrAMINE (BENADRYL ALLERGY) 25 MG capsule; Take 1 capsule by mouth every 6 hours as needed for Itching    Poison ivy  -     CBC; Future  -     Sedimentation Rate; Future  -     Comprehensive Metabolic Panel, Fasting; Future    keep clean and dry and use antibiotic topicals to keep form getting infected. Call or return to clinic prn if these symptoms worsen or fail to improve as anticipated. Discussed use, benefit, and side effectsof prescribed medications. All patient questions answered. Pt voiced understanding. Reviewed health maintenance. Instructed to continue current medications, diet and exercise. Patient agreed with treatment plan. Followup as directed.      Electronically signed by Livia Vera MD

## 2021-10-01 ENCOUNTER — HOSPITAL ENCOUNTER (OUTPATIENT)
Age: 61
Discharge: HOME | End: 2021-10-01
Payer: COMMERCIAL

## 2021-10-01 DIAGNOSIS — L25.5 RHUS DERMATITIS: ICD-10-CM

## 2021-10-01 DIAGNOSIS — L23.7 POISON IVY: ICD-10-CM

## 2021-10-01 LAB
ALBUMIN SERPL-MCNC: 4.3 G/DL (ref 3.5–5.1)
ALP BLD-CCNC: 86 U/L (ref 38–126)
ALT SERPL-CCNC: 13 U/L (ref 11–66)
ANION GAP SERPL CALCULATED.3IONS-SCNC: 14 MEQ/L (ref 8–16)
AST SERPL-CCNC: 12 U/L (ref 5–40)
BILIRUB SERPL-MCNC: 0.3 MG/DL (ref 0.3–1.2)
BUN BLDV-MCNC: 20 MG/DL (ref 7–22)
CALCIUM SERPL-MCNC: 9.7 MG/DL (ref 8.5–10.5)
CHLORIDE BLD-SCNC: 104 MEQ/L (ref 98–111)
CO2: 27 MEQ/L (ref 23–33)
CREAT SERPL-MCNC: 1 MG/DL (ref 0.4–1.2)
ERYTHROCYTE [DISTWIDTH] IN BLOOD BY AUTOMATED COUNT: 13.3 % (ref 11.5–14.5)
ERYTHROCYTE [DISTWIDTH] IN BLOOD BY AUTOMATED COUNT: 48.5 FL (ref 35–45)
GFR SERPL CREATININE-BSD FRML MDRD: 56 ML/MIN/1.73M2
GLUCOSE FASTING: 91 MG/DL (ref 70–108)
HCT VFR BLD CALC: 42.6 % (ref 37–47)
HEMOGLOBIN: 13.4 GM/DL (ref 12–16)
MCH RBC QN AUTO: 30.9 PG (ref 26–33)
MCHC RBC AUTO-ENTMCNC: 31.5 GM/DL (ref 32.2–35.5)
MCV RBC AUTO: 98.2 FL (ref 81–99)
PLATELET # BLD: 359 THOU/MM3 (ref 130–400)
PMV BLD AUTO: 9.2 FL (ref 9.4–12.4)
POTASSIUM SERPL-SCNC: 3.4 MEQ/L (ref 3.5–5.2)
RBC # BLD: 4.34 MILL/MM3 (ref 4.2–5.4)
SEDIMENTATION RATE, ERYTHROCYTE: 5 MM/HR (ref 0–20)
SODIUM BLD-SCNC: 145 MEQ/L (ref 135–145)
TOTAL PROTEIN: 6.5 G/DL (ref 6.1–8)
WBC # BLD: 12.6 THOU/MM3 (ref 4.8–10.8)

## 2021-10-01 PROCEDURE — 85027 COMPLETE CBC AUTOMATED: CPT

## 2021-10-01 PROCEDURE — 85651 RBC SED RATE NONAUTOMATED: CPT

## 2021-10-01 PROCEDURE — 80053 COMPREHEN METABOLIC PANEL: CPT

## 2021-10-01 PROCEDURE — 36415 COLL VENOUS BLD VENIPUNCTURE: CPT

## 2021-10-13 ENCOUNTER — OFFICE VISIT (OUTPATIENT)
Dept: FAMILY MEDICINE CLINIC | Age: 61
End: 2021-10-13
Payer: COMMERCIAL

## 2021-10-13 ENCOUNTER — TELEPHONE (OUTPATIENT)
Dept: NEUROLOGY | Age: 61
End: 2021-10-13

## 2021-10-13 VITALS
SYSTOLIC BLOOD PRESSURE: 124 MMHG | HEART RATE: 94 BPM | RESPIRATION RATE: 14 BRPM | BODY MASS INDEX: 29.3 KG/M2 | TEMPERATURE: 97 F | DIASTOLIC BLOOD PRESSURE: 80 MMHG | WEIGHT: 165.4 LBS

## 2021-10-13 DIAGNOSIS — I10 ESSENTIAL HYPERTENSION: Primary | ICD-10-CM

## 2021-10-13 DIAGNOSIS — G35 MULTIPLE SCLEROSIS (HCC): ICD-10-CM

## 2021-10-13 DIAGNOSIS — G35 MULTIPLE SCLEROSIS, RELAPSING-REMITTING (HCC): Primary | ICD-10-CM

## 2021-10-13 DIAGNOSIS — M15.9 PRIMARY OSTEOARTHRITIS INVOLVING MULTIPLE JOINTS: ICD-10-CM

## 2021-10-13 DIAGNOSIS — R42 VERTIGO: ICD-10-CM

## 2021-10-13 PROCEDURE — 99214 OFFICE O/P EST MOD 30 MIN: CPT | Performed by: FAMILY MEDICINE

## 2021-10-13 NOTE — PATIENT INSTRUCTIONS
Patient Education        Anterior Cruciate Ligament (ACL): Rehab Exercises  Introduction  Here are some examples of exercises for you to try. The exercises may be suggested for a condition or for rehabilitation. Start each exercise slowly. Ease off the exercises if you start to have pain. You will be told when to start these exercises and which ones will work best for you. How to do the exercises  Quad sets    1. Sit with your leg straight and supported on the floor or a firm bed. (If you feel discomfort in the front or back of your knee, place a small towel roll under your knee.)  2. Tighten the muscles on top of your thigh by pressing the back of your knee flat down to the floor. (If you feel discomfort under your kneecap, place a small towel roll under your knee.)  3. Hold for about 6 seconds, then rest for up to 10 seconds. 4. Do 8 to 12 repetitions several times a day. Straight-leg raises to the front    1. Lie on your back with your good knee bent so that your foot rests flat on the floor. Your injured leg should be straight. Make sure that your low back has a normal curve. You should be able to slip your flat hand in between the floor and the small of your back, with your palm touching the floor and your back touching the back of your hand. 2. Tighten the thigh muscles in the injured leg by pressing the back of your knee flat down to the floor. Hold your knee straight. 3. Keeping the thigh muscles tight, lift your injured leg up so that your heel is about 12 inches off the floor. Hold for about 6 seconds, and then lower slowly. 4. Do 8 to 12 repetitions, 3 times a day. Straight-leg raises to the outside    1. Lie on your side, with your injured leg on top. 2. Tighten the front thigh muscles of your injured leg to keep your knee straight. 3. Keep your hip and your leg straight in line with the rest of your body, and keep your knee pointing forward. Do not drop your hip back.   4. Lift your injured leg straight up toward the ceiling, about 12 inches off the floor. Hold for about 6 seconds, then slowly lower your leg. 5. Do 8 to 12 repetitions. Heel dig bridging    1. Lie on your back with both knees bent and your ankles bent so that only your heels are digging into the floor. Your knees should be bent about 90 degrees. 2. Then push your heels into the floor, squeeze your buttocks, and lift your hips off the floor until your shoulders, hips, and knees are all in a straight line. 3. Hold for about 6 seconds as you continue to breathe normally, and then slowly lower your hips back down to the floor and rest for up to 10 seconds. 4. Do 8 to 12 repetitions. Hamstring curls    1. Lie on your stomach with your knees straight. If your kneecap is uncomfortable, roll up a washcloth and put it under your leg just above your kneecap. 2. Lift the foot of your injured leg by bending the knee so that you bring the foot up toward your buttock. If this motion hurts, try it without bending your knee quite as far. This may help you avoid any painful motion. 3. Slowly lower your leg back to the floor. 4. Do 8 to 12 repetitions. 5. With permission from your doctor or physical therapist, you may also want to add a cuff weight to your ankle (not more than 5 pounds) or use soup cans in a plastic bag with the loops around your ankle. With weight, you do not have to lift your leg more than 12 inches to get a hamstring workout. Shallow standing knee bends    Do this exercise only if you have very little pain; if you have no clicking, locking, or giving way in the injured knee; and if it does not hurt while you are doing 8 to 12 repetitions. 1. Stand with your hands lightly resting on a counter or chair in front of you. Put your feet shoulder-width apart. 2. Slowly bend your knees so that you squat down like you are going to sit in a chair. Make sure that your knees do not go in front of your toes.   3. Lower yourself about 6 inches. Your heels should remain on the floor at all times. 4. Rise slowly to a standing position. Heel raises    1. Stand with your feet a few inches apart, with your hands lightly resting on a counter or chair in front of you. 2. Slowly raise your heels off the floor while keeping your knees straight. 3. Hold for about 6 seconds, then slowly lower your heels to the floor. 4. Do 8 to 12 repetitions several times during the day. Follow-up care is a key part of your treatment and safety. Be sure to make and go to all appointments, and call your doctor if you are having problems. It's also a good idea to know your test results and keep a list of the medicines you take. Where can you learn more? Go to https://[employer]."[employer]. org and sign in to your [employer] account. Enter G393 in the [employer] box to learn more about \"Anterior Cruciate Ligament (ACL): Rehab Exercises. \"     If you do not have an account, please click on the \"Sign Up Now\" link. Current as of: July 1, 2021               Content Version: 13.0  © 2006-2021 [employer]wise, Incorporated. Care instructions adapted under license by [facility] ([facility]). If you have questions about a medical condition or this instruction, always ask your healthcare professional. [address] any warranty or liability for your use of this information.

## 2021-10-13 NOTE — PROGRESS NOTES
[address] [place], [place] [address] [address]ti  Dept: [phone]  Dept Fax: [phone]  Loc: [address] is a 64 y.o. female who presents today for:  Chief Complaint   Patient presents with    6 Month Follow-Up           HPI:     HPI  Here for yearly checkup. She is most concerned about elevated BP at recent visits with neurology and for infusions for MS. The systolic is reported by her to be from 862-126 and diastolic never higher then 85. Arthritis is controlled with OTC prn. Reviewed chart forpast medical history , surgical history , allergies, social history , family history and medications. Health Maintenance   Topic Date Due    Hepatitis C screen  Never done    HIV screen  Never done    Flu vaccine (1) 09/01/2021    DTaP/Tdap/Td vaccine (1 - Tdap) 04/13/2022 (Originally 3/19/1979)    Shingles Vaccine (1 of 2) 04/13/2022 (Originally 3/19/2010)    Breast cancer screen  09/15/2022    Potassium monitoring  10/01/2022    Creatinine monitoring  10/01/2022    Lipid screen  09/05/2024    Colon cancer screen colonoscopy  06/11/2025    COVID-19 Vaccine  Completed    Hepatitis A vaccine  Aged Out    Hepatitis B vaccine  Aged Out    Hib vaccine  Aged Out    Meningococcal (ACWY) vaccine  Aged Out    Pneumococcal 0-64 years Vaccine  Aged Out       Subjective:      Constitutional:Negative for fever, chills, diaphoresis, activity change, appetite change and fatigue. HENT: Negative for hearing loss, ear pain, congestion, sore throat, rhinorrhea, postnasal drip and ear discharge. Eyes: Negative for photophobia and visual disturbance. Respiratory: Negative for cough, chest tightness, shortness of breath and wheezing. Cardiovascular: Negative for chest pain and leg swelling. Gastrointestinal: Negative for nausea, vomiting, abdominal pain, diarrhea and constipation.    Genitourinary: Negative for dysuria, urgency and frequency. Neurological: Negative for weakness, light-headedness and headaches. Psychiatric/Behavioral: Negative for sleep disturbance.      :     Vitals:    10/13/21 1342   BP: 124/80   Site: Left Upper Arm   Position: Sitting   Cuff Size: Large Adult   Pulse: 94   Resp: 14   Temp: 97 °F (36.1 °C)   TempSrc: Temporal   Weight: 165 lb 6.4 oz (75 kg)     Wt Readings from Last 3 Encounters:   10/13/21 165 lb 6.4 oz (75 kg)   09/28/21 166 lb (75.3 kg)   08/04/21 160 lb (72.6 kg)       Physical Exam  Physical Exam   Constitutional: Vital signs are normal. She appears well-developed and well-nourished. She is active. HENT:   Head: Normocephalic and atraumatic. Right Ear: Tympanic membrane, external ear and ear canal normal. No drainage or tenderness. Left Ear: Tympanic membrane, external ear and ear canal normal. No drainage or tenderness. Nose: Nose normal. No mucosal edema or rhinorrhea. Mouth/Throat: Uvula is midline, oropharynx is clear and moist and mucous membranes are normal. Mucous membranes are not pale. Normal dentition. No posterior oropharyngeal edema or posterior oropharyngeal erythema. Eyes: Lids are normal. Right eye exhibits no chemosis and no discharge. Left eye exhibits no chemosis and no drainage. Right conjunctiva has no hemorrhage. Left conjunctiva has no hemorrhage. Right eye exhibits normal extraocular motion. Left eye exhibits normal extraocular motion. Right pupil is round and reactive. Left pupil is round and reactive. Pupils are equal.   Cardiovascular: Normal rate, regular rhythm, S1 normal, S2 normal and normal heart sounds. Exam reveals no gallop. No murmur heard. Pulmonary/Chest: Effort normal and breath sounds normal. No respiratory distress. She has no wheezes. She has no rhonchi. She has no rales. Abdominal: Soft. Normal appearance and bowel sounds are normal. She exhibits no distension and no mass. There is no hepatosplenomegaly. No tenderness. She has no rigidity, no rebound and no guarding. No hernia. Musculoskeletal:        Right lower leg: She exhibits no edema. Left lower leg: She exhibits no edema. Neurological: She is alert. Assessment/Plan   Neal Avendano was seen today for 6 month follow-up. Diagnoses and all orders for this visit:    Essential hypertension    Multiple sclerosis ([facility] [address]    Vertigo    Primary osteoarthritis involving multiple joints      No change to medications   Continue healthy diet and exercise  Aspirin daily    Discussed use, benefit, and side effectsof prescribed medications. All patient questions answered. Pt voiced understanding. Reviewed health maintenance. Instructed to continue current medications, diet and exercise. Patient agreed with treatment plan. Followup as directed.      Electronically signed by Alma Joe MD

## 2021-10-13 NOTE — TELEPHONE ENCOUNTER
Patient had first Ocrevus infusion on 6/9/2021. Patient is due for 4 month follow up MRI brain. Left voice message for patient to return call to office.

## 2021-10-20 ENCOUNTER — TELEPHONE (OUTPATIENT)
Dept: NEUROLOGY | Age: 61
End: 2021-10-20

## 2021-10-20 ENCOUNTER — HOSPITAL ENCOUNTER (OUTPATIENT)
Dept: MRI IMAGING | Age: 61
Discharge: HOME | End: 2021-10-20
Payer: COMMERCIAL

## 2021-10-20 DIAGNOSIS — G35 MULTIPLE SCLEROSIS, RELAPSING-REMITTING (HCC): ICD-10-CM

## 2021-10-20 PROCEDURE — A9579 GAD-BASE MR CONTRAST NOS,1ML: HCPCS | Performed by: PSYCHIATRY & NEUROLOGY

## 2021-10-20 PROCEDURE — 70553 MRI BRAIN STEM W/O & W/DYE: CPT

## 2021-10-20 PROCEDURE — 6360000004 HC RX CONTRAST MEDICATION: Performed by: PSYCHIATRY & NEUROLOGY

## 2021-10-20 RX ADMIN — GADOTERIDOL 15 ML: 279.3 INJECTION, SOLUTION INTRAVENOUS at 12:55

## 2021-10-20 NOTE — TELEPHONE ENCOUNTER
----- Message from Teresa Petty MD sent at 10/20/2021  1:57 PM EDT -----  Please let patient know MRI brain is stable compared to prior study.   Teresa Petty MD 1:57 PM

## 2021-11-05 ENCOUNTER — OFFICE VISIT (OUTPATIENT)
Dept: NEUROLOGY | Age: 61
End: 2021-11-05
Payer: COMMERCIAL

## 2021-11-05 VITALS
DIASTOLIC BLOOD PRESSURE: 82 MMHG | WEIGHT: 165 LBS | BODY MASS INDEX: 29.23 KG/M2 | SYSTOLIC BLOOD PRESSURE: 120 MMHG | HEART RATE: 82 BPM | HEIGHT: 63 IN

## 2021-11-05 DIAGNOSIS — R26.0 ATAXIC GAIT: ICD-10-CM

## 2021-11-05 DIAGNOSIS — G35 MULTIPLE SCLEROSIS, RELAPSING-REMITTING (HCC): Primary | ICD-10-CM

## 2021-11-05 PROCEDURE — 99213 OFFICE O/P EST LOW 20 MIN: CPT | Performed by: PSYCHIATRY & NEUROLOGY

## 2021-11-05 NOTE — PROGRESS NOTES
NEUROLOGY OUT PATIENT FOLLOW UP NOTE:  11/5/202111:30 AM    Pierre Rosales is here for follow up for multiple sclerosis, dizziness, nystagmus. She comes in today with her  to discuss results of testing performed as well as the plan of care going forward. No Known Allergies    Current Outpatient Medications:     losartan-hydroCHLOROthiazide (HYZAAR) 50-12.5 MG per tablet, Take 1 tablet by mouth daily, Disp: 90 tablet, Rfl: 1    gabapentin (NEURONTIN) 300 MG capsule, Take 1 capsule by mouth daily for 123 days. , Disp: 90 capsule, Rfl: 1    Ocrelizumab (OCREVUS IV), Infuse 300 mg intravenously, Disp: , Rfl:     cyclobenzaprine (FLEXERIL) 10 MG tablet, Take 1 tablet by mouth nightly, Disp: 90 tablet, Rfl: 3    zinc 50 MG CAPS, Take 1 capsule by mouth daily, Disp: , Rfl:     amantadine (SYMMETREL) 100 MG capsule, Take 1 capsule by mouth 2 times daily, Disp: 180 capsule, Rfl: 2    baclofen (LIORESAL) 10 MG tablet, TAKE ONE TABLET THREE TIMES A DAY, BY MOUTH., Disp: 270 tablet, Rfl: 2    Ascorbic Acid (VITAMIN C ER PO), Take by mouth, Disp: , Rfl:     Handicap Placard [facility] [place], Disp: 1 each, Rfl: 0    acetaminophen (TYLENOL) 500 MG tablet, Take 1,000 mg by mouth every 4 hours as needed , Disp: , Rfl:     therapeutic multivitamin-minerals (THERAGRAN-M) tablet, Take 1 tablet by mouth daily. , Disp: , Rfl:     Cholecalciferol (VITAMIN D-3 PO), Take  by mouth 2 times daily. , Disp: , Rfl:     calcium carbonate-vitamin D (CALCIUM + D) 600-200 MG-UNIT TABS, Take  by mouth 2 times daily. , Disp: , Rfl:     I reviewed the past medical history, allergies, medications, social history and family history. PE:   Vitals:    11/05/21 1121   BP: 120/82   Pulse: 82   Weight: 165 lb (74.8 kg)   Height: 5' 3\" (1.6 m)     General Appearance:  awake, alert, oriented, in no acute distress, she is wearing mask. Gen: NAD, Language is Intact.  Skin: no rash, lesion, dry  to touch. warm  Head: no rash, no icterus  Neck: There is no carotid bruits. The Neck is supple. There is no neck lymphadenopathy. Neuro: CN 2-12: There is limited abduction of the left eye, which with reported blurred vision and left lateral gaze. Power 5/5 in right upper and right lower extremity. Mild left hemiparesis 4/5. Reflexes are  symmetric. Long tracts are intact. Cerebellar exam is Intact. Sensory exam is intact to light touch. Gait is limping. Musculoskeletal:  Has no hand arthritis, no limitation of ROM in any of the four extremities.   Lower extremities no edema        DATA:      Results for orders placed or performed during the hospital encounter of 10/01/21   Comprehensive Metabolic Panel, Fasting   Result Value Ref Range    Glucose, Fasting 91 70 - 108 mg/dl    CREATININE 1.0 0.4 - 1.2 mg/dL    BUN 20 7 - 22 mg/dL    Sodium 145 135 - 145 meq/L    Potassium 3.4 (L) 3.5 - 5.2 meq/L    Chloride 104 98 - 111 meq/L    CO2 27 23 - 33 meq/L    Calcium 9.7 8.5 - 10.5 mg/dL    AST 12 5 - 40 U/L    Alkaline Phosphatase 86 38 - 126 U/L    Total Protein 6.5 6.1 - 8.0 g/dL    Albumin 4.3 3.5 - 5.1 g/dL    Total Bilirubin 0.3 0.3 - 1.2 mg/dL    ALT 13 11 - 66 U/L   CBC   Result Value Ref Range    WBC 12.6 (H) 4.8 - 10.8 thou/mm3    RBC 4.34 4.20 - 5.40 mill/mm3    Hemoglobin 13.4 12.0 - 16.0 gm/dl    Hematocrit 42.6 37.0 - 47.0 %    MCV 98.2 81.0 - 99.0 fL    MCH 30.9 26.0 - 33.0 pg    MCHC 31.5 (L) 32.2 - 35.5 gm/dl    RDW-CV 13.3 11.5 - 14.5 %    RDW-SD 48.5 (H) 35.0 - 45.0 fL    Platelets 386 469 - 368 thou/mm3    MPV 9.2 (L) 9.4 - 12.4 fL   Sedimentation Rate   Result Value Ref Range    Sed Rate 5 0 - 20 mm/hr   Anion Gap   Result Value Ref Range    Anion Gap 14.0 8.0 - 16.0 meq/L   Glomerular Filtration Rate, Estimated   Result Value Ref Range    EstShana Filt Rate 56 (A) ml/min/1.73m2      Results for orders placed during the hospital encounter of 03/25/21   MRI BRAIN W WO CONTRAST    Narrative PROCEDURE: MRI BRAIN W WO CONTRAST    INDICATION:Multiple sclerosis ([address] Ataxic gait. New onset blurred vision and ataxia. Follow-up. COMPARISON: MR brain dated 11/14/2019. TECHNIQUE: Multiplanar and multiple spin echo T1 and T2-weighted images were obtained through the brain before and after the administration of intravenous contrast. 15 mL ProHance was injected in the right AC. FINDINGS:  There are a few small focal areas of T2/FLAIR prolongation in the periventricular white matter most notably in the left hemipons adjacent to the third ventricle, stable compared to prior exam. No new/interval lesion is identified. No focal areas of   restricted diffusion are present. No intra or extra-axial mass is identified. Following contrast demonstration, there are no focal areas of abnormal parenchymal or meningeal enhancement identified. The major vascular flow voids appear patent. Orbits are unremarkable. Paranasal sinuses and mastoid air cells are clear. Impression  Stable mild white matter lesions without new or enhancing lesion to suggest active demyelination. **This report has been created using voice recognition software. It may contain minor errors which are inherent in voice recognition technology. **      Final report electronically signed by Dr. Rashel Benitez MD on 3/25/2021 4:44 PM     MRI brain 10/20/2021: (reviewed)  Impression       1. Stable MRI scan of the brain with and without intravenous contrast, no interval change since previous study dated 25 March 2021.   2. Small plaques in the white matter and in the left side of juan carlos consistent with involvement by demyelinating disease, unchanged. 3. There is no restricted diffusion or abnormal enhancement noted. .                   **This report has been created using voice recognition software. It may contain minor errors which are inherent in voice recognition technology. **           Final report electronically signed by DR Abdiel Munoz on

## 2021-11-05 NOTE — PATIENT INSTRUCTIONS
1. Continue Ocrevus infusions as planned  2. CBC hepatic panel 2/2022. 3. Continue with Amantadine  4. Take calcium and vitamin D supplements  5. Stay physically active  6. Report any new symptoms  7. Follow up in 5 months or sooner if needed. 8. Call if any questions or concerns.

## 2021-11-15 DIAGNOSIS — G35 MULTIPLE SCLEROSIS (HCC): ICD-10-CM

## 2021-11-15 RX ORDER — AMANTADINE HYDROCHLORIDE 100 MG/1
100 CAPSULE, GELATIN COATED ORAL 2 TIMES DAILY
Qty: 180 CAPSULE | Refills: 2 | Status: SHIPPED | OUTPATIENT
Start: 2021-11-15 | End: 2021-12-17 | Stop reason: SDUPTHER

## 2021-11-15 RX ORDER — BACLOFEN 10 MG/1
TABLET ORAL
Qty: 270 TABLET | Refills: 2 | Status: SHIPPED | OUTPATIENT
Start: 2021-11-15 | End: 2022-03-21 | Stop reason: SDUPTHER

## 2021-12-17 DIAGNOSIS — G35 MULTIPLE SCLEROSIS (HCC): Primary | ICD-10-CM

## 2021-12-17 RX ORDER — AMANTADINE HYDROCHLORIDE 100 MG/1
100 CAPSULE, GELATIN COATED ORAL 2 TIMES DAILY
Qty: 60 CAPSULE | Refills: 5 | Status: SHIPPED | OUTPATIENT
Start: 2021-12-17 | End: 2022-07-26 | Stop reason: SDUPTHER

## 2021-12-22 ENCOUNTER — HOSPITAL ENCOUNTER (OUTPATIENT)
Dept: NURSING | Age: 61
Discharge: HOME | End: 2021-12-22
Payer: COMMERCIAL

## 2021-12-22 VITALS
WEIGHT: 162 LBS | SYSTOLIC BLOOD PRESSURE: 163 MMHG | OXYGEN SATURATION: 99 % | BODY MASS INDEX: 28.7 KG/M2 | TEMPERATURE: 97.6 F | DIASTOLIC BLOOD PRESSURE: 70 MMHG | RESPIRATION RATE: 18 BRPM | HEART RATE: 92 BPM

## 2021-12-22 DIAGNOSIS — G35 MULTIPLE SCLEROSIS, RELAPSING-REMITTING (HCC): Primary | ICD-10-CM

## 2021-12-22 PROCEDURE — 6370000000 HC RX 637 (ALT 250 FOR IP): Performed by: NURSE PRACTITIONER

## 2021-12-22 PROCEDURE — 96365 THER/PROPH/DIAG IV INF INIT: CPT

## 2021-12-22 PROCEDURE — 6360000002 HC RX W HCPCS: Performed by: NURSE PRACTITIONER

## 2021-12-22 PROCEDURE — 2580000003 HC RX 258: Performed by: NURSE PRACTITIONER

## 2021-12-22 PROCEDURE — 96366 THER/PROPH/DIAG IV INF ADDON: CPT

## 2021-12-22 RX ORDER — ACETAMINOPHEN 325 MG/1
650 TABLET ORAL ONCE
Status: CANCELLED | OUTPATIENT
Start: 2022-05-25

## 2021-12-22 RX ORDER — METHYLPREDNISOLONE SODIUM SUCCINATE 125 MG/2ML
100 INJECTION, POWDER, LYOPHILIZED, FOR SOLUTION INTRAMUSCULAR; INTRAVENOUS ONCE
Status: CANCELLED | OUTPATIENT
Start: 2022-05-25

## 2021-12-22 RX ORDER — SODIUM CHLORIDE 9 MG/ML
INJECTION, SOLUTION INTRAVENOUS CONTINUOUS
Status: CANCELLED | OUTPATIENT
Start: 2022-05-25

## 2021-12-22 RX ORDER — DIPHENHYDRAMINE HYDROCHLORIDE 50 MG/ML
50 INJECTION INTRAMUSCULAR; INTRAVENOUS ONCE
Status: COMPLETED | OUTPATIENT
Start: 2021-12-22 | End: 2021-12-22

## 2021-12-22 RX ORDER — SODIUM CHLORIDE 0.9 % (FLUSH) 0.9 %
5-40 SYRINGE (ML) INJECTION PRN
Status: CANCELLED | OUTPATIENT
Start: 2022-05-25

## 2021-12-22 RX ORDER — ACETAMINOPHEN 325 MG/1
650 TABLET ORAL ONCE
Status: COMPLETED | OUTPATIENT
Start: 2021-12-22 | End: 2021-12-22

## 2021-12-22 RX ORDER — DIPHENHYDRAMINE HYDROCHLORIDE 50 MG/ML
50 INJECTION INTRAMUSCULAR; INTRAVENOUS ONCE
Status: CANCELLED | OUTPATIENT
Start: 2022-05-25

## 2021-12-22 RX ORDER — DIPHENHYDRAMINE HYDROCHLORIDE 50 MG/ML
50 INJECTION INTRAMUSCULAR; INTRAVENOUS ONCE
Status: CANCELLED | OUTPATIENT
Start: 2022-05-25 | End: 2022-05-25

## 2021-12-22 RX ORDER — METHYLPREDNISOLONE SODIUM SUCCINATE 125 MG/2ML
100 INJECTION, POWDER, LYOPHILIZED, FOR SOLUTION INTRAMUSCULAR; INTRAVENOUS ONCE
Status: COMPLETED | OUTPATIENT
Start: 2021-12-22 | End: 2021-12-22

## 2021-12-22 RX ORDER — METHYLPREDNISOLONE SODIUM SUCCINATE 125 MG/2ML
125 INJECTION, POWDER, LYOPHILIZED, FOR SOLUTION INTRAMUSCULAR; INTRAVENOUS ONCE
Status: CANCELLED | OUTPATIENT
Start: 2022-05-25 | End: 2022-05-25

## 2021-12-22 RX ORDER — SODIUM CHLORIDE 9 MG/ML
25 INJECTION, SOLUTION INTRAVENOUS PRN
Status: CANCELLED | OUTPATIENT
Start: 2022-05-25

## 2021-12-22 RX ORDER — HEPARIN SODIUM (PORCINE) LOCK FLUSH IV SOLN 100 UNIT/ML 100 UNIT/ML
500 SOLUTION INTRAVENOUS PRN
Status: CANCELLED | OUTPATIENT
Start: 2022-05-25

## 2021-12-22 RX ORDER — SODIUM CHLORIDE 9 MG/ML
INJECTION, SOLUTION INTRAVENOUS CONTINUOUS
Status: ACTIVE | OUTPATIENT
Start: 2021-12-22 | End: 2021-12-22

## 2021-12-22 RX ADMIN — OCRELIZUMAB 600 MG: 300 INJECTION INTRAVENOUS at 08:53

## 2021-12-22 RX ADMIN — DIPHENHYDRAMINE HYDROCHLORIDE 50 MG: 50 INJECTION, SOLUTION INTRAMUSCULAR; INTRAVENOUS at 08:11

## 2021-12-22 RX ADMIN — SODIUM CHLORIDE: 9 INJECTION, SOLUTION INTRAVENOUS at 08:13

## 2021-12-22 RX ADMIN — METHYLPREDNISOLONE SODIUM SUCCINATE 100 MG: 125 INJECTION, POWDER, FOR SOLUTION INTRAMUSCULAR; INTRAVENOUS at 08:10

## 2021-12-22 RX ADMIN — ACETAMINOPHEN 650 MG: 325 TABLET ORAL at 08:12

## 2021-12-22 ASSESSMENT — PAIN SCALES - GENERAL: PAINLEVEL_OUTOF10: 0

## 2022-01-31 ENCOUNTER — HOSPITAL ENCOUNTER (OUTPATIENT)
Age: 62
Setting detail: SPECIMEN
Discharge: HOME | End: 2022-01-31
Payer: COMMERCIAL

## 2022-01-31 ENCOUNTER — TELEPHONE (OUTPATIENT)
Dept: FAMILY MEDICINE CLINIC | Age: 62
End: 2022-01-31

## 2022-01-31 DIAGNOSIS — R51.9 ACUTE NONINTRACTABLE HEADACHE, UNSPECIFIED HEADACHE TYPE: Primary | ICD-10-CM

## 2022-01-31 DIAGNOSIS — R05.9 COUGH: ICD-10-CM

## 2022-01-31 DIAGNOSIS — R51.9 ACUTE NONINTRACTABLE HEADACHE, UNSPECIFIED HEADACHE TYPE: ICD-10-CM

## 2022-01-31 PROCEDURE — U0003 INFECTIOUS AGENT DETECTION BY NUCLEIC ACID (DNA OR RNA); SEVERE ACUTE RESPIRATORY SYNDROME CORONAVIRUS 2 (SARS-COV-2) (CORONAVIRUS DISEASE [COVID-19]), AMPLIFIED PROBE TECHNIQUE, MAKING USE OF HIGH THROUGHPUT TECHNOLOGIES AS DESCRIBED BY CMS-2020-01-R: HCPCS

## 2022-01-31 PROCEDURE — U0005 INFEC AGEN DETEC AMPLI PROBE: HCPCS

## 2022-01-31 NOTE — TELEPHONE ENCOUNTER
Patient came in wanting to be tested for covid. She works at [employer] and on 1-30-22 started having headache, cough, chills, fever.  Pt will go to [facility]

## 2022-02-01 LAB
SARS-COV-2: DETECTED
SOURCE: ABNORMAL

## 2022-02-07 ENCOUNTER — TELEPHONE (OUTPATIENT)
Dept: FAMILY MEDICINE CLINIC | Age: 62
End: 2022-02-07

## 2022-02-07 DIAGNOSIS — U07.1 ACUTE BRONCHITIS DUE TO COVID-19 VIRUS: Primary | ICD-10-CM

## 2022-02-07 DIAGNOSIS — J20.8 ACUTE BRONCHITIS DUE TO COVID-19 VIRUS: Primary | ICD-10-CM

## 2022-02-07 RX ORDER — AZITHROMYCIN 250 MG/1
250 TABLET, FILM COATED ORAL SEE ADMIN INSTRUCTIONS
Qty: 6 TABLET | Refills: 0 | Status: SHIPPED | OUTPATIENT
Start: 2022-02-07 | End: 2022-02-12

## 2022-02-07 RX ORDER — PREDNISONE 10 MG/1
TABLET ORAL
Qty: 32 TABLET | Refills: 0 | Status: SHIPPED | OUTPATIENT
Start: 2022-02-07 | End: 2022-04-13 | Stop reason: ALTCHOICE

## 2022-02-07 NOTE — TELEPHONE ENCOUNTER
Pt is on day 8 of covid. No fever today. She is complaining of feeling like she has pneumonia. She has cough that leads into wheezing. She voiced she feels the same as the last time she had pneumonia. She does not have a pulse ox to check. [facility] pharmacy.

## 2022-02-07 NOTE — LETTER
NOTIFICATION RETURN TO WORK / SCHOOL    2/7/2022    Ms. Imer Umaña  [id number] [place] [address]      To Whom It May Concern:    Imer Umaña was tested for COVID-19 on 2/2, and the result was positive. She may return to work on 2/10 or when symptom free    I recommend:return without restrictions    If there are questions or concerns, please have the patient contact our office.         Sincerely,

## 2022-02-07 NOTE — TELEPHONE ENCOUNTER
rx sent to the pharmacy  Daily zinc, vitamin D and C  Push water and gatorade 60-80 oz daily  Eat 5-6 small meals daily with protein in all meals  Walk every 1.5 hours for 10-15 minutes  Call or return to clinic prn if these symptoms worsen or fail to improve as anticipated.

## 2022-02-24 ENCOUNTER — NURSE ONLY (OUTPATIENT)
Dept: LAB | Age: 62
End: 2022-02-24

## 2022-02-24 DIAGNOSIS — G35 MULTIPLE SCLEROSIS, RELAPSING-REMITTING (HCC): ICD-10-CM

## 2022-02-24 DIAGNOSIS — R26.0 ATAXIC GAIT: ICD-10-CM

## 2022-02-24 LAB
ALBUMIN SERPL-MCNC: 3.8 G/DL (ref 3.5–5.1)
ALP BLD-CCNC: 105 U/L (ref 38–126)
ALT SERPL-CCNC: 19 U/L (ref 11–66)
ANION GAP SERPL CALCULATED.3IONS-SCNC: 11 MEQ/L (ref 8–16)
AST SERPL-CCNC: 17 U/L (ref 5–40)
BASOPHILS # BLD: 0.7 %
BASOPHILS ABSOLUTE: 0.1 THOU/MM3 (ref 0–0.1)
BILIRUB SERPL-MCNC: 0.3 MG/DL (ref 0.3–1.2)
BUN BLDV-MCNC: 19 MG/DL (ref 7–22)
CALCIUM SERPL-MCNC: 9.7 MG/DL (ref 8.5–10.5)
CHLORIDE BLD-SCNC: 104 MEQ/L (ref 98–111)
CO2: 28 MEQ/L (ref 23–33)
CREAT SERPL-MCNC: 0.7 MG/DL (ref 0.4–1.2)
EOSINOPHIL # BLD: 2.9 %
EOSINOPHILS ABSOLUTE: 0.3 THOU/MM3 (ref 0–0.4)
ERYTHROCYTE [DISTWIDTH] IN BLOOD BY AUTOMATED COUNT: 13.4 % (ref 11.5–14.5)
ERYTHROCYTE [DISTWIDTH] IN BLOOD BY AUTOMATED COUNT: 47.7 FL (ref 35–45)
GFR SERPL CREATININE-BSD FRML MDRD: 85 ML/MIN/1.73M2
GLUCOSE BLD-MCNC: 59 MG/DL (ref 70–108)
HCT VFR BLD CALC: 43.3 % (ref 37–47)
HEMOGLOBIN: 13.2 GM/DL (ref 12–16)
IMMATURE GRANS (ABS): 0.03 THOU/MM3 (ref 0–0.07)
IMMATURE GRANULOCYTES: 0.3 %
LYMPHOCYTES # BLD: 25.4 %
LYMPHOCYTES ABSOLUTE: 2.8 THOU/MM3 (ref 1–4.8)
MCH RBC QN AUTO: 29.8 PG (ref 26–33)
MCHC RBC AUTO-ENTMCNC: 30.5 GM/DL (ref 32.2–35.5)
MCV RBC AUTO: 97.7 FL (ref 81–99)
MONOCYTES # BLD: 11.2 %
MONOCYTES ABSOLUTE: 1.2 THOU/MM3 (ref 0.4–1.3)
NUCLEATED RED BLOOD CELLS: 0 /100 WBC
PLATELET # BLD: 378 THOU/MM3 (ref 130–400)
PMV BLD AUTO: 8.9 FL (ref 9.4–12.4)
POTASSIUM SERPL-SCNC: 4.7 MEQ/L (ref 3.5–5.2)
RBC # BLD: 4.43 MILL/MM3 (ref 4.2–5.4)
SEG NEUTROPHILS: 59.5 %
SEGMENTED NEUTROPHILS ABSOLUTE COUNT: 6.5 THOU/MM3 (ref 1.8–7.7)
SODIUM BLD-SCNC: 143 MEQ/L (ref 135–145)
TOTAL PROTEIN: 6.7 G/DL (ref 6.1–8)
WBC # BLD: 11 THOU/MM3 (ref 4.8–10.8)

## 2022-03-21 DIAGNOSIS — G35 MULTIPLE SCLEROSIS (HCC): ICD-10-CM

## 2022-03-21 DIAGNOSIS — I10 ESSENTIAL HYPERTENSION: ICD-10-CM

## 2022-03-21 DIAGNOSIS — G35 MULTIPLE SCLEROSIS (HCC): Primary | ICD-10-CM

## 2022-03-21 RX ORDER — BACLOFEN 10 MG/1
TABLET ORAL
Qty: 270 TABLET | Refills: 2 | Status: SHIPPED | OUTPATIENT
Start: 2022-03-21 | End: 2022-10-06

## 2022-03-21 RX ORDER — CYCLOBENZAPRINE HCL 10 MG
10 TABLET ORAL NIGHTLY
Qty: 90 TABLET | Refills: 3 | Status: SHIPPED | OUTPATIENT
Start: 2022-03-21

## 2022-03-21 RX ORDER — GABAPENTIN 300 MG/1
300 CAPSULE ORAL DAILY
Qty: 90 CAPSULE | Refills: 1 | Status: SHIPPED | OUTPATIENT
Start: 2022-03-21 | End: 2022-09-09 | Stop reason: SDUPTHER

## 2022-03-21 RX ORDER — LOSARTAN POTASSIUM AND HYDROCHLOROTHIAZIDE 12.5; 5 MG/1; MG/1
1 TABLET ORAL DAILY
Qty: 90 TABLET | Refills: 1 | Status: SHIPPED | OUTPATIENT
Start: 2022-03-21 | End: 2022-10-19 | Stop reason: SDUPTHER

## 2022-04-08 ENCOUNTER — OFFICE VISIT (OUTPATIENT)
Dept: NEUROLOGY | Age: 62
End: 2022-04-08
Payer: COMMERCIAL

## 2022-04-08 VITALS
RESPIRATION RATE: 15 BRPM | HEART RATE: 84 BPM | WEIGHT: 165 LBS | DIASTOLIC BLOOD PRESSURE: 86 MMHG | BODY MASS INDEX: 29.23 KG/M2 | HEIGHT: 63 IN | SYSTOLIC BLOOD PRESSURE: 138 MMHG | OXYGEN SATURATION: 98 %

## 2022-04-08 DIAGNOSIS — R26.0 ATAXIC GAIT: ICD-10-CM

## 2022-04-08 DIAGNOSIS — G35 MULTIPLE SCLEROSIS (HCC): Primary | ICD-10-CM

## 2022-04-08 PROCEDURE — 99213 OFFICE O/P EST LOW 20 MIN: CPT | Performed by: PSYCHIATRY & NEUROLOGY

## 2022-04-08 NOTE — PROGRESS NOTES
NEUROLOGY OUT PATIENT FOLLOW UP NOTE:  4/8/202211:07 AM    Anirudh Crowell is here for follow up for multiple sclerosis, dizziness, nystagmus. She comes in today with her  to discuss results of testing performed as well as the plan of care going forward. No Known Allergies    Current Outpatient Medications:     baclofen (LIORESAL) 10 MG tablet, TAKE ONE TABLET THREE TIMES A DAY, BY MOUTH., Disp: 270 tablet, Rfl: 2    losartan-hydroCHLOROthiazide (HYZAAR) 50-12.5 MG per tablet, Take 1 tablet by mouth daily, Disp: 90 tablet, Rfl: 1    gabapentin (NEURONTIN) 300 MG capsule, Take 1 capsule by mouth daily for 123 days. , Disp: 90 capsule, Rfl: 1    cyclobenzaprine (FLEXERIL) 10 MG tablet, Take 1 tablet by mouth nightly, Disp: 90 tablet, Rfl: 3    predniSONE (DELTASONE) 10 MG tablet, 4 tabs for 3 days, 3 tabs for 3 days, 2 tabs for 3 days, 1 tab for 3 days, 1/2 tab for 4 days, Disp: 32 tablet, Rfl: 0    amantadine (SYMMETREL) 100 MG capsule, Take 1 capsule by mouth 2 times daily, Disp: 60 capsule, Rfl: 5    Ocrelizumab (OCREVUS IV), Infuse 300 mg intravenously, Disp: , Rfl:     zinc 50 MG CAPS, Take 1 capsule by mouth daily, Disp: , Rfl:     Ascorbic Acid (VITAMIN C ER PO), Take by mouth, Disp: , Rfl:     Handicap Placard [facility], Exp. 2025, Disp: 1 each, Rfl: 0    acetaminophen (TYLENOL) 500 MG tablet, Take 1,000 mg by mouth every 4 hours as needed , Disp: , Rfl:     therapeutic multivitamin-minerals (THERAGRAN-M) tablet, Take 1 tablet by mouth daily. , Disp: , Rfl:     Cholecalciferol (VITAMIN D-3 PO), Take  by mouth 2 times daily. , Disp: , Rfl:     calcium carbonate-vitamin D (CALCIUM + D) 600-200 MG-UNIT TABS, Take  by mouth 2 times daily. , Disp: , Rfl:     I reviewed the past medical history, allergies, medications, social history and family history.        PE:   Vitals:    04/08/22 1050   BP: 138/86   Pulse: 84   Resp: 15   SpO2: 98%   Weight: 165 lb (74.8 kg)   Height: 5' 3\" (1.6 m)     General 4.8 thou/mm3    Monocytes Absolute 1.2 0.4 - 1.3 thou/mm3    Eosinophils Absolute 0.3 0.0 - 0.4 thou/mm3    Basophils Absolute 0.1 0.0 - 0.1 thou/mm3    Immature Grans (Abs) 0.03 0.00 - 0.07 thou/mm3    nRBC 0 /100 wbc   Anion Gap   Result Value Ref Range    Anion Gap 11.0 8.0 - 16.0 meq/L   Glomerular Filtration Rate, Estimated   Result Value Ref Range    Est, Glom Filt Rate 85 (A) ml/min/1.73m2      Results for orders placed during the hospital encounter of 03/25/21   MRI BRAIN W WO CONTRAST    Narrative PROCEDURE: MRI BRAIN W WO CONTRAST    INDICATION:Multiple sclerosis ([address] Ataxic gait. New onset blurred vision and ataxia. Follow-up. COMPARISON: MR brain dated 11/14/2019. TECHNIQUE: Multiplanar and multiple spin echo T1 and T2-weighted images were obtained through the brain before and after the administration of intravenous contrast. 15 mL ProHance was injected in the right AC. FINDINGS:  There are a few small focal areas of T2/FLAIR prolongation in the periventricular white matter most notably in the left hemipons adjacent to the third ventricle, stable compared to prior exam. No new/interval lesion is identified. No focal areas of   restricted diffusion are present. No intra or extra-axial mass is identified. Following contrast demonstration, there are no focal areas of abnormal parenchymal or meningeal enhancement identified. The major vascular flow voids appear patent. Orbits are unremarkable. Paranasal sinuses and mastoid air cells are clear. Impression  Stable mild white matter lesions without new or enhancing lesion to suggest active demyelination. **This report has been created using voice recognition software. It may contain minor errors which are inherent in voice recognition technology. **      Final report electronically signed by Dr. Roberta Newsome MD on 3/25/2021 4:44 PM     MRI brain 10/20/2021: (reviewed)  Impression       1.  Stable MRI scan of the brain with and without intravenous contrast, no interval change since previous study dated 25 March 2021.   2. Small plaques in the white matter and in the left side of juan carlos consistent with involvement by demyelinating disease, unchanged. 3. There is no restricted diffusion or abnormal enhancement noted. .                   **This report has been created using voice recognition software. It may contain minor errors which are inherent in voice recognition technology. **           Final report electronically signed by DR Rosalio Murillo on 10/20/2021 1:49 PM            Assessment:     Diagnosis Orders   1. Multiple sclerosis ([facility] [address]     2. Ataxic gait          Patient is seen as follow-up for multiple sclerosis. She states that she is feeling significantly better since last evaluation, she no longer experiences dizziness, she feels her gait has improved, she is more steady. She has been receiving Ocrevus infusions she is scheduled for her next infusion in 6/2022. The patient denies any falls no vision changes no new numbness. Her most recent MRI brain performed 10/2021 was stable compared to prior study. She is very pleased with how she is doing. Her energy levels improved, and feels benefit to amantadine. I reviewed the patient pertinent labs and records in the EHR and from other providers. She is advised to continue with amantadine stay active take calcium and vitamin D supplements. After detailed discussion with patient we agreed on the following plan. Plan:  1. Continue Ocrevus infusions as planned 6/2022. 2. MRI brain with and without contrast 6/2022, please call us to schedule. 3. CBC hepatic panel 6/2022. 4. Continue with Amantadine  5. Take calcium and vitamin D supplements  6. Stay physically active  7. Report any new symptoms  8. Follow up in 6 months or sooner if needed. 9. Call if any questions or concerns. Total time 22 min.      Daniel Hurst MD

## 2022-04-08 NOTE — PATIENT INSTRUCTIONS
1. Continue Ocrevus infusions as planned 6/2022. 2. MRI brain with and without contrast 6/2022, please call us to schedule. 3. CBC hepatic panel 6/2022. 4. Continue with Amantadine  5. Take calcium and vitamin D supplements  6. Stay physically active  7. Report any new symptoms  8. Follow up in 6 months or sooner if needed. 9. Call if any questions or concerns.

## 2022-04-12 NOTE — PROGRESS NOTES
[address] [place] [address] Matt Bhatti  Dept: [phone]  Dept Fax: [phone]  Loc: [phone]    Yosef Jovel is a 58 y.o. female who presents today for:  Chief Complaint   Patient presents with    6 Month Follow-Up           HPI:     HPI    She is most concerned about elevated BP at recent visits with neurology and for infusions for MS. The systolic is reported by her to be from 574-514 and diastolic never higher then 85. Arthritis is controlled with OTC prn. Reviewed chart forpast medical history , surgical history , allergies, social history , family history and medications. Health Maintenance   Topic Date Due    Hepatitis C screen  Never done    HIV screen  Never done    Depression Screen  04/13/2022    Shingles Vaccine (1 of 2) 04/13/2022 (Originally 3/19/2010)    Breast cancer screen  09/15/2022    Potassium monitoring  02/24/2023    Creatinine monitoring  02/24/2023    Lipid screen  09/05/2024    Colorectal Cancer Screen  06/11/2025    DTaP/Tdap/Td vaccine (2 - Td or Tdap) 10/22/2031    Flu vaccine  Completed    COVID-19 Vaccine  Completed    Hepatitis A vaccine  Aged Out    Hepatitis B vaccine  Aged Out    Hib vaccine  Aged Out    Meningococcal (ACWY) vaccine  Aged Out    Pneumococcal 0-64 years Vaccine  Aged Out       Subjective:      Constitutional:Negative for fever, chills, diaphoresis, activity change, appetite change and fatigue. HENT: Negative for hearing loss, ear pain, congestion, sore throat, rhinorrhea, postnasal drip and ear discharge. Eyes: Negative for photophobia and visual disturbance. Respiratory: Negative for cough, chest tightness, shortness of breath and wheezing. Cardiovascular: Negative for chest pain and leg swelling. Gastrointestinal: Negative for nausea, vomiting, abdominal pain, diarrhea and constipation.    Genitourinary: Negative for dysuria, urgency and frequency. Neurological: Negative for weakness, light-headedness and headaches. Psychiatric/Behavioral: Negative for sleep disturbance.      :     Vitals:    04/13/22 1317   BP: 120/78   Site: Left Upper Arm   Position: Sitting   Cuff Size: Large Adult   Pulse: 95   Resp: 14   Temp: 97.8 °F (36.6 °C)   TempSrc: Temporal   SpO2: 98%   Weight: 169 lb 6.4 oz (76.8 kg)     Wt Readings from Last 3 Encounters:   04/13/22 169 lb 6.4 oz (76.8 kg)   04/08/22 165 lb (74.8 kg)   12/22/21 162 lb (73.5 kg)       Physical Exam  Physical Exam   Constitutional: Vital signs are normal. She appears well-developed and well-nourished. She is active. HENT:   Head: Normocephalic and atraumatic. Right Ear: Tympanic membrane, external ear and ear canal normal. No drainage or tenderness. Left Ear: Tympanic membrane, external ear and ear canal normal. No drainage or tenderness. Nose: Nose normal. No mucosal edema or rhinorrhea. Mouth/Throat: Uvula is midline, oropharynx is clear and moist and mucous membranes are normal. Mucous membranes are not pale. Normal dentition. No posterior oropharyngeal edema or posterior oropharyngeal erythema. Eyes: Lids are normal. Right eye exhibits no chemosis and no discharge. Left eye exhibits no chemosis and no drainage. Right conjunctiva has no hemorrhage. Left conjunctiva has no hemorrhage. Right eye exhibits normal extraocular motion. Left eye exhibits normal extraocular motion. Right pupil is round and reactive. Left pupil is round and reactive. Pupils are equal.   Cardiovascular: Normal rate, regular rhythm, S1 normal, S2 normal and normal heart sounds. Exam reveals no gallop. No murmur heard. Pulmonary/Chest: Effort normal and breath sounds normal. No respiratory distress. She has no wheezes. She has no rhonchi. She has no rales. Abdominal: Soft. Normal appearance and bowel sounds are normal. She exhibits no distension and no mass. There is no hepatosplenomegaly.  No tenderness. She has no rigidity, no rebound and no guarding. No hernia. Musculoskeletal:        Right lower leg: She exhibits no edema. Left lower leg: She exhibits no edema. Neurological: She is alert. Assessment/Plan   Feliciano Urrutia was seen today for 6 month follow-up. Diagnoses and all orders for this visit:    Essential hypertension    Multiple sclerosis ([facility] [address]    Primary osteoarthritis involving multiple joints    No change to medications   Continue healthy diet and exercise  Aspirin daily    Discussed use, benefit, and side effectsof prescribed medications. All patient questions answered. Pt voiced understanding. Reviewed health maintenance. Instructed to continue current medications, diet and exercise. Patient agreed with treatment plan. Followup as directed.      Electronically signed by Amber Medrano MD

## 2022-04-13 ENCOUNTER — OFFICE VISIT (OUTPATIENT)
Dept: FAMILY MEDICINE CLINIC | Age: 62
End: 2022-04-13
Payer: COMMERCIAL

## 2022-04-13 VITALS
DIASTOLIC BLOOD PRESSURE: 78 MMHG | HEART RATE: 95 BPM | WEIGHT: 169.4 LBS | BODY MASS INDEX: 30.01 KG/M2 | TEMPERATURE: 97.8 F | SYSTOLIC BLOOD PRESSURE: 120 MMHG | RESPIRATION RATE: 14 BRPM | OXYGEN SATURATION: 98 %

## 2022-04-13 DIAGNOSIS — I10 ESSENTIAL HYPERTENSION: Primary | ICD-10-CM

## 2022-04-13 DIAGNOSIS — G35 MULTIPLE SCLEROSIS (HCC): ICD-10-CM

## 2022-04-13 DIAGNOSIS — M15.9 PRIMARY OSTEOARTHRITIS INVOLVING MULTIPLE JOINTS: ICD-10-CM

## 2022-04-13 PROCEDURE — 99214 OFFICE O/P EST MOD 30 MIN: CPT | Performed by: FAMILY MEDICINE

## 2022-04-18 ENCOUNTER — NURSE ONLY (OUTPATIENT)
Dept: LAB | Age: 62
End: 2022-04-18

## 2022-04-18 ENCOUNTER — TELEPHONE (OUTPATIENT)
Dept: FAMILY MEDICINE CLINIC | Age: 62
End: 2022-04-18

## 2022-04-18 DIAGNOSIS — Z00.00 ROUTINE GENERAL MEDICAL EXAMINATION AT HEALTH CARE FACILITY: Primary | ICD-10-CM

## 2022-04-18 DIAGNOSIS — Z00.00 ROUTINE GENERAL MEDICAL EXAMINATION AT HEALTH CARE FACILITY: ICD-10-CM

## 2022-04-18 LAB
ERYTHROCYTE [DISTWIDTH] IN BLOOD BY AUTOMATED COUNT: 13.2 % (ref 11.5–14.5)
ERYTHROCYTE [DISTWIDTH] IN BLOOD BY AUTOMATED COUNT: 45.4 FL (ref 35–45)
HCT VFR BLD CALC: 40.1 % (ref 37–47)
HEMOGLOBIN: 12.7 GM/DL (ref 12–16)
MCH RBC QN AUTO: 29.7 PG (ref 26–33)
MCHC RBC AUTO-ENTMCNC: 31.7 GM/DL (ref 32.2–35.5)
MCV RBC AUTO: 93.9 FL (ref 81–99)
PLATELET # BLD: 270 THOU/MM3 (ref 130–400)
PMV BLD AUTO: 9.4 FL (ref 9.4–12.4)
RBC # BLD: 4.27 MILL/MM3 (ref 4.2–5.4)
WBC # BLD: 5.6 THOU/MM3 (ref 4.8–10.8)

## 2022-04-19 LAB
ALBUMIN SERPL-MCNC: 4.5 G/DL (ref 3.5–5.1)
ALP BLD-CCNC: 91 U/L (ref 38–126)
ALT SERPL-CCNC: 14 U/L (ref 11–66)
ANION GAP SERPL CALCULATED.3IONS-SCNC: 10 MEQ/L (ref 8–16)
AST SERPL-CCNC: 17 U/L (ref 5–40)
BILIRUB SERPL-MCNC: 0.4 MG/DL (ref 0.3–1.2)
BUN BLDV-MCNC: 10 MG/DL (ref 7–22)
CALCIUM SERPL-MCNC: 9.7 MG/DL (ref 8.5–10.5)
CHLORIDE BLD-SCNC: 103 MEQ/L (ref 98–111)
CHOLESTEROL, TOTAL: 178 MG/DL (ref 100–199)
CO2: 28 MEQ/L (ref 23–33)
CREAT SERPL-MCNC: 0.8 MG/DL (ref 0.4–1.2)
GFR SERPL CREATININE-BSD FRML MDRD: 73 ML/MIN/1.73M2
GLUCOSE FASTING: 79 MG/DL (ref 70–108)
HDLC SERPL-MCNC: 81 MG/DL
LDL CHOLESTEROL CALCULATED: 87 MG/DL
POTASSIUM SERPL-SCNC: 4.2 MEQ/L (ref 3.5–5.2)
SODIUM BLD-SCNC: 141 MEQ/L (ref 135–145)
TOTAL PROTEIN: 6.7 G/DL (ref 6.1–8)
TRIGL SERPL-MCNC: 51 MG/DL (ref 0–199)
TSH SERPL DL<=0.05 MIU/L-ACNC: 0.92 UIU/ML (ref 0.4–4.2)

## 2022-05-13 DIAGNOSIS — G35 MULTIPLE SCLEROSIS, RELAPSING-REMITTING (HCC): Primary | Chronic | ICD-10-CM

## 2022-05-13 RX ORDER — METHYLPREDNISOLONE SODIUM SUCCINATE 125 MG/2ML
100 INJECTION, POWDER, LYOPHILIZED, FOR SOLUTION INTRAMUSCULAR; INTRAVENOUS ONCE
Status: CANCELLED | OUTPATIENT
Start: 2022-05-13

## 2022-05-13 RX ORDER — SODIUM CHLORIDE 9 MG/ML
INJECTION, SOLUTION INTRAVENOUS CONTINUOUS
Status: CANCELLED | OUTPATIENT
Start: 2022-05-13

## 2022-05-13 RX ORDER — EPINEPHRINE 1 MG/ML
0.3 INJECTION, SOLUTION, CONCENTRATE INTRAVENOUS PRN
Status: CANCELLED | OUTPATIENT
Start: 2022-05-13

## 2022-05-13 RX ORDER — ACETAMINOPHEN 325 MG/1
650 TABLET ORAL
Status: CANCELLED | OUTPATIENT
Start: 2022-05-13

## 2022-05-13 RX ORDER — DIPHENHYDRAMINE HYDROCHLORIDE 50 MG/ML
50 INJECTION INTRAMUSCULAR; INTRAVENOUS
Status: CANCELLED | OUTPATIENT
Start: 2022-05-13

## 2022-05-13 RX ORDER — HEPARIN SODIUM (PORCINE) LOCK FLUSH IV SOLN 100 UNIT/ML 100 UNIT/ML
500 SOLUTION INTRAVENOUS PRN
Status: CANCELLED | OUTPATIENT
Start: 2022-05-13

## 2022-05-13 RX ORDER — FAMOTIDINE 10 MG/ML
20 INJECTION, SOLUTION INTRAVENOUS
Status: CANCELLED | OUTPATIENT
Start: 2022-05-13

## 2022-05-13 RX ORDER — ONDANSETRON 2 MG/ML
8 INJECTION INTRAMUSCULAR; INTRAVENOUS
Status: CANCELLED | OUTPATIENT
Start: 2022-05-13

## 2022-05-13 RX ORDER — DIPHENHYDRAMINE HYDROCHLORIDE 50 MG/ML
50 INJECTION INTRAMUSCULAR; INTRAVENOUS ONCE
Status: CANCELLED | OUTPATIENT
Start: 2022-05-13

## 2022-05-13 RX ORDER — SODIUM CHLORIDE 9 MG/ML
5-250 INJECTION, SOLUTION INTRAVENOUS PRN
Status: CANCELLED | OUTPATIENT
Start: 2022-05-13

## 2022-05-13 RX ORDER — ACETAMINOPHEN 325 MG/1
650 TABLET ORAL ONCE
Status: CANCELLED | OUTPATIENT
Start: 2022-05-13

## 2022-05-13 RX ORDER — SODIUM CHLORIDE 0.9 % (FLUSH) 0.9 %
5-40 SYRINGE (ML) INJECTION PRN
Status: CANCELLED | OUTPATIENT
Start: 2022-05-13

## 2022-05-13 RX ORDER — ALBUTEROL SULFATE 90 UG/1
4 AEROSOL, METERED RESPIRATORY (INHALATION) PRN
Status: CANCELLED | OUTPATIENT
Start: 2022-05-13

## 2022-05-23 ENCOUNTER — NURSE ONLY (OUTPATIENT)
Dept: LAB | Age: 62
End: 2022-05-23

## 2022-05-23 ENCOUNTER — TELEPHONE (OUTPATIENT)
Dept: NEUROLOGY | Age: 62
End: 2022-05-23

## 2022-05-23 DIAGNOSIS — G35 MULTIPLE SCLEROSIS, RELAPSING-REMITTING (HCC): ICD-10-CM

## 2022-05-23 DIAGNOSIS — G35 MULTIPLE SCLEROSIS (HCC): ICD-10-CM

## 2022-05-23 DIAGNOSIS — R26.0 ATAXIC GAIT: ICD-10-CM

## 2022-05-23 LAB
ALBUMIN SERPL-MCNC: 4.8 G/DL (ref 3.5–5.1)
ALP BLD-CCNC: 94 U/L (ref 38–126)
ALT SERPL-CCNC: 12 U/L (ref 11–66)
ANION GAP SERPL CALCULATED.3IONS-SCNC: 11 MEQ/L (ref 8–16)
AST SERPL-CCNC: 15 U/L (ref 5–40)
BASOPHILS # BLD: 0.8 %
BASOPHILS ABSOLUTE: 0 THOU/MM3 (ref 0–0.1)
BILIRUB SERPL-MCNC: 0.3 MG/DL (ref 0.3–1.2)
BILIRUBIN DIRECT: < 0.2 MG/DL (ref 0–0.3)
BUN BLDV-MCNC: 14 MG/DL (ref 7–22)
CALCIUM SERPL-MCNC: 9.7 MG/DL (ref 8.5–10.5)
CHLORIDE BLD-SCNC: 105 MEQ/L (ref 98–111)
CO2: 29 MEQ/L (ref 23–33)
CREAT SERPL-MCNC: 0.7 MG/DL (ref 0.4–1.2)
EOSINOPHIL # BLD: 4.2 %
EOSINOPHILS ABSOLUTE: 0.3 THOU/MM3 (ref 0–0.4)
ERYTHROCYTE [DISTWIDTH] IN BLOOD BY AUTOMATED COUNT: 13 % (ref 11.5–14.5)
ERYTHROCYTE [DISTWIDTH] IN BLOOD BY AUTOMATED COUNT: 45.3 FL (ref 35–45)
GFR SERPL CREATININE-BSD FRML MDRD: 85 ML/MIN/1.73M2
GLUCOSE BLD-MCNC: 96 MG/DL (ref 70–108)
HCT VFR BLD CALC: 42.1 % (ref 37–47)
HEMOGLOBIN: 13 GM/DL (ref 12–16)
IMMATURE GRANS (ABS): 0.01 THOU/MM3 (ref 0–0.07)
IMMATURE GRANULOCYTES: 0.2 %
LYMPHOCYTES # BLD: 31.7 %
LYMPHOCYTES ABSOLUTE: 2 THOU/MM3 (ref 1–4.8)
MCH RBC QN AUTO: 29.5 PG (ref 26–33)
MCHC RBC AUTO-ENTMCNC: 30.9 GM/DL (ref 32.2–35.5)
MCV RBC AUTO: 95.7 FL (ref 81–99)
MONOCYTES # BLD: 7.4 %
MONOCYTES ABSOLUTE: 0.5 THOU/MM3 (ref 0.4–1.3)
NUCLEATED RED BLOOD CELLS: 0 /100 WBC
PLATELET # BLD: 285 THOU/MM3 (ref 130–400)
PMV BLD AUTO: 9.8 FL (ref 9.4–12.4)
POTASSIUM SERPL-SCNC: 4.2 MEQ/L (ref 3.5–5.2)
RBC # BLD: 4.4 MILL/MM3 (ref 4.2–5.4)
SEG NEUTROPHILS: 55.7 %
SEGMENTED NEUTROPHILS ABSOLUTE COUNT: 3.5 THOU/MM3 (ref 1.8–7.7)
SODIUM BLD-SCNC: 145 MEQ/L (ref 135–145)
TOTAL PROTEIN: 6.8 G/DL (ref 6.1–8)
WBC # BLD: 6.2 THOU/MM3 (ref 4.8–10.8)

## 2022-05-31 DIAGNOSIS — G35 MULTIPLE SCLEROSIS (HCC): Primary | ICD-10-CM

## 2022-06-23 ENCOUNTER — HOSPITAL ENCOUNTER (OUTPATIENT)
Dept: MRI IMAGING | Age: 62
Discharge: HOME | End: 2022-06-23
Payer: COMMERCIAL

## 2022-06-23 DIAGNOSIS — G35 MULTIPLE SCLEROSIS (HCC): ICD-10-CM

## 2022-06-23 PROCEDURE — A9579 GAD-BASE MR CONTRAST NOS,1ML: HCPCS | Performed by: PSYCHIATRY & NEUROLOGY

## 2022-06-23 PROCEDURE — 6360000004 HC RX CONTRAST MEDICATION: Performed by: PSYCHIATRY & NEUROLOGY

## 2022-06-23 PROCEDURE — 70553 MRI BRAIN STEM W/O & W/DYE: CPT

## 2022-06-23 RX ADMIN — GADOTERIDOL 15 ML: 279.3 INJECTION, SOLUTION INTRAVENOUS at 17:31

## 2022-06-24 ENCOUNTER — TELEPHONE (OUTPATIENT)
Dept: FAMILY MEDICINE CLINIC | Age: 62
End: 2022-06-24

## 2022-06-24 ENCOUNTER — TELEPHONE (OUTPATIENT)
Dept: NEUROLOGY | Age: 62
End: 2022-06-24

## 2022-06-24 DIAGNOSIS — R42 VERTIGO: Primary | ICD-10-CM

## 2022-06-24 NOTE — TELEPHONE ENCOUNTER
----- Message from Steven Uribe MD sent at 6/24/2022 11:01 AM EDT -----  Please let patient know MRI Brain is stable compared to prior study.   Steven Uribe MD 11:01 AM

## 2022-07-06 ENCOUNTER — HOSPITAL ENCOUNTER (OUTPATIENT)
Dept: NURSING | Age: 62
Discharge: HOME | End: 2022-07-06
Payer: COMMERCIAL

## 2022-07-06 VITALS
TEMPERATURE: 96.7 F | HEIGHT: 63 IN | HEART RATE: 86 BPM | SYSTOLIC BLOOD PRESSURE: 142 MMHG | WEIGHT: 165.6 LBS | BODY MASS INDEX: 29.34 KG/M2 | RESPIRATION RATE: 18 BRPM | DIASTOLIC BLOOD PRESSURE: 81 MMHG | OXYGEN SATURATION: 99 %

## 2022-07-06 DIAGNOSIS — G35 MULTIPLE SCLEROSIS, RELAPSING-REMITTING (HCC): Primary | ICD-10-CM

## 2022-07-06 LAB
ALBUMIN SERPL-MCNC: 4.6 G/DL (ref 3.5–5.1)
ALP BLD-CCNC: 108 U/L (ref 38–126)
ALT SERPL-CCNC: 12 U/L (ref 11–66)
ANION GAP SERPL CALCULATED.3IONS-SCNC: 10 MEQ/L (ref 8–16)
AST SERPL-CCNC: 15 U/L (ref 5–40)
BASOPHILS # BLD: 0.5 %
BASOPHILS ABSOLUTE: 0 THOU/MM3 (ref 0–0.1)
BILIRUB SERPL-MCNC: 0.3 MG/DL (ref 0.3–1.2)
BUN BLDV-MCNC: 15 MG/DL (ref 7–22)
CALCIUM SERPL-MCNC: 10.3 MG/DL (ref 8.5–10.5)
CHLORIDE BLD-SCNC: 105 MEQ/L (ref 98–111)
CO2: 29 MEQ/L (ref 23–33)
CREAT SERPL-MCNC: 0.9 MG/DL (ref 0.4–1.2)
EOSINOPHIL # BLD: 3.3 %
EOSINOPHILS ABSOLUTE: 0.2 THOU/MM3 (ref 0–0.4)
ERYTHROCYTE [DISTWIDTH] IN BLOOD BY AUTOMATED COUNT: 12.8 % (ref 11.5–14.5)
ERYTHROCYTE [DISTWIDTH] IN BLOOD BY AUTOMATED COUNT: 43 FL (ref 35–45)
GFR SERPL CREATININE-BSD FRML MDRD: 63 ML/MIN/1.73M2
GLUCOSE BLD-MCNC: 82 MG/DL (ref 70–108)
HCT VFR BLD CALC: 42.1 % (ref 37–47)
HEMOGLOBIN: 13.5 GM/DL (ref 12–16)
IMMATURE GRANS (ABS): 0.02 THOU/MM3 (ref 0–0.07)
IMMATURE GRANULOCYTES: 0.3 %
LYMPHOCYTES # BLD: 36.8 %
LYMPHOCYTES ABSOLUTE: 2.7 THOU/MM3 (ref 1–4.8)
MCH RBC QN AUTO: 29.5 PG (ref 26–33)
MCHC RBC AUTO-ENTMCNC: 32.1 GM/DL (ref 32.2–35.5)
MCV RBC AUTO: 92.1 FL (ref 81–99)
MONOCYTES # BLD: 7.1 %
MONOCYTES ABSOLUTE: 0.5 THOU/MM3 (ref 0.4–1.3)
NUCLEATED RED BLOOD CELLS: 0 /100 WBC
PLATELET # BLD: 272 THOU/MM3 (ref 130–400)
PMV BLD AUTO: 9.1 FL (ref 9.4–12.4)
POTASSIUM SERPL-SCNC: 3.8 MEQ/L (ref 3.5–5.2)
RBC # BLD: 4.57 MILL/MM3 (ref 4.2–5.4)
SEG NEUTROPHILS: 52 %
SEGMENTED NEUTROPHILS ABSOLUTE COUNT: 3.8 THOU/MM3 (ref 1.8–7.7)
SODIUM BLD-SCNC: 144 MEQ/L (ref 135–145)
TOTAL PROTEIN: 7 G/DL (ref 6.1–8)
WBC # BLD: 7.3 THOU/MM3 (ref 4.8–10.8)

## 2022-07-06 PROCEDURE — 85025 COMPLETE CBC W/AUTO DIFF WBC: CPT

## 2022-07-06 PROCEDURE — 80053 COMPREHEN METABOLIC PANEL: CPT

## 2022-07-06 PROCEDURE — 6360000002 HC RX W HCPCS: Performed by: NURSE PRACTITIONER

## 2022-07-06 PROCEDURE — 6370000000 HC RX 637 (ALT 250 FOR IP): Performed by: NURSE PRACTITIONER

## 2022-07-06 PROCEDURE — 96366 THER/PROPH/DIAG IV INF ADDON: CPT

## 2022-07-06 PROCEDURE — 2580000003 HC RX 258: Performed by: NURSE PRACTITIONER

## 2022-07-06 PROCEDURE — 96365 THER/PROPH/DIAG IV INF INIT: CPT

## 2022-07-06 RX ORDER — ACETAMINOPHEN 325 MG/1
650 TABLET ORAL
OUTPATIENT
Start: 2022-11-21

## 2022-07-06 RX ORDER — ACETAMINOPHEN 325 MG/1
650 TABLET ORAL ONCE
Status: COMPLETED | OUTPATIENT
Start: 2022-07-06 | End: 2022-07-06

## 2022-07-06 RX ORDER — ONDANSETRON 2 MG/ML
8 INJECTION INTRAMUSCULAR; INTRAVENOUS
OUTPATIENT
Start: 2022-11-21

## 2022-07-06 RX ORDER — SODIUM CHLORIDE 9 MG/ML
5-250 INJECTION, SOLUTION INTRAVENOUS PRN
OUTPATIENT
Start: 2022-11-21

## 2022-07-06 RX ORDER — SODIUM CHLORIDE 9 MG/ML
INJECTION, SOLUTION INTRAVENOUS CONTINUOUS
OUTPATIENT
Start: 2022-11-21

## 2022-07-06 RX ORDER — METHYLPREDNISOLONE SODIUM SUCCINATE 125 MG/2ML
100 INJECTION, POWDER, LYOPHILIZED, FOR SOLUTION INTRAMUSCULAR; INTRAVENOUS ONCE
Status: COMPLETED | OUTPATIENT
Start: 2022-07-06 | End: 2022-07-06

## 2022-07-06 RX ORDER — ACETAMINOPHEN 325 MG/1
650 TABLET ORAL ONCE
OUTPATIENT
Start: 2022-11-21

## 2022-07-06 RX ORDER — METHYLPREDNISOLONE SODIUM SUCCINATE 125 MG/2ML
100 INJECTION, POWDER, LYOPHILIZED, FOR SOLUTION INTRAMUSCULAR; INTRAVENOUS ONCE
OUTPATIENT
Start: 2022-11-21

## 2022-07-06 RX ORDER — DIPHENHYDRAMINE HYDROCHLORIDE 50 MG/ML
50 INJECTION INTRAMUSCULAR; INTRAVENOUS ONCE
Status: COMPLETED | OUTPATIENT
Start: 2022-07-06 | End: 2022-07-06

## 2022-07-06 RX ORDER — SODIUM CHLORIDE 9 MG/ML
5-250 INJECTION, SOLUTION INTRAVENOUS PRN
Status: DISCONTINUED | OUTPATIENT
Start: 2022-07-06 | End: 2022-07-07 | Stop reason: HOSPADM

## 2022-07-06 RX ORDER — ALBUTEROL SULFATE 90 UG/1
4 AEROSOL, METERED RESPIRATORY (INHALATION) PRN
OUTPATIENT
Start: 2022-11-21

## 2022-07-06 RX ORDER — HEPARIN SODIUM (PORCINE) LOCK FLUSH IV SOLN 100 UNIT/ML 100 UNIT/ML
500 SOLUTION INTRAVENOUS PRN
OUTPATIENT
Start: 2022-11-21

## 2022-07-06 RX ORDER — SODIUM CHLORIDE 0.9 % (FLUSH) 0.9 %
5-40 SYRINGE (ML) INJECTION PRN
OUTPATIENT
Start: 2022-11-21

## 2022-07-06 RX ORDER — DIPHENHYDRAMINE HYDROCHLORIDE 50 MG/ML
50 INJECTION INTRAMUSCULAR; INTRAVENOUS ONCE
OUTPATIENT
Start: 2022-11-21

## 2022-07-06 RX ORDER — DIPHENHYDRAMINE HYDROCHLORIDE 50 MG/ML
50 INJECTION INTRAMUSCULAR; INTRAVENOUS
OUTPATIENT
Start: 2022-11-21

## 2022-07-06 RX ADMIN — OCRELIZUMAB 600 MG: 300 INJECTION INTRAVENOUS at 09:20

## 2022-07-06 RX ADMIN — DIPHENHYDRAMINE HYDROCHLORIDE 50 MG: 50 INJECTION INTRAMUSCULAR; INTRAVENOUS at 08:47

## 2022-07-06 RX ADMIN — ACETAMINOPHEN 650 MG: 325 TABLET ORAL at 08:48

## 2022-07-06 RX ADMIN — SODIUM CHLORIDE 20 ML/HR: 9 INJECTION, SOLUTION INTRAVENOUS at 08:46

## 2022-07-06 RX ADMIN — METHYLPREDNISOLONE SODIUM SUCCINATE 100 MG: 125 INJECTION, POWDER, FOR SOLUTION INTRAMUSCULAR; INTRAVENOUS at 08:47

## 2022-07-06 ASSESSMENT — PAIN - FUNCTIONAL ASSESSMENT: PAIN_FUNCTIONAL_ASSESSMENT: NONE - DENIES PAIN

## 2022-07-06 NOTE — PROGRESS NOTES
0477 Patient arrived to [facility] ambulatory  for ocrevus infusion. Oriented to room and call light  PT RIGHTS AND RESPONSIBILITIES OFFERED TO PT. She denies recent infection or antibiotic use. Blood work obtained and sent to lab    7991 Premedications given and she denies complaints    0920 Medication started and she denies complaints    0950 Medication infusing and she denies complaints    1020 Medication infusing and she denies complaints    1050 Medication infusing and she denies complaints    1120 Medication infusing and she denies complaints    1230 Patient denies complaints at this time    1309 Medication completed and she denies complaints    1409 Patient denies complaints. Iv removed. Discharge instructions given and explained and she denies questions.   Discharged ambulatory      _M___ Safety:       (Environmental)  Telma [place] to environment   Ensure ID band is correct and in place/ allergy band as needed   Assess for fall risk   Initiate fall precautions as applicable (fall band, side rails, etc.)   Call light within reach   Bed in low position/ wheels locked    _M___ Pain:        Assess pain level and characteristics   Administer analgesics as ordered   Assess effectiveness of pain management and report to MD as needed    _M___ Knowledge Deficit:   Assess baseline knowledge   Provide teaching at level of understanding   Provide teaching via preferred learning method   Evaluate teaching effectiveness    _M___ Hemodynamic/Respiratory Status:       (Pre and Post Procedure Monitoring)   Assess/Monitor vital signs and LOC   Assess Baseline SpO2 prior to any sedation   Obtain weight/height   Assess vital signs/ LOC until patient meets discharge criteria   Monitor procedure site and notify MD of any issues

## 2022-07-26 DIAGNOSIS — G35 MULTIPLE SCLEROSIS (HCC): Primary | ICD-10-CM

## 2022-07-26 RX ORDER — AMANTADINE HYDROCHLORIDE 100 MG/1
100 CAPSULE, GELATIN COATED ORAL 2 TIMES DAILY
Qty: 60 CAPSULE | Refills: 5 | Status: SHIPPED | OUTPATIENT
Start: 2022-07-26 | End: 2022-10-07 | Stop reason: SDUPTHER

## 2022-09-06 ENCOUNTER — NURSE ONLY (OUTPATIENT)
Dept: FAMILY MEDICINE CLINIC | Age: 62
End: 2022-09-06
Payer: COMMERCIAL

## 2022-09-06 ENCOUNTER — TELEPHONE (OUTPATIENT)
Dept: FAMILY MEDICINE CLINIC | Age: 62
End: 2022-09-06

## 2022-09-06 DIAGNOSIS — L23.7 POISON IVY: Primary | ICD-10-CM

## 2022-09-06 PROCEDURE — 96372 THER/PROPH/DIAG INJ SC/IM: CPT | Performed by: FAMILY MEDICINE

## 2022-09-06 RX ORDER — TRIAMCINOLONE ACETONIDE 40 MG/ML
40 INJECTION, SUSPENSION INTRA-ARTICULAR; INTRAMUSCULAR ONCE
Status: COMPLETED | OUTPATIENT
Start: 2022-09-06 | End: 2022-09-06

## 2022-09-06 RX ADMIN — TRIAMCINOLONE ACETONIDE 40 MG: 40 INJECTION, SUSPENSION INTRA-ARTICULAR; INTRAMUSCULAR at 14:04

## 2022-09-06 NOTE — PROGRESS NOTES
Administrations This Visit       triamcinolone acetonide (KENALOG-40) injection 40 mg       Admin Date  09/06/2022  14:04 Action  Given Dose  40 mg Route  IntraMUSCular Site  Ventrogluteal Right Administered By  Deana Colón LPN    Ordering Provider: Navid Pemberton MD    NDC: 9415-7233-24    Lot#: [id number]    : [employer] (PRIMARY CARE)    Patient Supplied?: No                    Patient instructed to remain in clinic for 20 minutes after injection and was advised to report any adverse reaction to me immediately.

## 2022-09-06 NOTE — TELEPHONE ENCOUNTER
Has poison ivy on left shoulder and right arm and hand. She would like a shot as her granddaughter is getting  this weekend.

## 2022-09-09 DIAGNOSIS — G35 MULTIPLE SCLEROSIS (HCC): ICD-10-CM

## 2022-09-09 RX ORDER — GABAPENTIN 300 MG/1
300 CAPSULE ORAL DAILY
Qty: 90 CAPSULE | Refills: 1 | Status: SHIPPED | OUTPATIENT
Start: 2022-09-09 | End: 2023-01-10

## 2022-09-21 DIAGNOSIS — I10 ESSENTIAL HYPERTENSION: ICD-10-CM

## 2022-09-21 RX ORDER — LOSARTAN POTASSIUM AND HYDROCHLOROTHIAZIDE 12.5; 5 MG/1; MG/1
TABLET ORAL
Qty: 90 TABLET | Refills: 0 | OUTPATIENT
Start: 2022-09-21

## 2022-09-21 RX ORDER — LOSARTAN POTASSIUM AND HYDROCHLOROTHIAZIDE 12.5; 5 MG/1; MG/1
1 TABLET ORAL DAILY
Qty: 90 TABLET | Refills: 1 | OUTPATIENT
Start: 2022-09-21

## 2022-09-26 ENCOUNTER — TELEPHONE (OUTPATIENT)
Dept: FAMILY MEDICINE CLINIC | Age: 62
End: 2022-09-26

## 2022-10-05 ENCOUNTER — NURSE ONLY (OUTPATIENT)
Dept: LAB | Age: 62
End: 2022-10-05

## 2022-10-05 DIAGNOSIS — G35 MULTIPLE SCLEROSIS, RELAPSING-REMITTING (HCC): ICD-10-CM

## 2022-10-05 DIAGNOSIS — G35 MULTIPLE SCLEROSIS (HCC): Primary | ICD-10-CM

## 2022-10-05 LAB
BASOPHILS # BLD: 0.7 %
BASOPHILS ABSOLUTE: 0.1 THOU/MM3 (ref 0–0.1)
EOSINOPHIL # BLD: 3.1 %
EOSINOPHILS ABSOLUTE: 0.3 THOU/MM3 (ref 0–0.4)
ERYTHROCYTE [DISTWIDTH] IN BLOOD BY AUTOMATED COUNT: 13 % (ref 11.5–14.5)
ERYTHROCYTE [DISTWIDTH] IN BLOOD BY AUTOMATED COUNT: 45.4 FL (ref 35–45)
HCT VFR BLD CALC: 42.8 % (ref 37–47)
HEMOGLOBIN: 13.7 GM/DL (ref 12–16)
IMMATURE GRANS (ABS): 0.01 THOU/MM3 (ref 0–0.07)
IMMATURE GRANULOCYTES: 0.1 %
LYMPHOCYTES # BLD: 32.4 %
LYMPHOCYTES ABSOLUTE: 2.7 THOU/MM3 (ref 1–4.8)
MCH RBC QN AUTO: 30.6 PG (ref 26–33)
MCHC RBC AUTO-ENTMCNC: 32 GM/DL (ref 32.2–35.5)
MCV RBC AUTO: 95.5 FL (ref 81–99)
MONOCYTES # BLD: 8.7 %
MONOCYTES ABSOLUTE: 0.7 THOU/MM3 (ref 0.4–1.3)
NUCLEATED RED BLOOD CELLS: 0 /100 WBC
PLATELET # BLD: 351 THOU/MM3 (ref 130–400)
PMV BLD AUTO: 9.1 FL (ref 9.4–12.4)
RBC # BLD: 4.48 MILL/MM3 (ref 4.2–5.4)
SEG NEUTROPHILS: 55 %
SEGMENTED NEUTROPHILS ABSOLUTE COUNT: 4.6 THOU/MM3 (ref 1.8–7.7)
WBC # BLD: 8.4 THOU/MM3 (ref 4.8–10.8)

## 2022-10-06 LAB
ALBUMIN SERPL-MCNC: 4 G/DL (ref 3.5–5.1)
ALP BLD-CCNC: 106 U/L (ref 38–126)
ALT SERPL-CCNC: 16 U/L (ref 11–66)
ANION GAP SERPL CALCULATED.3IONS-SCNC: 14 MEQ/L (ref 8–16)
AST SERPL-CCNC: 16 U/L (ref 5–40)
BILIRUB SERPL-MCNC: 0.3 MG/DL (ref 0.3–1.2)
BUN BLDV-MCNC: 15 MG/DL (ref 7–22)
CALCIUM SERPL-MCNC: 10.3 MG/DL (ref 8.5–10.5)
CHLORIDE BLD-SCNC: 102 MEQ/L (ref 98–111)
CO2: 28 MEQ/L (ref 23–33)
CREAT SERPL-MCNC: 0.8 MG/DL (ref 0.4–1.2)
GFR SERPL CREATININE-BSD FRML MDRD: 73 ML/MIN/1.73M2
GLUCOSE BLD-MCNC: 63 MG/DL (ref 70–108)
POTASSIUM SERPL-SCNC: 4.5 MEQ/L (ref 3.5–5.2)
SODIUM BLD-SCNC: 144 MEQ/L (ref 135–145)
TOTAL PROTEIN: 6.7 G/DL (ref 6.1–8)

## 2022-10-06 RX ORDER — BACLOFEN 10 MG/1
TABLET ORAL
Qty: 270 TABLET | Refills: 2 | Status: SHIPPED | OUTPATIENT
Start: 2022-10-06

## 2022-10-07 ENCOUNTER — OFFICE VISIT (OUTPATIENT)
Dept: NEUROLOGY | Age: 62
End: 2022-10-07
Payer: COMMERCIAL

## 2022-10-07 VITALS
DIASTOLIC BLOOD PRESSURE: 90 MMHG | WEIGHT: 164 LBS | SYSTOLIC BLOOD PRESSURE: 132 MMHG | BODY MASS INDEX: 29.06 KG/M2 | HEART RATE: 83 BPM | HEIGHT: 63 IN | OXYGEN SATURATION: 98 %

## 2022-10-07 DIAGNOSIS — G35 MULTIPLE SCLEROSIS (HCC): Primary | ICD-10-CM

## 2022-10-07 PROCEDURE — 99213 OFFICE O/P EST LOW 20 MIN: CPT | Performed by: NURSE PRACTITIONER

## 2022-10-07 RX ORDER — AMANTADINE HYDROCHLORIDE 100 MG/1
100 CAPSULE, GELATIN COATED ORAL 2 TIMES DAILY
Qty: 60 CAPSULE | Refills: 5 | Status: SHIPPED | OUTPATIENT
Start: 2022-10-07

## 2022-10-07 NOTE — PATIENT INSTRUCTIONS
Continue Ocrevus infusions as planned   CBC, hepatic panel 4/2023  Continue with Amantadine  Take calcium and vitamin D supplements  Stay physically active  Report any new symptoms  Follow up in 6 months or sooner if needed. Call if any questions or concerns.

## 2022-10-07 NOTE — PROGRESS NOTES
NEUROLOGY OUT PATIENT FOLLOW UP NOTE:  10/7/040507:34 AM    Alison Braden is here for follow up for multiple sclerosis. Patient Active Problem List   Diagnosis    Multiple sclerosis, relapsing-remitting (HCC)           ROS:  Respiratory : no cough, no shortness of breath  Cardiac: no chest pain. No palpitations. Renal : no flank pain, no hematuria    Skin: no rash      No Known Allergies    Current Outpatient Medications:     baclofen (LIORESAL) 10 MG tablet, TAKE 1 TABLET BY MOUTH THREE TIMES DAILY, Disp: 270 tablet, Rfl: 2    gabapentin (NEURONTIN) 300 MG capsule, Take 1 capsule by mouth daily for 123 days. , Disp: 90 capsule, Rfl: 1    amantadine (SYMMETREL) 100 MG capsule, Take 1 capsule by mouth in the morning and 1 capsule before bedtime. , Disp: 60 capsule, Rfl: 5    losartan-hydroCHLOROthiazide (HYZAAR) 50-12.5 MG per tablet, Take 1 tablet by mouth daily, Disp: 90 tablet, Rfl: 1    cyclobenzaprine (FLEXERIL) 10 MG tablet, Take 1 tablet by mouth nightly, Disp: 90 tablet, Rfl: 3    Ocrelizumab (OCREVUS IV), Infuse 300 mg intravenously, Disp: , Rfl:     zinc 50 MG CAPS, Take 1 capsule by mouth daily, Disp: , Rfl:     Ascorbic Acid (VITAMIN C ER PO), Take by mouth, Disp: , Rfl:     Handicap Placard [facility], Exp. 2025, Disp: 1 each, Rfl: 0    acetaminophen (TYLENOL) 500 MG tablet, Take 1,000 mg by mouth every 4 hours as needed , Disp: , Rfl:     therapeutic multivitamin-minerals (THERAGRAN-M) tablet, Take 1 tablet by mouth daily. , Disp: , Rfl:     Cholecalciferol (VITAMIN D-3 PO), Take  by mouth 2 times daily. , Disp: , Rfl:     calcium carbonate-vitamin D 600-200 MG-UNIT TABS, Take  by mouth 2 times daily. , Disp: , Rfl:     I reviewed the past medical history, allergies, medications, social history and family history.        PE:   Vitals:    10/07/22 1129   BP: (!) 132/90   Site: Left Upper Arm   Position: Sitting   Cuff Size: Medium Adult   Pulse: 83   SpO2: 98%   Weight: 164 lb (74.4 kg)   Height: 5' 3\" (1.6 m) neral Appearance:  awake, alert, oriented, in no acute distress, she is wearing mask. Gen: NAD, Language is Intact. Skin: no rash, lesion, dry  to touch. warm  Head: no rash, no icterus  Neck: There is no carotid bruits. The Neck is supple. There is no neck lymphadenopathy. Neuro: CN 2-12: There is limited abduction of the left eye, which with reported blurred vision and left lateral gaze. Power 5/5 in right upper and right lower extremity. Mild left hemiparesis 4/5. Reflexes are  symmetric. Long tracts are intact. Cerebellar exam is Intact. Sensory exam is intact to light touch. Gait is  intact,  Musculoskeletal:  Has no hand arthritis, no limitation of ROM in any of the four extremities.   Lower extremities no edema          DATA:      Results for orders placed or performed in visit on 10/05/22   Comprehensive metabolic panel   Result Value Ref Range    Glucose 63 (L) 70 - 108 mg/dL    Creatinine 0.8 0.4 - 1.2 mg/dL    BUN 15 7 - 22 mg/dL    Sodium 144 135 - 145 meq/L    Potassium 4.5 3.5 - 5.2 meq/L    Chloride 102 98 - 111 meq/L    CO2 28 23 - 33 meq/L    Calcium 10.3 8.5 - 10.5 mg/dL    AST 16 5 - 40 U/L    Alkaline Phosphatase 106 38 - 126 U/L    Total Protein 6.7 6.1 - 8.0 g/dL    Albumin 4.0 3.5 - 5.1 g/dL    Total Bilirubin 0.3 0.3 - 1.2 mg/dL    ALT 16 11 - 66 U/L   CBC With Auto Differential   Result Value Ref Range    WBC 8.4 4.8 - 10.8 thou/mm3    RBC 4.48 4.20 - 5.40 mill/mm3    Hemoglobin 13.7 12.0 - 16.0 gm/dl    Hematocrit 42.8 37.0 - 47.0 %    MCV 95.5 81.0 - 99.0 fL    MCH 30.6 26.0 - 33.0 pg    MCHC 32.0 (L) 32.2 - 35.5 gm/dl    RDW-CV 13.0 11.5 - 14.5 %    RDW-SD 45.4 (H) 35.0 - 45.0 fL    Platelets 240 750 - 162 thou/mm3    MPV 9.1 (L) 9.4 - 12.4 fL    Seg Neutrophils 55.0 %    Lymphocytes 32.4 %    Monocytes 8.7 %    Eosinophils 3.1 %    Basophils 0.7 %    Immature Granulocytes 0.1 %    Segs Absolute 4.6 1.8 - 7.7 thou/mm3    Lymphocytes Absolute 2.7 1.0 - 4.8 thou/mm3    Monocytes Absolute 0.7 0.4 - 1.3 thou/mm3    Eosinophils Absolute 0.3 0.0 - 0.4 thou/mm3    Basophils Absolute 0.1 0.0 - 0.1 thou/mm3    Immature Grans (Abs) 0.01 0.00 - 0.07 thou/mm3    nRBC 0 /100 wbc   Anion Gap   Result Value Ref Range    Anion Gap 14.0 8.0 - 16.0 meq/L   Glomerular Filtration Rate, Estimated   Result Value Ref Range    Est, Glom Filt Rate 73 (A) ml/min/1.73m2        Results for orders placed during the hospital encounter of 06/23/22    MRI BRAIN W WO CONTRAST    Narrative  PROCEDURE: MRI BRAIN W WO CONTRAST    CLINICAL INFORMATIONMultiple sclerosis ([facility] [address] Follow up. On Ocrevus. COMPARISON: 10/20/2021. TECHNIQUE: Multiplanar and multiple spin echo T1 and T2-weighted images were obtained through the brain before and after the administration of intravenous contrast. High resolution sagittal FLAIR images were also obtained. FINDINGS:    Current lesion burden of white matter: Very mild. Interval since the most recent exam: 9 months. Interval new lesion development: None. Number of pathologically enhancing lesions of the white matter: None. The diffusion-weighted images are normal. The brain volume is normal.There are no intra-or extra-axial collections. There is no hydrocephalus, midline shift or mass effect. On the FLAIR and T2-weighted sequences, there is stable faint signal hyperintensity in the left dorsal juan carlos and around the fourth ventricle. There is minimal abnormal signal scattered in the white matter of the brain. No new white matter lesions are  present. On the gradient echo T2-weighted images, there is mineralization in the medial aspects of the basal ganglia. No other areas of susceptibility artifact are present. There is no abnormal enhancement in the brain. The major intracranial vascular flow voids are present. The midline craniocervical junction structures are normal.  The brainstem and pituitary gland are normal.    Impression  1.  Minimal amount of abnormal signal in the white matter the brain. This is stable. 2. No new signal. No evidence of active demyelination. **This report has been created using voice recognition software. It may contain minor errors which are inherent in voice recognition technology. **        Final report electronically signed by Dr. Chintan Love on 6/24/2022 10:07 AM             Assessment:     Diagnosis Orders   1. Multiple sclerosis (HCC)  amantadine (SYMMETREL) 100 MG capsule    CBC with Auto Differential    Hepatic Function Panel           Patient is seen as follow-up for multiple sclerosis. She is doing very well. She denies any new numbness, weakness. Her balance is good. She is on Ocrevus and is tolerating the medication well, her next infusion in Jan 2023. She is also on baclofen and amantadine. I shared with the patient and her  that her MRI brain done W/WO contrast 6/2022 was stable. She is pleased with how she is doing. After detailed discussion with patient and her  we agreed on the following plan. Plan:   Continue Ocrevus infusions as planned   CBC, hepatic panel 4/2023  Continue with Amantadine  Take calcium and vitamin D supplements  Stay physically active  Report any new symptoms  Follow up in 6 months or sooner if needed. Call if any questions or concerns.     Total time 26 min    KYLER Devi - MACY

## 2022-10-19 ENCOUNTER — HOSPITAL ENCOUNTER (OUTPATIENT)
Dept: MAMMOGRAPHY | Age: 62
Discharge: HOME | End: 2022-10-19
Payer: COMMERCIAL

## 2022-10-19 ENCOUNTER — OFFICE VISIT (OUTPATIENT)
Dept: FAMILY MEDICINE CLINIC | Age: 62
End: 2022-10-19
Payer: COMMERCIAL

## 2022-10-19 VITALS
OXYGEN SATURATION: 97 % | SYSTOLIC BLOOD PRESSURE: 140 MMHG | DIASTOLIC BLOOD PRESSURE: 90 MMHG | BODY MASS INDEX: 29.77 KG/M2 | WEIGHT: 168 LBS | HEART RATE: 90 BPM | TEMPERATURE: 97.6 F | HEIGHT: 63 IN | RESPIRATION RATE: 17 BRPM

## 2022-10-19 DIAGNOSIS — R42 VERTIGO: ICD-10-CM

## 2022-10-19 DIAGNOSIS — Z00.00 ROUTINE GENERAL MEDICAL EXAMINATION AT A HEALTH CARE FACILITY: Primary | ICD-10-CM

## 2022-10-19 DIAGNOSIS — B96.89 ACUTE BACTERIAL SINUSITIS: ICD-10-CM

## 2022-10-19 DIAGNOSIS — J01.90 ACUTE BACTERIAL SINUSITIS: ICD-10-CM

## 2022-10-19 DIAGNOSIS — I10 ESSENTIAL HYPERTENSION: ICD-10-CM

## 2022-10-19 DIAGNOSIS — Z12.39 BREAST SCREENING: ICD-10-CM

## 2022-10-19 DIAGNOSIS — G35 MULTIPLE SCLEROSIS (HCC): ICD-10-CM

## 2022-10-19 DIAGNOSIS — M15.9 PRIMARY OSTEOARTHRITIS INVOLVING MULTIPLE JOINTS: ICD-10-CM

## 2022-10-19 PROCEDURE — 77063 BREAST TOMOSYNTHESIS BI: CPT

## 2022-10-19 PROCEDURE — 99396 PREV VISIT EST AGE 40-64: CPT | Performed by: FAMILY MEDICINE

## 2022-10-19 RX ORDER — METHYLPREDNISOLONE 4 MG/1
TABLET ORAL
Qty: 1 KIT | Refills: 0 | Status: SHIPPED | OUTPATIENT
Start: 2022-10-19 | End: 2022-10-25

## 2022-10-19 RX ORDER — LOSARTAN POTASSIUM AND HYDROCHLOROTHIAZIDE 12.5; 5 MG/1; MG/1
1 TABLET ORAL DAILY
Qty: 90 TABLET | Refills: 1 | Status: SHIPPED | OUTPATIENT
Start: 2022-10-19

## 2022-10-19 RX ORDER — CEFDINIR 300 MG/1
300 CAPSULE ORAL 2 TIMES DAILY
Qty: 20 CAPSULE | Refills: 0 | Status: SHIPPED | OUTPATIENT
Start: 2022-10-19 | End: 2022-10-29

## 2022-10-19 RX ORDER — FLUTICASONE PROPIONATE 50 MCG
2 SPRAY, SUSPENSION (ML) NASAL DAILY
Qty: 48 G | Refills: 1 | Status: SHIPPED | OUTPATIENT
Start: 2022-10-19

## 2022-10-19 SDOH — ECONOMIC STABILITY: FOOD INSECURITY: WITHIN THE PAST 12 MONTHS, THE FOOD YOU BOUGHT JUST DIDN'T LAST AND YOU DIDN'T HAVE MONEY TO GET MORE.: NEVER TRUE

## 2022-10-19 SDOH — ECONOMIC STABILITY: FOOD INSECURITY: WITHIN THE PAST 12 MONTHS, YOU WORRIED THAT YOUR FOOD WOULD RUN OUT BEFORE YOU GOT MONEY TO BUY MORE.: NEVER TRUE

## 2022-10-19 ASSESSMENT — PATIENT HEALTH QUESTIONNAIRE - PHQ9
2. FEELING DOWN, DEPRESSED OR HOPELESS: 0
SUM OF ALL RESPONSES TO PHQ QUESTIONS 1-9: 0
1. LITTLE INTEREST OR PLEASURE IN DOING THINGS: 0
SUM OF ALL RESPONSES TO PHQ9 QUESTIONS 1 & 2: 0

## 2022-10-19 ASSESSMENT — SOCIAL DETERMINANTS OF HEALTH (SDOH): HOW HARD IS IT FOR YOU TO PAY FOR THE VERY BASICS LIKE FOOD, HOUSING, MEDICAL CARE, AND HEATING?: NOT HARD AT ALL

## 2022-10-19 NOTE — PROGRESS NOTES
[address] [place] [id number]  Dept: [phone]  Dept Fax: [phone]  Loc:  Tobin Rivers is a 58 y.o. female who presents today for:  Chief Complaint   Patient presents with    6 Month Follow-Up           HPI:     HPI    Here for yearly checkup. Seeing neurology for infusions for MS. Hypertension under treatment with medications    Arthritis is controlled with OTC prn. Also having nasal congestion for 11 days. laryngitis, mild cough, but no fever and normal appetite. Taking mucinex PM with temporary relief. Reviewed chart forpast medical history , surgical history , allergies, social history , family history and medications. Health Maintenance   Topic Date Due    HIV screen  Never done    Hepatitis C screen  Never done    Depression Screen  04/13/2022    Breast cancer screen  09/15/2022    Shingles vaccine (1 of 2) 10/19/2023 (Originally 3/19/2010)    Colorectal Cancer Screen  06/11/2025    Lipids  04/18/2027    DTaP/Tdap/Td vaccine (2 - Td or Tdap) 10/22/2031    Flu vaccine  Completed    COVID-19 Vaccine  Completed    Hepatitis A vaccine  Aged Out    Hib vaccine  Aged Out    Meningococcal (ACWY) vaccine  Aged Out    Pneumococcal 0-64 years Vaccine  Aged Out       Subjective:      Constitutional:Negative for fever, chills, diaphoresis, activity change, appetite change and fatigue. HENT: Negative for hearing loss, ear pain, congestion, sore throat, rhinorrhea, postnasal drip and ear discharge. Eyes: Negative for photophobia and visual disturbance. Respiratory: Negative for cough, chest tightness, shortness of breath and wheezing. Cardiovascular: Negative for chest pain and leg swelling. Gastrointestinal: Negative for nausea, vomiting, abdominal pain, diarrhea and constipation. Genitourinary: Negative for dysuria, urgency and frequency.    Neurological: Negative for weakness, light-headedness and headaches. Psychiatric/Behavioral: Negative for sleep disturbance.      :     Vitals:    10/19/22 1301   BP: (!) 140/90   Site: Left Upper Arm   Position: Sitting   Cuff Size: Medium Adult   Pulse: 90   Resp: 17   Temp: 97.6 °F (36.4 °C)   TempSrc: Temporal   SpO2: 97%   Weight: 168 lb (76.2 kg)   Height: 5' 3\" (1.6 m)     Wt Readings from Last 3 Encounters:   10/19/22 168 lb (76.2 kg)   10/07/22 164 lb (74.4 kg)   07/06/22 165 lb 9.6 oz (75.1 kg)       Physical Exam  Constitutional: Vital signs are normal. She appears well-developed and well-nourished. She is active. HENT:   Head: Normocephalic and atraumatic. Right Ear: Tympanic membrane, external ear and ear canal normal. No drainage or tenderness. Left Ear: Tympanic membrane, external ear and ear canal normal. No drainage or tenderness. Nose: Nose normal. No mucosal edema or rhinorrhea. Mouth/Throat: Uvula is midline, oropharynx is clear and moist and mucous membranes are normal. Mucous membranes are not pale. Normal dentition. No posterior oropharyngeal edema or posterior oropharyngeal erythema. Eyes: Lids are normal. Right eye exhibits no chemosis and no discharge. Left eye exhibits no chemosis and no drainage. Right conjunctiva has no hemorrhage. Left conjunctiva has no hemorrhage. Right eye exhibits normal extraocular motion. Left eye exhibits normal extraocular motion. Right pupil is round and reactive. Left pupil is round and reactive. Pupils are equal.   Cardiovascular: Normal rate, regular rhythm, S1 normal, S2 normal and normal heart sounds. Exam reveals no gallop. No murmur heard. Pulmonary/Chest: Effort normal and breath sounds normal. No respiratory distress. She has no wheezes. She has no rhonchi. She has no rales. Abdominal: Soft. Normal appearance and bowel sounds are normal. She exhibits no distension and no mass. There is no hepatosplenomegaly. No tenderness.  She has no rigidity, no rebound and no guarding. No hernia. Musculoskeletal:        Right lower leg: She exhibits no edema. Left lower leg: She exhibits no edema. Neurological: She is alert. Assessment/Plan   Daphnie Zavala was seen today for 6 month follow-up. Diagnoses and all orders for this visit:    Routine general medical examination at a health care facility  -     CBC; Future  -     Comprehensive Metabolic Panel, Fasting; Future  -     Lipid Panel; Future  -     TSH with Reflex; Future    Essential hypertension  -     losartan-hydroCHLOROthiazide (HYZAAR) 50-12.5 MG per tablet; Take 1 tablet by mouth daily    Vertigo    Multiple sclerosis (HCC)    Primary osteoarthritis involving multiple joints    Acute bacterial sinusitis  -     methylPREDNISolone (MEDROL DOSEPACK) 4 MG tablet; Take by mouth. -     cefdinir (OMNICEF) 300 MG capsule; Take 1 capsule by mouth 2 times daily for 10 days  -     fluticasone (FLONASE) 50 MCG/ACT nasal spray; 2 sprays by Each Nostril route daily    Push fluids  Tylenol or ibuprofen prn fever  flonase every morning 2 sprays each side and saline nasal spray 4-5 times daily    Follow up if not better in 1 week or if symptoms get worse. Discussed use, benefit, and side effectsof prescribed medications. All patient questions answered. Pt voiced understanding. Reviewed health maintenance. Instructed to continue current medications, diet and exercise. Patient agreed with treatment plan. Followup as directed.      Electronically signed by Woodrow Mena MD

## 2022-12-29 DIAGNOSIS — G35 MULTIPLE SCLEROSIS (HCC): ICD-10-CM

## 2022-12-29 RX ORDER — GABAPENTIN 300 MG/1
CAPSULE ORAL
Qty: 90 CAPSULE | Refills: 1 | Status: SHIPPED | OUTPATIENT
Start: 2022-12-29 | End: 2023-06-27

## 2023-01-06 ENCOUNTER — HOSPITAL ENCOUNTER (OUTPATIENT)
Dept: NURSING | Age: 63
Discharge: HOME | End: 2023-01-06
Payer: COMMERCIAL

## 2023-01-06 VITALS
SYSTOLIC BLOOD PRESSURE: 148 MMHG | TEMPERATURE: 97.4 F | DIASTOLIC BLOOD PRESSURE: 90 MMHG | RESPIRATION RATE: 16 BRPM | BODY MASS INDEX: 29.65 KG/M2 | WEIGHT: 167.4 LBS | OXYGEN SATURATION: 98 % | HEART RATE: 89 BPM

## 2023-01-06 DIAGNOSIS — G35 MULTIPLE SCLEROSIS, RELAPSING-REMITTING (HCC): Primary | ICD-10-CM

## 2023-01-06 PROCEDURE — 2580000003 HC RX 258: Performed by: NURSE PRACTITIONER

## 2023-01-06 PROCEDURE — 6370000000 HC RX 637 (ALT 250 FOR IP): Performed by: NURSE PRACTITIONER

## 2023-01-06 PROCEDURE — 96365 THER/PROPH/DIAG IV INF INIT: CPT

## 2023-01-06 PROCEDURE — 96366 THER/PROPH/DIAG IV INF ADDON: CPT

## 2023-01-06 PROCEDURE — 6360000002 HC RX W HCPCS: Performed by: NURSE PRACTITIONER

## 2023-01-06 RX ORDER — SODIUM CHLORIDE 9 MG/ML
5-250 INJECTION, SOLUTION INTRAVENOUS PRN
OUTPATIENT
Start: 2023-04-05

## 2023-01-06 RX ORDER — SODIUM CHLORIDE 9 MG/ML
INJECTION, SOLUTION INTRAVENOUS CONTINUOUS
OUTPATIENT
Start: 2023-04-05

## 2023-01-06 RX ORDER — ACETAMINOPHEN 325 MG/1
650 TABLET ORAL ONCE
Status: COMPLETED | OUTPATIENT
Start: 2023-01-06 | End: 2023-01-06

## 2023-01-06 RX ORDER — DIPHENHYDRAMINE HYDROCHLORIDE 50 MG/ML
50 INJECTION INTRAMUSCULAR; INTRAVENOUS ONCE
Status: COMPLETED | OUTPATIENT
Start: 2023-01-06 | End: 2023-01-06

## 2023-01-06 RX ORDER — ONDANSETRON 2 MG/ML
8 INJECTION INTRAMUSCULAR; INTRAVENOUS
OUTPATIENT
Start: 2023-04-05

## 2023-01-06 RX ORDER — DIPHENHYDRAMINE HYDROCHLORIDE 50 MG/ML
50 INJECTION INTRAMUSCULAR; INTRAVENOUS ONCE
OUTPATIENT
Start: 2023-04-05

## 2023-01-06 RX ORDER — METHYLPREDNISOLONE SODIUM SUCCINATE 125 MG/2ML
100 INJECTION, POWDER, LYOPHILIZED, FOR SOLUTION INTRAMUSCULAR; INTRAVENOUS ONCE
Status: COMPLETED | OUTPATIENT
Start: 2023-01-06 | End: 2023-01-06

## 2023-01-06 RX ORDER — ACETAMINOPHEN 325 MG/1
650 TABLET ORAL
OUTPATIENT
Start: 2023-04-05

## 2023-01-06 RX ORDER — SODIUM CHLORIDE 0.9 % (FLUSH) 0.9 %
5-40 SYRINGE (ML) INJECTION PRN
OUTPATIENT
Start: 2023-04-05

## 2023-01-06 RX ORDER — METHYLPREDNISOLONE SODIUM SUCCINATE 125 MG/2ML
100 INJECTION, POWDER, LYOPHILIZED, FOR SOLUTION INTRAMUSCULAR; INTRAVENOUS ONCE
OUTPATIENT
Start: 2023-04-05

## 2023-01-06 RX ORDER — ACETAMINOPHEN 325 MG/1
650 TABLET ORAL ONCE
OUTPATIENT
Start: 2023-04-05

## 2023-01-06 RX ORDER — SODIUM CHLORIDE 9 MG/ML
5-250 INJECTION, SOLUTION INTRAVENOUS PRN
Status: DISCONTINUED | OUTPATIENT
Start: 2023-01-06 | End: 2023-01-07 | Stop reason: HOSPADM

## 2023-01-06 RX ORDER — ALBUTEROL SULFATE 90 UG/1
4 AEROSOL, METERED RESPIRATORY (INHALATION) PRN
OUTPATIENT
Start: 2023-04-05

## 2023-01-06 RX ORDER — DIPHENHYDRAMINE HYDROCHLORIDE 50 MG/ML
50 INJECTION INTRAMUSCULAR; INTRAVENOUS
OUTPATIENT
Start: 2023-04-05

## 2023-01-06 RX ORDER — HEPARIN SODIUM (PORCINE) LOCK FLUSH IV SOLN 100 UNIT/ML 100 UNIT/ML
500 SOLUTION INTRAVENOUS PRN
OUTPATIENT
Start: 2023-04-05

## 2023-01-06 RX ADMIN — ACETAMINOPHEN 650 MG: 325 TABLET ORAL at 09:26

## 2023-01-06 RX ADMIN — OCRELIZUMAB 600 MG: 300 INJECTION INTRAVENOUS at 10:11

## 2023-01-06 RX ADMIN — METHYLPREDNISOLONE SODIUM SUCCINATE 100 MG: 125 INJECTION, POWDER, FOR SOLUTION INTRAMUSCULAR; INTRAVENOUS at 09:28

## 2023-01-06 RX ADMIN — SODIUM CHLORIDE 20 ML/HR: 9 INJECTION, SOLUTION INTRAVENOUS at 09:19

## 2023-01-06 RX ADMIN — DIPHENHYDRAMINE HYDROCHLORIDE 50 MG: 50 INJECTION, SOLUTION INTRAMUSCULAR; INTRAVENOUS at 09:31

## 2023-01-06 ASSESSMENT — PAIN SCALES - GENERAL: PAINLEVEL_OUTOF10: 0

## 2023-01-06 NOTE — DISCHARGE INSTRUCTIONS
OCREVUS DISCHARGE INSTRUCTION SHEET    YOU ARE ADVISED TO CARRY OUT THE FOLLOWING INSTRUCTIONS:    DIET:  As tolerated    ACTIVIITY:  As tolerated    OTHER:  CALL YOUR DOCTOR OR RETURN TO THE NEAREST EMERGENCY ROOM IF:    A. Allergic reactions including serious allergic reactions. Symptom can include:   Hives   Itching   Trouble breathing   Chest pain   Dizziness   Wheezing   Chills   Rash   Nausea   Flushing of skin   Low blood pressure    B. Liver damage. [place] may cause liver damage. Symptoms can include:   Yellowing of the skin and eyes (jaundice)   Nausea   Vomiting   Unusual darkening of the urine   Feeling tired or weak    C. Infections. [place] may increase your chance of getting an unusual or serious infection because of OCREVUS can affect your immune system.

## 2023-01-06 NOTE — PROGRESS NOTES
0900 Patient ambulatory to [facility] for Ocrevus infusion. Patient denies any recent infections or antibiotics use. PT RIGHTS AND RESPONSIBILITIES OFFERED TO PT.     [id number] Pre meds given to patient. Tolerated well. 1011 Ocrevus infusion started. 1040 Infusion increased. Patient tolerated well. 1110 Infusion increased. Patient tolerating well. 1140 Infusion increased. Patient tolerating well. 1210 Infusion at max rate. Patient tolerating. Denies any complaints. 1403 Infusion complete. Patient tolerated well. Patient denies any complaints. 1510 Patient denies any complaints. AVS reviewed with patient. Verbalizes understanding. Patient left ambulatory to discharge lobby.          _M___ Safety:       (Environmental)  [place] to environment  Ensure ID band is correct and in place/ allergy band as needed  Assess for fall risk  Initiate fall precautions as applicable (fall band, side rails, etc.)  Call light within reach  Bed in low position/ wheels locked    __M__ Pain:       Assess pain level and characteristics  Administer analgesics as ordered  Assess effectiveness of pain management and report to MD as needed    _M___ Knowledge Deficit:  Assess baseline knowledge  Provide teaching at level of understanding  Provide teaching via preferred learning method  Evaluate teaching effectiveness    _M___ Hemodynamic/Respiratory Status:       (Pre and Post Procedure Monitoring)  Assess/Monitor vital signs and LOC  Assess Baseline SpO2 prior to any sedation  Obtain weight/height  Assess vital signs/ LOC until patient meets discharge criteria  Monitor procedure site and notify MD of any issues

## 2023-03-07 ENCOUNTER — OFFICE VISIT (OUTPATIENT)
Dept: FAMILY MEDICINE CLINIC | Age: 63
End: 2023-03-07

## 2023-03-07 VITALS
HEART RATE: 105 BPM | SYSTOLIC BLOOD PRESSURE: 138 MMHG | OXYGEN SATURATION: 100 % | TEMPERATURE: 97.3 F | BODY MASS INDEX: 30.29 KG/M2 | RESPIRATION RATE: 14 BRPM | WEIGHT: 171 LBS | DIASTOLIC BLOOD PRESSURE: 84 MMHG

## 2023-03-07 DIAGNOSIS — L03.116 CELLULITIS OF LEFT LOWER EXTREMITY: ICD-10-CM

## 2023-03-07 DIAGNOSIS — L50.9 URTICARIAL RASH: Primary | ICD-10-CM

## 2023-03-07 RX ORDER — SULFAMETHOXAZOLE AND TRIMETHOPRIM 800; 160 MG/1; MG/1
1 TABLET ORAL 2 TIMES DAILY
Qty: 20 TABLET | Refills: 0 | Status: SHIPPED | OUTPATIENT
Start: 2023-03-07 | End: 2023-03-17

## 2023-03-07 RX ORDER — PREDNISONE 10 MG/1
TABLET ORAL
Qty: 32 TABLET | Refills: 0 | Status: SHIPPED | OUTPATIENT
Start: 2023-03-07

## 2023-03-07 RX ORDER — TRIAMCINOLONE ACETONIDE 40 MG/ML
40 INJECTION, SUSPENSION INTRA-ARTICULAR; INTRAMUSCULAR ONCE
Status: COMPLETED | OUTPATIENT
Start: 2023-03-07 | End: 2023-03-07

## 2023-03-07 RX ADMIN — TRIAMCINOLONE ACETONIDE 40 MG: 40 INJECTION, SUSPENSION INTRA-ARTICULAR; INTRAMUSCULAR at 14:06

## 2023-03-07 SDOH — ECONOMIC STABILITY: HOUSING INSECURITY
IN THE LAST 12 MONTHS, WAS THERE A TIME WHEN YOU DID NOT HAVE A STEADY PLACE TO SLEEP OR SLEPT IN A SHELTER (INCLUDING NOW)?: NO

## 2023-03-07 SDOH — ECONOMIC STABILITY: FOOD INSECURITY: WITHIN THE PAST 12 MONTHS, THE FOOD YOU BOUGHT JUST DIDN'T LAST AND YOU DIDN'T HAVE MONEY TO GET MORE.: NEVER TRUE

## 2023-03-07 SDOH — ECONOMIC STABILITY: FOOD INSECURITY: WITHIN THE PAST 12 MONTHS, YOU WORRIED THAT YOUR FOOD WOULD RUN OUT BEFORE YOU GOT MONEY TO BUY MORE.: NEVER TRUE

## 2023-03-07 SDOH — ECONOMIC STABILITY: INCOME INSECURITY: HOW HARD IS IT FOR YOU TO PAY FOR THE VERY BASICS LIKE FOOD, HOUSING, MEDICAL CARE, AND HEATING?: NOT HARD AT ALL

## 2023-03-07 ASSESSMENT — PATIENT HEALTH QUESTIONNAIRE - PHQ9
2. FEELING DOWN, DEPRESSED OR HOPELESS: 0
SUM OF ALL RESPONSES TO PHQ QUESTIONS 1-9: 0
1. LITTLE INTEREST OR PLEASURE IN DOING THINGS: 0
SUM OF ALL RESPONSES TO PHQ9 QUESTIONS 1 & 2: 0
SUM OF ALL RESPONSES TO PHQ QUESTIONS 1-9: 0

## 2023-03-07 NOTE — PROGRESS NOTES
[facility] PROFESSIONAL SERVS  [facility] MEDICINE  [address]  [address]  [facility] [address]  Dept: [phone]  Dept Fax: [phone]  Loc: [phone]    Valerie Carmona is a 62 y.o. female who presents today for:  Chief Complaint   Patient presents with    Rash         HPI:     HPI  Here for a rash on the arms and legs that started 3-2-23.   Started on the lower legs and the left leg is swollen.  Started on the arms 5 days ago.  Support stockings made the legs worse.  Now also on the abdomen.  Arms started after she tried on a new shirt.    Benadryl and cortisone 10 was no help.  No new exposures.  Under lots of stress with her mom sick and dying.    Reviewed chart forpast medical history , surgical history , allergies, social history , family history and medications.    Health Maintenance   Topic Date Due    HIV screen  Never done    Hepatitis C screen  Never done    Shingles vaccine (1 of 2) 10/19/2023 (Originally 3/19/2010)    Depression Screen  10/19/2023    Breast cancer screen  10/19/2023    Colorectal Cancer Screen  06/11/2025    Lipids  04/18/2027    DTaP/Tdap/Td vaccine (2 - Td or Tdap) 10/22/2031    Flu vaccine  Completed    COVID-19 Vaccine  Completed    Hepatitis A vaccine  Aged Out    Hib vaccine  Aged Out    Meningococcal (ACWY) vaccine  Aged Out    Pneumococcal 0-64 years Vaccine  Aged Out       Subjective:      Constitutional:Negative for fever, chills, diaphoresis, activity change, appetite change and fatigue.   HENT: Negative for hearing loss, ear pain, congestion, sore throat, rhinorrhea, postnasal drip and ear discharge.    Eyes: Negative for photophobia and visual disturbance.   Respiratory: Negative for cough, chest tightness, shortness of breath and wheezing.    Cardiovascular: Negative for chest pain and leg swelling.   Gastrointestinal: Negative for nausea, vomiting, abdominal pain, diarrhea and constipation.   Genitourinary: Negative for dysuria, urgency  and frequency. Neurological: Negative for weakness, light-headedness and headaches. Psychiatric/Behavioral: Negative for sleep disturbance.      :     Vitals:    03/07/23 1342   BP: 138/84   Site: Left Upper Arm   Position: Sitting   Cuff Size: Large Adult   Pulse: (!) 105   Resp: 14   Temp: 97.3 °F (36.3 °C)   TempSrc: Temporal   SpO2: 100%   Weight: 171 lb (77.6 kg)     Wt Readings from Last 3 Encounters:   03/07/23 171 lb (77.6 kg)   01/06/23 167 lb 6.4 oz (75.9 kg)   10/19/22 168 lb (76.2 kg)       Physical Exam  Constitutional: Vital signs are normal. She appears well-developed and well-nourished. She is active. Eyes: Lids are normal. Right eye exhibits no chemosis and no discharge. Left eye exhibits no chemosis and no drainage. Right conjunctiva has no hemorrhage. Left conjunctiva has no hemorrhage. Right eye exhibits normal extraocular motion. Left eye exhibits normal extraocular motion. Right pupil is round and reactive. Left pupil is round and reactive. Pupils are equal.   Cardiovascular: Normal rate, regular rhythm, S1 normal, S2 normal and normal heart sounds. Exam reveals no gallop. No murmur heard. Pulmonary/Chest: Effort normal and breath sounds normal. No respiratory distress. She has no wheezes. She has no rhonchi. She has no rales. Abdominal: Soft. Normal appearance and bowel sounds are normal. She exhibits no distension and no mass. There is no hepatosplenomegaly. No tenderness. She has no rigidity, no rebound and no guarding. No hernia. Musculoskeletal:        Right lower leg: She exhibits 1+ edema. Left lower leg: She exhibits 3+ edema. Neurological: She is alert. Skin:  urticarial rash on the arms and legs and abdomen        Assessment/Plan   Won Rahman was seen today for rash. Diagnoses and all orders for this visit:    Urticarial rash  -     sulfamethoxazole-trimethoprim (BACTRIM DS;SEPTRA DS) 800-160 MG per tablet;  Take 1 tablet by mouth 2 times daily for 10 days  - predniSONE (DELTASONE) 10 MG tablet; 4 tabs for 3 days, 3 tabs for 3 days, 2 tabs for 3 days, 1 tab for 3 days, 1/2 tab for 4 days  -     triamcinolone acetonide (KENALOG-40) injection 40 mg    Cellulitis of left lower extremity  -     sulfamethoxazole-trimethoprim (BACTRIM DS;SEPTRA DS) 800-160 MG per tablet; Take 1 tablet by mouth 2 times daily for 10 days  -     predniSONE (DELTASONE) 10 MG tablet; 4 tabs for 3 days, 3 tabs for 3 days, 2 tabs for 3 days, 1 tab for 3 days, 1/2 tab for 4 days  -     triamcinolone acetonide (KENALOG-40) injection 40 mg    Stay cool  Benadryl prn  Call or return to clinic prn if these symptoms worsen or fail to improve as anticipated. Discussed use, benefit, and side effectsof prescribed medications. All patient questions answered. Pt voiced understanding. Reviewed health maintenance. Instructed to continue current medications, diet and exercise. Patient agreed with treatment plan. Followup as directed.      Electronically signed by Inessa Landers MD

## 2023-03-07 NOTE — PROGRESS NOTES
Administrations This Visit       triamcinolone acetonide (KENALOG-40) injection 40 mg       Admin Date  03/07/2023  14:06 Action  Given Dose  40 mg Route  IntraMUSCular Site  Dorsogluteal Right Administered By  Halie Chavez CMA ([address] [place] [address])    Ordering Provider: Freeman Grossman MD    NDC: 4598-7814-05    Lot#: [id number]    : B-Package [employer] (PRIMARY CARE)    Patient Supplied?: No                    Patient instructed to remain in clinic for 20 minutes after injection and was advised to report any adverse reaction to me immediately.

## 2023-03-17 ENCOUNTER — TELEPHONE (OUTPATIENT)
Dept: NURSING | Age: 63
End: 2023-03-17

## 2023-03-17 NOTE — TELEPHONE ENCOUNTER
Infusion Financial Navigator:  Jenniffer Jeff    This patient has contacted me concerning an outstanding bill for her Ocrevus infusion done in July 2022. It is my understanding this bill has now been sent to collections. I currently have this patient enrolled in the [facility] assistance program for her Ocrevus infusion. However, I am only able to send in the patient responsibility for the claim since she is commercially insured. Diane Rangel has not made a payment towards her claim of $179k+. I referred back to the authorization done back in May 2022 and it shows no pre cert is required. Diane Rangel has confirmed this is correct. I then contacted our local [employer] for [place] claims; the rep stated the claim was DENIED for code [phone] listed as investigative procedure. I requested that the claim be submitted back for review as it was filed incorrectly as investigative procedure and clarified that it was an Ocrevus infusion. TICKET: [id number]  CALL REFERENCE: [id number]    I will call and update the patient and let her know this claim is being looked into. I cannot submit for copay assistance until the insurance has processed a payment (their max limit also would not come close to covering this claim in full).

## 2023-03-22 ENCOUNTER — NURSE ONLY (OUTPATIENT)
Dept: LAB | Age: 63
End: 2023-03-22

## 2023-03-22 DIAGNOSIS — G35 MULTIPLE SCLEROSIS, RELAPSING-REMITTING (HCC): ICD-10-CM

## 2023-03-22 DIAGNOSIS — G35 MULTIPLE SCLEROSIS (HCC): ICD-10-CM

## 2023-03-22 LAB
BASOPHILS ABSOLUTE: 0.1 THOU/MM3 (ref 0–0.1)
BASOPHILS NFR BLD AUTO: 0.6 %
CHOLEST SERPL-MCNC: 190 MG/DL (ref 100–199)
DEPRECATED RDW RBC AUTO: 45.6 FL (ref 35–45)
EOSINOPHIL NFR BLD AUTO: 6.3 %
EOSINOPHILS ABSOLUTE: 0.6 THOU/MM3 (ref 0–0.4)
ERYTHROCYTE [DISTWIDTH] IN BLOOD BY AUTOMATED COUNT: 13.4 % (ref 11.5–14.5)
HCT VFR BLD AUTO: 41.2 % (ref 37–47)
HDLC SERPL-MCNC: 88 MG/DL
HGB BLD-MCNC: 13 GM/DL (ref 12–16)
IMM GRANULOCYTES # BLD AUTO: 0.02 THOU/MM3 (ref 0–0.07)
IMM GRANULOCYTES NFR BLD AUTO: 0.2 %
LDLC SERPL CALC-MCNC: 83 MG/DL
LYMPHOCYTES ABSOLUTE: 3 THOU/MM3 (ref 1–4.8)
LYMPHOCYTES NFR BLD AUTO: 33.2 %
MCH RBC QN AUTO: 29.6 PG (ref 26–33)
MCHC RBC AUTO-ENTMCNC: 31.6 GM/DL (ref 32.2–35.5)
MCV RBC AUTO: 93.8 FL (ref 81–99)
MONOCYTES ABSOLUTE: 0.7 THOU/MM3 (ref 0.4–1.3)
MONOCYTES NFR BLD AUTO: 7.5 %
NEUTROPHILS NFR BLD AUTO: 52.2 %
NRBC BLD AUTO-RTO: 0 /100 WBC
PLATELET # BLD AUTO: 306 THOU/MM3 (ref 130–400)
PMV BLD AUTO: 9.4 FL (ref 9.4–12.4)
RBC # BLD AUTO: 4.39 MILL/MM3 (ref 4.2–5.4)
SEGMENTED NEUTROPHILS ABSOLUTE COUNT: 4.6 THOU/MM3 (ref 1.8–7.7)
TRIGL SERPL-MCNC: 93 MG/DL (ref 0–199)
TSH SERPL DL<=0.005 MIU/L-ACNC: 1.18 UIU/ML (ref 0.4–4.2)
WBC # BLD AUTO: 8.9 THOU/MM3 (ref 4.8–10.8)

## 2023-03-23 LAB
ALBUMIN SERPL BCG-MCNC: 4.1 G/DL (ref 3.5–5.1)
ALP SERPL-CCNC: 107 U/L (ref 38–126)
ALT SERPL W/O P-5'-P-CCNC: 18 U/L (ref 11–66)
ANION GAP SERPL CALC-SCNC: 9 MEQ/L (ref 8–16)
AST SERPL-CCNC: 16 U/L (ref 5–40)
BILIRUB CONJ SERPL-MCNC: < 0.2 MG/DL (ref 0–0.3)
BILIRUB SERPL-MCNC: 0.4 MG/DL (ref 0.3–1.2)
BUN SERPL-MCNC: 18 MG/DL (ref 7–22)
CALCIUM SERPL-MCNC: 10 MG/DL (ref 8.5–10.5)
CHLORIDE SERPL-SCNC: 106 MEQ/L (ref 98–111)
CO2 SERPL-SCNC: 31 MEQ/L (ref 23–33)
CREAT SERPL-MCNC: 0.8 MG/DL (ref 0.4–1.2)
GFR SERPL CREATININE-BSD FRML MDRD: > 60 ML/MIN/1.73M2
GLUCOSE SERPL-MCNC: 83 MG/DL (ref 70–108)
POTASSIUM SERPL-SCNC: 4.6 MEQ/L (ref 3.5–5.2)
PROT SERPL-MCNC: 6.6 G/DL (ref 6.1–8)
SODIUM SERPL-SCNC: 146 MEQ/L (ref 135–145)

## 2023-03-29 DIAGNOSIS — G35 MULTIPLE SCLEROSIS (HCC): ICD-10-CM

## 2023-03-29 DIAGNOSIS — I10 ESSENTIAL HYPERTENSION: ICD-10-CM

## 2023-03-29 RX ORDER — LOSARTAN POTASSIUM AND HYDROCHLOROTHIAZIDE 12.5; 5 MG/1; MG/1
TABLET ORAL
Qty: 90 TABLET | Refills: 0 | Status: SHIPPED | OUTPATIENT
Start: 2023-03-29

## 2023-03-29 RX ORDER — CYCLOBENZAPRINE HCL 10 MG
10 TABLET ORAL NIGHTLY
Qty: 90 TABLET | Refills: 0 | Status: SHIPPED | OUTPATIENT
Start: 2023-03-29

## 2023-04-17 NOTE — PROGRESS NOTES
edema. Left lower leg: She exhibits no edema. Neurological: She is alert. Assessment/Plan   Citlaly Padgett was seen today for follow-up. Diagnoses and all orders for this visit:    Multiple sclerosis ([address]    Essential hypertension    Urticarial rash    Cellulitis of left lower extremity    Vertigo    Primary osteoarthritis involving multiple joints    Acute bacterial sinusitis  -     loratadine (CLARITIN) 10 MG tablet; Take 1 tablet by mouth daily  -     fluticasone (FLONASE) 50 MCG/ACT nasal spray; 2 sprays by Each Nostril route daily      Push fluids  Tylenol or ibuprofen prn fever  flonase every morning 2 sprays each side and saline nasal spray 4-5 times daily    Follow up if not better in 1 week or if symptoms get worse. Discussed use, benefit, and side effectsof prescribed medications. All patient questions answered. Pt voiced understanding. Reviewed health maintenance. Instructed to continue current medications, diet and exercise. Patient agreed with treatment plan. Followup as directed.      Electronically signed by Ayala Rosa MD

## 2023-04-18 ENCOUNTER — OFFICE VISIT (OUTPATIENT)
Dept: FAMILY MEDICINE CLINIC | Age: 63
End: 2023-04-18
Payer: COMMERCIAL

## 2023-04-18 VITALS
OXYGEN SATURATION: 100 % | RESPIRATION RATE: 16 BRPM | BODY MASS INDEX: 30.48 KG/M2 | HEART RATE: 73 BPM | WEIGHT: 172 LBS | HEIGHT: 63 IN | TEMPERATURE: 97 F | DIASTOLIC BLOOD PRESSURE: 88 MMHG | SYSTOLIC BLOOD PRESSURE: 136 MMHG

## 2023-04-18 DIAGNOSIS — L50.9 URTICARIAL RASH: ICD-10-CM

## 2023-04-18 DIAGNOSIS — I10 ESSENTIAL HYPERTENSION: ICD-10-CM

## 2023-04-18 DIAGNOSIS — B96.89 ACUTE BACTERIAL SINUSITIS: ICD-10-CM

## 2023-04-18 DIAGNOSIS — G35 MULTIPLE SCLEROSIS (HCC): Primary | ICD-10-CM

## 2023-04-18 DIAGNOSIS — R42 VERTIGO: ICD-10-CM

## 2023-04-18 DIAGNOSIS — L03.116 CELLULITIS OF LEFT LOWER EXTREMITY: ICD-10-CM

## 2023-04-18 DIAGNOSIS — J01.90 ACUTE BACTERIAL SINUSITIS: ICD-10-CM

## 2023-04-18 DIAGNOSIS — M15.9 PRIMARY OSTEOARTHRITIS INVOLVING MULTIPLE JOINTS: ICD-10-CM

## 2023-04-18 PROCEDURE — 99214 OFFICE O/P EST MOD 30 MIN: CPT | Performed by: FAMILY MEDICINE

## 2023-04-18 PROCEDURE — 3075F SYST BP GE 130 - 139MM HG: CPT | Performed by: FAMILY MEDICINE

## 2023-04-18 PROCEDURE — 3079F DIAST BP 80-89 MM HG: CPT | Performed by: FAMILY MEDICINE

## 2023-04-18 RX ORDER — FLUTICASONE PROPIONATE 50 MCG
2 SPRAY, SUSPENSION (ML) NASAL DAILY
Qty: 3 EACH | Refills: 3 | Status: SHIPPED | OUTPATIENT
Start: 2023-04-18

## 2023-04-18 RX ORDER — LORATADINE 10 MG/1
10 TABLET ORAL DAILY
Qty: 90 TABLET | Refills: 3 | Status: SHIPPED | OUTPATIENT
Start: 2023-04-18

## 2023-04-26 ENCOUNTER — NURSE ONLY (OUTPATIENT)
Dept: LAB | Age: 63
End: 2023-04-26

## 2023-04-26 DIAGNOSIS — G35 MULTIPLE SCLEROSIS (HCC): ICD-10-CM

## 2023-04-27 LAB
IGA SERPL-MCNC: 171 MG/DL (ref 70–400)
IGG SERPL-MCNC: 861 MG/DL (ref 700–1600)
IGM SERPL-MCNC: < 25 MG/DL (ref 40–230)

## 2023-05-19 ENCOUNTER — OFFICE VISIT (OUTPATIENT)
Dept: NEUROLOGY | Age: 63
End: 2023-05-19
Payer: COMMERCIAL

## 2023-05-19 VITALS
HEIGHT: 63 IN | OXYGEN SATURATION: 99 % | BODY MASS INDEX: 30.48 KG/M2 | WEIGHT: 172 LBS | SYSTOLIC BLOOD PRESSURE: 140 MMHG | HEART RATE: 85 BPM | DIASTOLIC BLOOD PRESSURE: 85 MMHG

## 2023-05-19 DIAGNOSIS — R42 VERTIGO: ICD-10-CM

## 2023-05-19 DIAGNOSIS — R26.0 ATAXIC GAIT: Primary | ICD-10-CM

## 2023-05-19 DIAGNOSIS — G35 MULTIPLE SCLEROSIS (HCC): ICD-10-CM

## 2023-05-19 PROCEDURE — 99213 OFFICE O/P EST LOW 20 MIN: CPT | Performed by: NURSE PRACTITIONER

## 2023-05-19 RX ORDER — AMANTADINE HYDROCHLORIDE 100 MG/1
100 CAPSULE, GELATIN COATED ORAL 2 TIMES DAILY
Qty: 180 CAPSULE | Refills: 3 | Status: SHIPPED | OUTPATIENT
Start: 2023-05-19

## 2023-06-06 ENCOUNTER — HOSPITAL ENCOUNTER (OUTPATIENT)
Dept: MRI IMAGING | Age: 63
Discharge: HOME | End: 2023-06-06
Payer: COMMERCIAL

## 2023-06-06 DIAGNOSIS — G35 MULTIPLE SCLEROSIS (HCC): ICD-10-CM

## 2023-06-06 DIAGNOSIS — R42 VERTIGO: ICD-10-CM

## 2023-06-06 DIAGNOSIS — R26.0 ATAXIC GAIT: ICD-10-CM

## 2023-06-06 LAB
CREAT BLD-MCNC: 1 MG/DL (ref 0.5–1.2)
GFR SERPL CREATININE-BSD FRML MDRD: > 60 ML/MIN/1.73M2

## 2023-06-06 PROCEDURE — A9579 GAD-BASE MR CONTRAST NOS,1ML: HCPCS | Performed by: NURSE PRACTITIONER

## 2023-06-06 PROCEDURE — 70553 MRI BRAIN STEM W/O & W/DYE: CPT

## 2023-06-06 PROCEDURE — 6360000004 HC RX CONTRAST MEDICATION: Performed by: NURSE PRACTITIONER

## 2023-06-06 RX ADMIN — GADOTERIDOL 20 ML: 279.3 INJECTION, SOLUTION INTRAVENOUS at 15:33

## 2023-06-07 ENCOUNTER — TELEPHONE (OUTPATIENT)
Dept: NEUROLOGY | Age: 63
End: 2023-06-07

## 2023-06-07 DIAGNOSIS — G35 MULTIPLE SCLEROSIS, RELAPSING-REMITTING (HCC): Primary | Chronic | ICD-10-CM

## 2023-06-07 RX ORDER — SODIUM CHLORIDE 9 MG/ML
5-250 INJECTION, SOLUTION INTRAVENOUS PRN
OUTPATIENT
Start: 2023-06-07

## 2023-06-07 RX ORDER — HEPARIN SODIUM (PORCINE) LOCK FLUSH IV SOLN 100 UNIT/ML 100 UNIT/ML
500 SOLUTION INTRAVENOUS PRN
Status: CANCELLED | OUTPATIENT
Start: 2023-06-07

## 2023-06-07 RX ORDER — FAMOTIDINE 10 MG/ML
20 INJECTION, SOLUTION INTRAVENOUS
OUTPATIENT
Start: 2023-06-07

## 2023-06-07 RX ORDER — EPINEPHRINE 1 MG/ML
0.3 INJECTION, SOLUTION, CONCENTRATE INTRAVENOUS PRN
OUTPATIENT
Start: 2023-06-07

## 2023-06-07 RX ORDER — ACETAMINOPHEN 325 MG/1
650 TABLET ORAL
OUTPATIENT
Start: 2023-06-07

## 2023-06-07 RX ORDER — DIPHENHYDRAMINE HYDROCHLORIDE 50 MG/ML
50 INJECTION INTRAMUSCULAR; INTRAVENOUS ONCE
OUTPATIENT
Start: 2023-06-07 | End: 2023-06-07

## 2023-06-07 RX ORDER — SODIUM CHLORIDE 9 MG/ML
INJECTION, SOLUTION INTRAVENOUS CONTINUOUS
OUTPATIENT
Start: 2023-06-07

## 2023-06-07 RX ORDER — SODIUM CHLORIDE 0.9 % (FLUSH) 0.9 %
5-40 SYRINGE (ML) INJECTION PRN
OUTPATIENT
Start: 2023-06-07

## 2023-06-07 RX ORDER — METHYLPREDNISOLONE SODIUM SUCCINATE 125 MG/2ML
125 INJECTION, POWDER, LYOPHILIZED, FOR SOLUTION INTRAMUSCULAR; INTRAVENOUS ONCE
OUTPATIENT
Start: 2023-06-07

## 2023-06-07 RX ORDER — ALBUTEROL SULFATE 90 UG/1
4 AEROSOL, METERED RESPIRATORY (INHALATION) PRN
OUTPATIENT
Start: 2023-06-07

## 2023-06-07 RX ORDER — ONDANSETRON 2 MG/ML
8 INJECTION INTRAMUSCULAR; INTRAVENOUS
OUTPATIENT
Start: 2023-06-07

## 2023-06-07 RX ORDER — ACETAMINOPHEN 325 MG/1
650 TABLET ORAL ONCE
OUTPATIENT
Start: 2023-06-07 | End: 2023-06-07

## 2023-06-07 RX ORDER — DIPHENHYDRAMINE HYDROCHLORIDE 50 MG/ML
50 INJECTION INTRAMUSCULAR; INTRAVENOUS
OUTPATIENT
Start: 2023-06-07

## 2023-06-07 NOTE — TELEPHONE ENCOUNTER
Patient notified and verbalized understanding. Patient stated she had Vertigo treatment yesterday, prior to MRI. She had worsening vertigo after her MRI. She slept for about 2 hours afterwards and felt better when she woke up. Today she has some dizziness and feels like she has a cold. Her throat is sore and her chest hurts. She has nonproductive mild cough. She did not take her allergy medication last night due to not feeling well with dizziness.

## 2023-06-07 NOTE — TELEPHONE ENCOUNTER
----- Message from KYLER Grover CNP sent at 6/7/2023  7:43 AM EDT -----  Please let patient know her MRI brain was Stable. mild severity nonspecific focal areas of T2/FLAIR hyperintensity in the supratentorial white matter.   George Torres, MACY

## 2023-07-11 ENCOUNTER — HOSPITAL ENCOUNTER (OUTPATIENT)
Dept: NURSING | Age: 63
Discharge: HOME | End: 2023-07-11
Payer: COMMERCIAL

## 2023-07-11 VITALS
RESPIRATION RATE: 16 BRPM | TEMPERATURE: 96.4 F | DIASTOLIC BLOOD PRESSURE: 81 MMHG | SYSTOLIC BLOOD PRESSURE: 139 MMHG | OXYGEN SATURATION: 97 % | WEIGHT: 171.6 LBS | BODY MASS INDEX: 30.4 KG/M2 | HEART RATE: 86 BPM

## 2023-07-11 DIAGNOSIS — G35 MULTIPLE SCLEROSIS, RELAPSING-REMITTING (HCC): Primary | ICD-10-CM

## 2023-07-11 PROCEDURE — 96365 THER/PROPH/DIAG IV INF INIT: CPT

## 2023-07-11 PROCEDURE — 96366 THER/PROPH/DIAG IV INF ADDON: CPT

## 2023-07-11 PROCEDURE — 6370000000 HC RX 637 (ALT 250 FOR IP): Performed by: NURSE PRACTITIONER

## 2023-07-11 PROCEDURE — 6360000002 HC RX W HCPCS: Performed by: NURSE PRACTITIONER

## 2023-07-11 PROCEDURE — 2580000003 HC RX 258: Performed by: NURSE PRACTITIONER

## 2023-07-11 RX ORDER — ACETAMINOPHEN 325 MG/1
650 TABLET ORAL ONCE
OUTPATIENT
Start: 2024-01-07 | End: 2024-01-07

## 2023-07-11 RX ORDER — ACETAMINOPHEN 325 MG/1
650 TABLET ORAL ONCE
Status: COMPLETED | OUTPATIENT
Start: 2023-07-11 | End: 2023-07-11

## 2023-07-11 RX ORDER — ACETAMINOPHEN 325 MG/1
650 TABLET ORAL
OUTPATIENT
Start: 2024-01-07

## 2023-07-11 RX ORDER — HEPARIN SODIUM 100 [USP'U]/ML
500 INJECTION, SOLUTION INTRAVENOUS PRN
OUTPATIENT
Start: 2024-01-07

## 2023-07-11 RX ORDER — DIPHENHYDRAMINE HYDROCHLORIDE 50 MG/ML
50 INJECTION INTRAMUSCULAR; INTRAVENOUS ONCE
OUTPATIENT
Start: 2024-01-07 | End: 2024-01-07

## 2023-07-11 RX ORDER — SODIUM CHLORIDE 9 MG/ML
5-250 INJECTION, SOLUTION INTRAVENOUS PRN
Status: DISCONTINUED | OUTPATIENT
Start: 2023-07-11 | End: 2023-07-12 | Stop reason: HOSPADM

## 2023-07-11 RX ORDER — DIPHENHYDRAMINE HYDROCHLORIDE 50 MG/ML
50 INJECTION INTRAMUSCULAR; INTRAVENOUS
OUTPATIENT
Start: 2024-01-07

## 2023-07-11 RX ORDER — SODIUM CHLORIDE 9 MG/ML
INJECTION, SOLUTION INTRAVENOUS CONTINUOUS
OUTPATIENT
Start: 2024-01-07

## 2023-07-11 RX ORDER — SODIUM CHLORIDE 9 MG/ML
5-250 INJECTION, SOLUTION INTRAVENOUS PRN
OUTPATIENT
Start: 2024-01-07

## 2023-07-11 RX ORDER — DIPHENHYDRAMINE HYDROCHLORIDE 50 MG/ML
50 INJECTION INTRAMUSCULAR; INTRAVENOUS ONCE
Status: COMPLETED | OUTPATIENT
Start: 2023-07-11 | End: 2023-07-11

## 2023-07-11 RX ORDER — SODIUM CHLORIDE 0.9 % (FLUSH) 0.9 %
5-40 SYRINGE (ML) INJECTION PRN
OUTPATIENT
Start: 2024-01-07

## 2023-07-11 RX ORDER — ALBUTEROL SULFATE 90 UG/1
4 AEROSOL, METERED RESPIRATORY (INHALATION) PRN
OUTPATIENT
Start: 2024-01-07

## 2023-07-11 RX ORDER — ONDANSETRON 2 MG/ML
8 INJECTION INTRAMUSCULAR; INTRAVENOUS
OUTPATIENT
Start: 2024-01-07

## 2023-07-11 RX ORDER — EPINEPHRINE 1 MG/ML
0.3 INJECTION, SOLUTION INTRAMUSCULAR; SUBCUTANEOUS PRN
OUTPATIENT
Start: 2024-01-07

## 2023-07-11 RX ADMIN — DIPHENHYDRAMINE HYDROCHLORIDE 50 MG: 50 INJECTION, SOLUTION INTRAMUSCULAR; INTRAVENOUS at 08:21

## 2023-07-11 RX ADMIN — OCRELIZUMAB 600 MG: 300 INJECTION INTRAVENOUS at 08:53

## 2023-07-11 RX ADMIN — METHYLPREDNISOLONE SODIUM SUCCINATE 125 MG: 125 INJECTION INTRAMUSCULAR; INTRAVENOUS at 08:21

## 2023-07-11 RX ADMIN — ACETAMINOPHEN 650 MG: 325 TABLET ORAL at 08:21

## 2023-07-11 RX ADMIN — SODIUM CHLORIDE 20 ML/HR: 9 INJECTION, SOLUTION INTRAVENOUS at 08:21

## 2023-07-11 ASSESSMENT — PAIN - FUNCTIONAL ASSESSMENT: PAIN_FUNCTIONAL_ASSESSMENT: NONE - DENIES PAIN

## 2023-07-11 NOTE — PROGRESS NOTES
0800 Patient arrived to [facility] ambulatory for ocrevus infusion. Oriented to room and call light  PT RIGHTS AND RESPONSIBILITIES OFFERED TO PT.     1612 Pre-medications given and she denies complaints    0853 Medication started and she denies complaints    0910 Medication infusing and she denies complaints    0925 Medication infusing and she denies complaints    0955 Medication infusing and she denies complaints     1100 Medication completed and she denies complaints. 1200 Patient denies complaints. Iv removed. Discharge instructions given and explained and she denies questions.  Discharged ambulatory      _M___ Safety:       (Environmental)  [place] to environment  Ensure ID band is correct and in place/ allergy band as needed  Assess for fall risk  Initiate fall precautions as applicable (fall band, side rails, etc.)  Call light within reach  Bed in low position/ wheels locked    _M___ Pain:       Assess pain level and characteristics  Administer analgesics as ordered  Assess effectiveness of pain management and report to MD as needed    __M__ Knowledge Deficit:  Assess baseline knowledge  Provide teaching at level of understanding  Provide teaching via preferred learning method  Evaluate teaching effectiveness    __M__ Hemodynamic/Respiratory Status:       (Pre and Post Procedure Monitoring)  Assess/Monitor vital signs and LOC  Assess Baseline SpO2 prior to any sedation  Obtain weight/height  Assess vital signs/ LOC until patient meets discharge criteria  Monitor procedure site and notify MD of any issues

## 2023-07-11 NOTE — DISCHARGE INSTRUCTIONS
Next appointment is scheduled for January 16 at 8:30    [facility] Discharge Instructions    Can not have an active infection or be on an antibiotic at time of infusion. Side effects: headache, fatigue, skin itchiness. Report any unusual diarrhea or severe abdominal pain to your neurologist.     Report any new or worsening neurological signs that have lasted several days such as: problems with thinking, eyesight, strength, and balance. Next Ocrevus infusion:     Please call [phone] with any questions or concerns.

## 2023-07-24 DIAGNOSIS — I10 ESSENTIAL HYPERTENSION: ICD-10-CM

## 2023-07-24 RX ORDER — LOSARTAN POTASSIUM AND HYDROCHLOROTHIAZIDE 12.5; 5 MG/1; MG/1
TABLET ORAL
Qty: 90 TABLET | Refills: 0 | Status: SHIPPED | OUTPATIENT
Start: 2023-07-24

## 2023-08-04 ENCOUNTER — OFFICE VISIT (OUTPATIENT)
Dept: NEUROLOGY | Age: 63
End: 2023-08-04
Payer: COMMERCIAL

## 2023-08-04 VITALS
OXYGEN SATURATION: 98 % | SYSTOLIC BLOOD PRESSURE: 136 MMHG | DIASTOLIC BLOOD PRESSURE: 90 MMHG | RESPIRATION RATE: 16 BRPM | HEART RATE: 76 BPM | BODY MASS INDEX: 30.3 KG/M2 | WEIGHT: 171 LBS | HEIGHT: 63 IN

## 2023-08-04 DIAGNOSIS — R26.0 ATAXIC GAIT: ICD-10-CM

## 2023-08-04 DIAGNOSIS — G35 MULTIPLE SCLEROSIS (HCC): Primary | ICD-10-CM

## 2023-08-04 PROCEDURE — 99213 OFFICE O/P EST LOW 20 MIN: CPT | Performed by: PSYCHIATRY & NEUROLOGY

## 2023-08-04 NOTE — PROGRESS NOTES
NEUROLOGY OUT PATIENT FOLLOW UP NOTE:  8/4/202312:13 PM    Mckenna Mack is here for follow up for multiple sclerosis. She is here to go over plan. No Known Allergies    Current Outpatient Medications:     losartan-hydroCHLOROthiazide (HYZAAR) 50-12.5 MG per tablet, Take 1 tablet by mouth once daily, Disp: 90 tablet, Rfl: 0    cyclobenzaprine (FLEXERIL) 10 MG tablet, Take 1 tablet by mouth nightly, Disp: 90 tablet, Rfl: 0    amantadine (SYMMETREL) 100 MG capsule, Take 1 capsule by mouth 2 times daily, Disp: 180 capsule, Rfl: 3    loratadine (CLARITIN) 10 MG tablet, Take 1 tablet by mouth daily, Disp: 90 tablet, Rfl: 3    fluticasone (FLONASE) 50 MCG/ACT nasal spray, 2 sprays by Each Nostril route daily, Disp: 3 each, Rfl: 3    gabapentin (NEURONTIN) 300 MG capsule, Take 1 capsule by mouth once daily, Disp: 90 capsule, Rfl: 1    baclofen (LIORESAL) 10 MG tablet, TAKE 1 TABLET BY MOUTH THREE TIMES DAILY, Disp: 270 tablet, Rfl: 2    Ocrelizumab (OCREVUS IV), Infuse 300 mg intravenously, Disp: , Rfl:     zinc 50 MG CAPS, Take 50 mg by mouth daily, Disp: , Rfl:     Ascorbic Acid (VITAMIN C ER PO), Take by mouth, Disp: , Rfl:     Handicap Placard [facility], Exp. 2025, Disp: 1 each, Rfl: 0    acetaminophen (TYLENOL) 500 MG tablet, Take 2 tablets by mouth every 4 hours as needed, Disp: , Rfl:     therapeutic multivitamin-minerals (THERAGRAN-M) tablet, Take 1 tablet by mouth daily, Disp: , Rfl:     Cholecalciferol (VITAMIN D-3 PO), Take  by mouth 2 times daily. , Disp: , Rfl:     calcium carbonate-vitamin D 600-200 MG-UNIT TABS, Take  by mouth 2 times daily. , Disp: , Rfl:     I reviewed the past medical history, allergies, medications, social history and family history. PE:   Vitals:    08/04/23 1146   BP: (!) 136/90   Pulse: 76   Resp: 16   SpO2: 98%   Weight: 171 lb (77.6 kg)   Height: 5' 3\" (1.6 m)     General Appearance:  alert and cooperative  Gen: NAD, Language is Intact. Skin: no rash, lesion, dry  to touch.

## 2023-08-04 NOTE — PATIENT INSTRUCTIONS
Continue Ocrevus infusions 01/17/2024. CBC, hepatic panel  to be done 9/2023  Continue with Amantadine 100 mg two times a day. Refills given   Take calcium and vitamin D supplements  Stay physically active  Report any new symptoms  Follow up in 6 months or sooner if needed. Call if any questions or concerns.

## 2023-09-03 DIAGNOSIS — G35 MULTIPLE SCLEROSIS (HCC): ICD-10-CM

## 2023-09-05 RX ORDER — GABAPENTIN 300 MG/1
CAPSULE ORAL
Qty: 90 CAPSULE | Refills: 1 | Status: SHIPPED | OUTPATIENT
Start: 2023-09-05 | End: 2024-03-03

## 2023-09-17 DIAGNOSIS — G35 MULTIPLE SCLEROSIS (HCC): ICD-10-CM

## 2023-09-18 RX ORDER — CYCLOBENZAPRINE HCL 10 MG
10 TABLET ORAL NIGHTLY
Qty: 90 TABLET | Refills: 1 | Status: SHIPPED | OUTPATIENT
Start: 2023-09-18

## 2023-09-27 ENCOUNTER — NURSE ONLY (OUTPATIENT)
Dept: LAB | Age: 63
End: 2023-09-27

## 2023-09-27 DIAGNOSIS — R42 VERTIGO: ICD-10-CM

## 2023-09-27 DIAGNOSIS — R26.0 ATAXIC GAIT: ICD-10-CM

## 2023-09-27 DIAGNOSIS — G35 MULTIPLE SCLEROSIS (HCC): ICD-10-CM

## 2023-09-27 LAB
ALBUMIN SERPL BCG-MCNC: 4 G/DL (ref 3.5–5.1)
ALP SERPL-CCNC: 108 U/L (ref 38–126)
ALT SERPL W/O P-5'-P-CCNC: 15 U/L (ref 11–66)
AST SERPL-CCNC: 18 U/L (ref 5–40)
BASOPHILS ABSOLUTE: 0.1 THOU/MM3 (ref 0–0.1)
BASOPHILS NFR BLD AUTO: 0.8 %
BILIRUB CONJ SERPL-MCNC: < 0.2 MG/DL (ref 0–0.3)
BILIRUB SERPL-MCNC: 0.3 MG/DL (ref 0.3–1.2)
DEPRECATED RDW RBC AUTO: 46.6 FL (ref 35–45)
EOSINOPHIL NFR BLD AUTO: 5.5 %
EOSINOPHILS ABSOLUTE: 0.4 THOU/MM3 (ref 0–0.4)
ERYTHROCYTE [DISTWIDTH] IN BLOOD BY AUTOMATED COUNT: 13.7 % (ref 11.5–14.5)
HCT VFR BLD AUTO: 39.3 % (ref 37–47)
HGB BLD-MCNC: 12.5 GM/DL (ref 12–16)
IMM GRANULOCYTES # BLD AUTO: 0.02 THOU/MM3 (ref 0–0.07)
IMM GRANULOCYTES NFR BLD AUTO: 0.3 %
LYMPHOCYTES ABSOLUTE: 1.8 THOU/MM3 (ref 1–4.8)
LYMPHOCYTES NFR BLD AUTO: 25.6 %
MCH RBC QN AUTO: 29.6 PG (ref 26–33)
MCHC RBC AUTO-ENTMCNC: 31.8 GM/DL (ref 32.2–35.5)
MCV RBC AUTO: 93.1 FL (ref 81–99)
MONOCYTES ABSOLUTE: 0.8 THOU/MM3 (ref 0.4–1.3)
MONOCYTES NFR BLD AUTO: 11.6 %
NEUTROPHILS NFR BLD AUTO: 56.2 %
NRBC BLD AUTO-RTO: 0 /100 WBC
PLATELET # BLD AUTO: 296 THOU/MM3 (ref 130–400)
PMV BLD AUTO: 9.6 FL (ref 9.4–12.4)
PROT SERPL-MCNC: 6.4 G/DL (ref 6.1–8)
RBC # BLD AUTO: 4.22 MILL/MM3 (ref 4.2–5.4)
SEGMENTED NEUTROPHILS ABSOLUTE COUNT: 4 THOU/MM3 (ref 1.8–7.7)
WBC # BLD AUTO: 7.2 THOU/MM3 (ref 4.8–10.8)

## 2023-10-23 NOTE — PROGRESS NOTES
[address] [address]  Dept: [phone]  Dept Fax: [phone]  Loc: [address] is a 61 y.o. female who presents today for:  Chief Complaint   Patient presents with    Annual Exam       HPI:     HPI    Here for yearly checkup. Seeing neurology for infusions for MS. Hypertension under treatment with medications      Arthritis is controlled with OTC prn. Seasonal allergies are increasing. On flonase but discussed claritin daily. Left lower leg redness for a month and in the past week it is more warm and hot to the touch and now there is a hard area over the distal 4th metatarsal.        Reviewed chart forpast medical history , surgical history , allergies, social history , family history and medications. Health Maintenance   Topic Date Due    HIV screen  Never done    Hepatitis C screen  Never done    Shingles vaccine (1 of 2) Never done    Breast cancer screen  10/19/2023    Depression Screen  03/07/2024    Colorectal Cancer Screen  06/11/2025    Lipids  03/22/2028    DTaP/Tdap/Td vaccine (2 - Td or Tdap) 10/22/2031    Flu vaccine  Completed    COVID-19 Vaccine  Completed    Hepatitis A vaccine  Aged Out    Hepatitis B vaccine  Aged Out    Hib vaccine  Aged Out    Meningococcal (ACWY) vaccine  Aged Out    Pneumococcal 0-64 years Vaccine  Aged Out       Subjective:      Constitutional:Negative for fever, chills, diaphoresis, activity change, appetite change and fatigue. HENT: Negative for hearing loss, ear pain, congestion, sore throat, rhinorrhea, postnasal drip and ear discharge. Eyes: Negative for photophobia and visual disturbance. Respiratory: Negative for cough, chest tightness, shortness of breath and wheezing. Cardiovascular: Negative for chest pain and leg swelling. Gastrointestinal: Negative for nausea, vomiting, abdominal pain, diarrhea and constipation.

## 2023-10-24 ENCOUNTER — TELEPHONE (OUTPATIENT)
Dept: FAMILY MEDICINE CLINIC | Age: 63
End: 2023-10-24

## 2023-10-24 ENCOUNTER — OFFICE VISIT (OUTPATIENT)
Dept: FAMILY MEDICINE CLINIC | Age: 63
End: 2023-10-24
Payer: COMMERCIAL

## 2023-10-24 ENCOUNTER — HOSPITAL ENCOUNTER (OUTPATIENT)
Dept: ULTRASOUND IMAGING | Age: 63
Discharge: HOME | End: 2023-10-24
Attending: FAMILY MEDICINE
Payer: COMMERCIAL

## 2023-10-24 ENCOUNTER — HOSPITAL ENCOUNTER (OUTPATIENT)
Dept: MAMMOGRAPHY | Age: 63
Discharge: HOME | End: 2023-10-24
Payer: COMMERCIAL

## 2023-10-24 VITALS
DIASTOLIC BLOOD PRESSURE: 72 MMHG | HEART RATE: 87 BPM | OXYGEN SATURATION: 98 % | TEMPERATURE: 97.2 F | BODY MASS INDEX: 29.95 KG/M2 | RESPIRATION RATE: 16 BRPM | SYSTOLIC BLOOD PRESSURE: 116 MMHG | WEIGHT: 169 LBS | HEIGHT: 63 IN

## 2023-10-24 DIAGNOSIS — M79.662 PAIN AND SWELLING OF LEFT LOWER LEG: ICD-10-CM

## 2023-10-24 DIAGNOSIS — L03.116 CELLULITIS OF LEFT LOWER EXTREMITY: ICD-10-CM

## 2023-10-24 DIAGNOSIS — I10 ESSENTIAL HYPERTENSION: ICD-10-CM

## 2023-10-24 DIAGNOSIS — L50.9 URTICARIAL RASH: ICD-10-CM

## 2023-10-24 DIAGNOSIS — Z00.00 ROUTINE GENERAL MEDICAL EXAMINATION AT A HEALTH CARE FACILITY: Primary | ICD-10-CM

## 2023-10-24 DIAGNOSIS — M79.89 PAIN AND SWELLING OF LEFT LOWER LEG: ICD-10-CM

## 2023-10-24 DIAGNOSIS — M15.9 PRIMARY OSTEOARTHRITIS INVOLVING MULTIPLE JOINTS: ICD-10-CM

## 2023-10-24 DIAGNOSIS — Z12.39 SCREENING BREAST EXAMINATION: ICD-10-CM

## 2023-10-24 DIAGNOSIS — G35 MULTIPLE SCLEROSIS (HCC): ICD-10-CM

## 2023-10-24 DIAGNOSIS — R42 VERTIGO: ICD-10-CM

## 2023-10-24 PROCEDURE — 3078F DIAST BP <80 MM HG: CPT | Performed by: FAMILY MEDICINE

## 2023-10-24 PROCEDURE — 3074F SYST BP LT 130 MM HG: CPT | Performed by: FAMILY MEDICINE

## 2023-10-24 PROCEDURE — 99396 PREV VISIT EST AGE 40-64: CPT | Performed by: FAMILY MEDICINE

## 2023-10-24 PROCEDURE — 77063 BREAST TOMOSYNTHESIS BI: CPT

## 2023-10-24 PROCEDURE — 93971 EXTREMITY STUDY: CPT

## 2023-10-24 RX ORDER — SULFAMETHOXAZOLE AND TRIMETHOPRIM 800; 160 MG/1; MG/1
1 TABLET ORAL 2 TIMES DAILY
Qty: 20 TABLET | Refills: 0 | Status: SHIPPED | OUTPATIENT
Start: 2023-10-24 | End: 2023-11-03

## 2023-10-24 RX ORDER — LOSARTAN POTASSIUM AND HYDROCHLOROTHIAZIDE 12.5; 5 MG/1; MG/1
1 TABLET ORAL DAILY
Qty: 90 TABLET | Refills: 3 | Status: SHIPPED | OUTPATIENT
Start: 2023-10-24

## 2023-11-07 ENCOUNTER — OFFICE VISIT (OUTPATIENT)
Dept: FAMILY MEDICINE CLINIC | Age: 63
End: 2023-11-07
Payer: COMMERCIAL

## 2023-11-07 VITALS
BODY MASS INDEX: 29.41 KG/M2 | SYSTOLIC BLOOD PRESSURE: 134 MMHG | HEIGHT: 63 IN | OXYGEN SATURATION: 100 % | DIASTOLIC BLOOD PRESSURE: 86 MMHG | HEART RATE: 90 BPM | RESPIRATION RATE: 16 BRPM | WEIGHT: 166.01 LBS | TEMPERATURE: 97.1 F

## 2023-11-07 DIAGNOSIS — L03.116 CELLULITIS OF LEFT LOWER EXTREMITY: Primary | ICD-10-CM

## 2023-11-07 DIAGNOSIS — R22.42 MASS OF LEFT FOOT: ICD-10-CM

## 2023-11-07 PROCEDURE — 99213 OFFICE O/P EST LOW 20 MIN: CPT | Performed by: FAMILY MEDICINE

## 2023-11-07 NOTE — PROGRESS NOTES
[facility]  [address] [facility]  Dept: [phone]  Dept Fax: [phone]  Loc: [address] is a 61 y.o. female who presents today for:  Chief Complaint   Patient presents with    2 Week Follow-Up       HPI:     HPI  Here for recheck of left foot. Left lower leg redness for a month and in the past 3 weeks it is more warm and hot to the touch and now there is a hard area over the distal 4th metatarsal.  2 weeks ago she was started on bactrim. Ultrasound was negative for clots. Pain is better and less red but still very tender to touch the leg. Reviewed chart forpast medical history , surgical history , allergies, social history , family history and medications. Health Maintenance   Topic Date Due    HIV screen  Never done    Hepatitis C screen  Never done    Shingles vaccine (1 of 2) Never done    COVID-19 Vaccine (4 - 2023-24 season) 11/01/2024 (Originally 9/1/2023)    Depression Screen  03/07/2024    Breast cancer screen  10/24/2024    Colorectal Cancer Screen  06/11/2025    Lipids  03/22/2028    DTaP/Tdap/Td vaccine (2 - Td or Tdap) 10/22/2031    Flu vaccine  Completed    Hepatitis A vaccine  Aged Out    Hepatitis B vaccine  Aged Out    Hib vaccine  Aged Out    Meningococcal (ACWY) vaccine  Aged Out    Pneumococcal 0-64 years Vaccine  Aged Out       Subjective:      Constitutional:Negative for fever, chills, diaphoresis, activity change, appetite change and fatigue. HENT: Negative for hearing loss, ear pain, congestion, sore throat, rhinorrhea, postnasal drip and ear discharge. Eyes: Negative for photophobia and visual disturbance. Respiratory: Negative for cough, chest tightness, shortness of breath and wheezing. Cardiovascular: Negative for chest pain and leg swelling. Gastrointestinal: Negative for nausea, vomiting, abdominal pain, diarrhea and constipation.    Genitourinary:

## 2024-01-16 ENCOUNTER — HOSPITAL ENCOUNTER (OUTPATIENT)
Dept: NURSING | Age: 64
Discharge: HOME | End: 2024-01-16
Payer: COMMERCIAL

## 2024-01-16 VITALS
DIASTOLIC BLOOD PRESSURE: 91 MMHG | RESPIRATION RATE: 18 BRPM | SYSTOLIC BLOOD PRESSURE: 140 MMHG | WEIGHT: 165 LBS | BODY MASS INDEX: 29.24 KG/M2

## 2024-01-16 DIAGNOSIS — G35 MULTIPLE SCLEROSIS, RELAPSING-REMITTING (HCC): Primary | ICD-10-CM

## 2024-01-16 PROCEDURE — 96365 THER/PROPH/DIAG IV INF INIT: CPT

## 2024-01-16 PROCEDURE — 96366 THER/PROPH/DIAG IV INF ADDON: CPT

## 2024-01-16 PROCEDURE — 6370000000 HC RX 637 (ALT 250 FOR IP): Performed by: NURSE PRACTITIONER

## 2024-01-16 PROCEDURE — 6360000002 HC RX W HCPCS: Performed by: NURSE PRACTITIONER

## 2024-01-16 PROCEDURE — 2580000003 HC RX 258: Performed by: NURSE PRACTITIONER

## 2024-01-16 RX ORDER — SODIUM CHLORIDE 9 MG/ML
5-250 INJECTION, SOLUTION INTRAVENOUS PRN
OUTPATIENT
Start: 2024-07-07

## 2024-01-16 RX ORDER — DIPHENHYDRAMINE HYDROCHLORIDE 50 MG/ML
50 INJECTION INTRAMUSCULAR; INTRAVENOUS ONCE
Status: COMPLETED | OUTPATIENT
Start: 2024-01-16 | End: 2024-01-16

## 2024-01-16 RX ORDER — DIPHENHYDRAMINE HYDROCHLORIDE 50 MG/ML
50 INJECTION INTRAMUSCULAR; INTRAVENOUS ONCE
OUTPATIENT
Start: 2024-07-07 | End: 2024-07-07

## 2024-01-16 RX ORDER — ACETAMINOPHEN 325 MG/1
650 TABLET ORAL
OUTPATIENT
Start: 2024-07-07

## 2024-01-16 RX ORDER — ALBUTEROL SULFATE 90 UG/1
4 AEROSOL, METERED RESPIRATORY (INHALATION) PRN
OUTPATIENT
Start: 2024-07-07

## 2024-01-16 RX ORDER — SODIUM CHLORIDE 0.9 % (FLUSH) 0.9 %
5-40 SYRINGE (ML) INJECTION PRN
OUTPATIENT
Start: 2024-07-07

## 2024-01-16 RX ORDER — SODIUM CHLORIDE 9 MG/ML
INJECTION, SOLUTION INTRAVENOUS CONTINUOUS
OUTPATIENT
Start: 2024-07-07

## 2024-01-16 RX ORDER — ACETAMINOPHEN 325 MG/1
650 TABLET ORAL ONCE
Status: COMPLETED | OUTPATIENT
Start: 2024-01-16 | End: 2024-01-16

## 2024-01-16 RX ORDER — SODIUM CHLORIDE 9 MG/ML
5-250 INJECTION, SOLUTION INTRAVENOUS PRN
Status: DISCONTINUED | OUTPATIENT
Start: 2024-01-16 | End: 2024-01-17 | Stop reason: HOSPADM

## 2024-01-16 RX ORDER — HEPARIN 100 UNIT/ML
500 SYRINGE INTRAVENOUS PRN
OUTPATIENT
Start: 2024-07-07

## 2024-01-16 RX ORDER — DIPHENHYDRAMINE HYDROCHLORIDE 50 MG/ML
50 INJECTION INTRAMUSCULAR; INTRAVENOUS
OUTPATIENT
Start: 2024-07-07

## 2024-01-16 RX ORDER — EPINEPHRINE 1 MG/ML
0.3 INJECTION, SOLUTION INTRAMUSCULAR; SUBCUTANEOUS PRN
OUTPATIENT
Start: 2024-07-07

## 2024-01-16 RX ORDER — ONDANSETRON 2 MG/ML
8 INJECTION INTRAMUSCULAR; INTRAVENOUS
OUTPATIENT
Start: 2024-07-07

## 2024-01-16 RX ORDER — ACETAMINOPHEN 325 MG/1
650 TABLET ORAL ONCE
OUTPATIENT
Start: 2024-07-07 | End: 2024-07-07

## 2024-01-16 RX ADMIN — ACETAMINOPHEN 650 MG: 325 TABLET ORAL at 08:38

## 2024-01-16 RX ADMIN — OCRELIZUMAB 600 MG: 300 INJECTION INTRAVENOUS at 09:48

## 2024-01-16 RX ADMIN — SODIUM CHLORIDE 20 ML/HR: 9 INJECTION, SOLUTION INTRAVENOUS at 08:47

## 2024-01-16 RX ADMIN — DIPHENHYDRAMINE HYDROCHLORIDE 50 MG: 50 INJECTION INTRAMUSCULAR; INTRAVENOUS at 08:50

## 2024-01-16 RX ADMIN — METHYLPREDNISOLONE SODIUM SUCCINATE 125 MG: 125 INJECTION, POWDER, FOR SOLUTION INTRAMUSCULAR; INTRAVENOUS at 08:48

## 2024-01-16 NOTE — PROGRESS NOTES
0830 pt arrives ambulatory with spouse for orevus infusion. Infusion explained and questions answered.  PT RIGHTS AND RESPONSIBILITIES OFFERED TO PT. Pt denies recent infection or ATB use.   1000 infusion continues. Pt denies needs.   1100 infusion continues. Pt resting quietly.   1210 infusion complete. Pt tolerated it well with no complaints. Vitals stable.   1310 pt discharged ambulatory with instructions with no complaints.                   __m__ Safety:       (Environmental)  [place] to environment  Ensure ID band is correct and in place/ allergy band as needed  Assess for fall risk  Initiate fall precautions as applicable (fall band, side rails, etc.)  Call light within reach  Bed in low position/ wheels locked    __m__ Pain:       Assess pain level and characteristics  Administer analgesics as ordered  Assess effectiveness of pain management and report to MD as needed    ___m_ Knowledge Deficit:  Assess baseline knowledge  Provide teaching at level of understanding  Provide teaching via preferred learning method  Evaluate teaching effectiveness    _m___ Hemodynamic/Respiratory Status:       (Pre and Post Procedure Monitoring)  Assess/Monitor vital signs and LOC  Assess Baseline SpO2 prior to any sedation  Obtain weight/height  Assess vital signs/ LOC until patient meets discharge criteria  Monitor procedure site and notify MD of any issues

## 2024-01-16 NOTE — DISCHARGE INSTRUCTIONS
Ocrevus Discharge Instructions    Can not have an active infection or be on an antibiotic at time of infusion.     Side effects: headache, fatigue, skin itchiness.     Report any unusual diarrhea or severe abdominal pain to your neurologist.     Report any new or worsening neurological signs that have lasted several days such as: problems with thinking, eyesight, strength, and balance.     Next Ocrevus infusion: Monday June 17th, 2024 at 8:30am    Please call [phone] with any questions or concerns.

## 2024-01-19 ENCOUNTER — HOSPITAL ENCOUNTER (EMERGENCY)
Age: 64
Discharge: HOME | End: 2024-01-19
Attending: EMERGENCY MEDICINE
Payer: COMMERCIAL

## 2024-01-19 ENCOUNTER — APPOINTMENT (OUTPATIENT)
Dept: GENERAL RADIOLOGY | Age: 64
End: 2024-01-19
Attending: EMERGENCY MEDICINE
Payer: COMMERCIAL

## 2024-01-19 VITALS
WEIGHT: 168 LBS | TEMPERATURE: 98 F | HEART RATE: 103 BPM | BODY MASS INDEX: 29.77 KG/M2 | HEIGHT: 63 IN | RESPIRATION RATE: 17 BRPM | SYSTOLIC BLOOD PRESSURE: 137 MMHG | OXYGEN SATURATION: 99 % | DIASTOLIC BLOOD PRESSURE: 72 MMHG

## 2024-01-19 DIAGNOSIS — M19.031 OSTEOARTHRITIS OF RIGHT WRIST, UNSPECIFIED OSTEOARTHRITIS TYPE: ICD-10-CM

## 2024-01-19 DIAGNOSIS — M25.431 EFFUSION OF JOINT OF RIGHT WRIST: Primary | ICD-10-CM

## 2024-01-19 PROCEDURE — 99283 EMERGENCY DEPT VISIT LOW MDM: CPT

## 2024-01-19 PROCEDURE — 73110 X-RAY EXAM OF WRIST: CPT

## 2024-01-19 RX ORDER — PREDNISONE 20 MG/1
20 TABLET ORAL 2 TIMES DAILY
Qty: 10 TABLET | Refills: 0 | Status: SHIPPED | OUTPATIENT
Start: 2024-01-19 | End: 2024-01-24

## 2024-01-19 ASSESSMENT — PAIN - FUNCTIONAL ASSESSMENT
PAIN_FUNCTIONAL_ASSESSMENT: PREVENTS OR INTERFERES SOME ACTIVE ACTIVITIES AND ADLS
PAIN_FUNCTIONAL_ASSESSMENT: 0-10

## 2024-01-19 ASSESSMENT — PAIN DESCRIPTION - FREQUENCY: FREQUENCY: CONTINUOUS

## 2024-01-19 ASSESSMENT — PAIN DESCRIPTION - PAIN TYPE: TYPE: ACUTE PAIN

## 2024-01-19 ASSESSMENT — PAIN DESCRIPTION - ORIENTATION: ORIENTATION: RIGHT

## 2024-01-19 ASSESSMENT — PAIN DESCRIPTION - LOCATION: LOCATION: WRIST

## 2024-01-19 ASSESSMENT — PAIN SCALES - GENERAL: PAINLEVEL_OUTOF10: 6

## 2024-01-19 ASSESSMENT — PAIN DESCRIPTION - DESCRIPTORS: DESCRIPTORS: ACHING;DULL

## 2024-01-19 NOTE — DISCHARGE INSTRUCTIONS
Wear a wrist splint as needed for comfort and protection.  Gentle range of motion, no heavy lifting, pushing, pulling with the right hand.  Prednisone as prescribed.  Tylenol as needed for pain.  Follow-up with the hand specialist if symptoms or not improving.

## 2024-01-19 NOTE — ED NOTES
Patient presents to the ED via private auto with  with complaints of right wrist pain that she noticed when she woke up this morning.  Denies known injury to the area but states that her wrist \"feels like it is jammed.\"  Radial pulse strong.

## 2024-01-19 NOTE — ED NOTES
AVS printed and reviewed.  Patient able to ambulate out unaided to private vehicle with all belongings.  Refuses splint from [facility] will \"pick one up from work.\"

## 2024-01-20 NOTE — ED PROVIDER NOTES
[facility]  eMERGENCY dEPARTMENT eNCOUnter             [facility] CARE [place]    CHIEF COMPLAINT    Chief Complaint   Patient presents with    Wrist Pain     right       Nurses Notes reviewed and I agree except as noted in the HPI.    HPI    Valerie Carmona is a 63 y.o. female who presents stating that when she got up today she noticed that she had pain in her right wrist, radial aspect, with no known injury.  The area is swollen and a little bit warm.  She cannot remember anything happening to it.  She does have a history of arthritis and MS.  She does a job that requires some lifting, but no heavy lifting.  She cannot remember any falls or blows.    REVIEW OF SYSTEMS      Review of Systems   Constitutional: Negative.  Negative for fever.   Musculoskeletal:  Negative for falls.   Neurological:  Negative for sensory change.   All other systems reviewed and are negative.        PAST MEDICAL HISTORY     has a past medical history of Arthritis, Double vision, Hypertension, and Multiple sclerosis (HCC).    SURGICAL HISTORY     has a past surgical history that includes laparoscopy (Left, ); Foot surgery (Right, 2014);  section; Hysterectomy, total abdominal (??); and Knee Arthroplasty (Left, 2021).    CURRENT MEDICATIONS    Discharge Medication List as of 2024 12:55 PM        CONTINUE these medications which have NOT CHANGED    Details   baclofen (LIORESAL) 10 MG tablet TAKE 1 TABLET BY MOUTH THREE TIMES DAILY, Disp-270 tablet, R-2Normal      losartan-hydroCHLOROthiazide (HYZAAR) 50-12.5 MG per tablet Take 1 tablet by mouth daily, Disp-90 tablet, R-3Normal      cyclobenzaprine (FLEXERIL) 10 MG tablet Take 1 tablet by mouth nightly, Disp-90 tablet, R-1Normal      gabapentin (NEURONTIN) 300 MG capsule Take 1 capsule by mouth once daily, Disp-90 capsule, R-1Normal      amantadine (SYMMETREL) 100 MG capsule Take 1 capsule by mouth 2 times daily, Disp-180 capsule,

## 2024-02-09 ENCOUNTER — OFFICE VISIT (OUTPATIENT)
Dept: NEUROLOGY | Age: 64
End: 2024-02-09
Payer: COMMERCIAL

## 2024-02-09 VITALS
OXYGEN SATURATION: 98 % | SYSTOLIC BLOOD PRESSURE: 130 MMHG | WEIGHT: 171 LBS | HEIGHT: 63 IN | BODY MASS INDEX: 30.3 KG/M2 | HEART RATE: 76 BPM | DIASTOLIC BLOOD PRESSURE: 80 MMHG

## 2024-02-09 DIAGNOSIS — R26.0 ATAXIC GAIT: ICD-10-CM

## 2024-02-09 DIAGNOSIS — G35 MULTIPLE SCLEROSIS (HCC): Primary | ICD-10-CM

## 2024-02-09 PROCEDURE — 99213 OFFICE O/P EST LOW 20 MIN: CPT | Performed by: NURSE PRACTITIONER

## 2024-02-09 RX ORDER — AMANTADINE HYDROCHLORIDE 100 MG/1
100 CAPSULE, GELATIN COATED ORAL 2 TIMES DAILY
Qty: 180 CAPSULE | Refills: 3 | Status: SHIPPED | OUTPATIENT
Start: 2024-02-09

## 2024-02-09 NOTE — PROGRESS NOTES
NEUROLOGY OUT PATIENT FOLLOW UP NOTE:  2/9/202411:59 AM    Valerie Carmona is here for follow up for multiple sclerosis.         No Known Allergies    Current Outpatient Medications:     baclofen (LIORESAL) 10 MG tablet, TAKE 1 TABLET BY MOUTH THREE TIMES DAILY, Disp: 270 tablet, Rfl: 2    losartan-hydroCHLOROthiazide (HYZAAR) 50-12.5 MG per tablet, Take 1 tablet by mouth daily, Disp: 90 tablet, Rfl: 3    cyclobenzaprine (FLEXERIL) 10 MG tablet, Take 1 tablet by mouth nightly, Disp: 90 tablet, Rfl: 1    gabapentin (NEURONTIN) 300 MG capsule, Take 1 capsule by mouth once daily, Disp: 90 capsule, Rfl: 1    amantadine (SYMMETREL) 100 MG capsule, Take 1 capsule by mouth 2 times daily, Disp: 180 capsule, Rfl: 3    loratadine (CLARITIN) 10 MG tablet, Take 1 tablet by mouth daily, Disp: 90 tablet, Rfl: 3    fluticasone (FLONASE) 50 MCG/ACT nasal spray, 2 sprays by Each Nostril route daily, Disp: 3 each, Rfl: 3    Ocrelizumab (OCREVUS IV), Infuse 300 mg intravenously, Disp: , Rfl:     zinc 50 MG CAPS, Take 50 mg by mouth daily, Disp: , Rfl:     Ascorbic Acid (VITAMIN C ER PO), Take by mouth, Disp: , Rfl:     Handicap Placard [facility], Exp. 2025, Disp: 1 each, Rfl: 0    acetaminophen (TYLENOL) 500 MG tablet, Take 2 tablets by mouth every 4 hours as needed, Disp: , Rfl:     therapeutic multivitamin-minerals (THERAGRAN-M) tablet, Take 1 tablet by mouth daily, Disp: , Rfl:     Cholecalciferol (VITAMIN D-3 PO), Take  by mouth 2 times daily., Disp: , Rfl:     calcium carbonate-vitamin D 600-200 MG-UNIT TABS, Take  by mouth 2 times daily., Disp: , Rfl:     I reviewed the past medical history, allergies, medications, social history and family history.       PE:   Vitals:    02/09/24 1146   BP: 130/80   Site: Left Upper Arm   Position: Sitting   Cuff Size: Medium Adult   Pulse: 76   SpO2: 98%   Weight: 77.6 kg (171 lb)   Height: 1.6 m (5' 3\")          General Appearance:  alert and cooperative  Gen: NAD, Language is Intact. Skin: no rash,

## 2024-02-09 NOTE — PATIENT INSTRUCTIONS
Continue Ocrevus infusions     CBC, hepatic panel  to be done  3/2024  Continue with Amantadine 100 mg two times a day. Refills given   Take calcium and vitamin D supplements  Stay physically active  Report any new symptoms  Follow up in 6 months or sooner if needed.  Call if any questions or concerns.

## 2024-03-04 DIAGNOSIS — G35 MULTIPLE SCLEROSIS (HCC): ICD-10-CM

## 2024-03-04 RX ORDER — GABAPENTIN 300 MG/1
CAPSULE ORAL
Qty: 90 CAPSULE | Refills: 1 | Status: SHIPPED | OUTPATIENT
Start: 2024-03-04 | End: 2024-08-31

## 2024-03-04 RX ORDER — CYCLOBENZAPRINE HCL 10 MG
10 TABLET ORAL NIGHTLY
Qty: 90 TABLET | Refills: 1 | Status: SHIPPED | OUTPATIENT
Start: 2024-03-04

## 2024-03-21 ENCOUNTER — NURSE ONLY (OUTPATIENT)
Dept: LAB | Age: 64
End: 2024-03-21

## 2024-03-21 DIAGNOSIS — Z00.00 ROUTINE GENERAL MEDICAL EXAMINATION AT A HEALTH CARE FACILITY: ICD-10-CM

## 2024-03-21 DIAGNOSIS — G35 MULTIPLE SCLEROSIS (HCC): ICD-10-CM

## 2024-03-21 DIAGNOSIS — R26.0 ATAXIC GAIT: ICD-10-CM

## 2024-03-21 LAB
BASOPHILS ABSOLUTE: 0.1 THOU/MM3 (ref 0–0.1)
BASOPHILS NFR BLD AUTO: 0.7 %
DEPRECATED RDW RBC AUTO: 46.1 FL (ref 35–45)
EOSINOPHIL NFR BLD AUTO: 5.9 %
EOSINOPHILS ABSOLUTE: 0.4 THOU/MM3 (ref 0–0.4)
ERYTHROCYTE [DISTWIDTH] IN BLOOD BY AUTOMATED COUNT: 13.6 % (ref 11.5–14.5)
HCT VFR BLD AUTO: 41.8 % (ref 37–47)
HGB BLD-MCNC: 13.6 GM/DL (ref 12–16)
IMM GRANULOCYTES # BLD AUTO: 0.01 THOU/MM3 (ref 0–0.07)
IMM GRANULOCYTES NFR BLD AUTO: 0.1 %
LYMPHOCYTES ABSOLUTE: 2.5 THOU/MM3 (ref 1–4.8)
LYMPHOCYTES NFR BLD AUTO: 34.7 %
MCH RBC QN AUTO: 30 PG (ref 26–33)
MCHC RBC AUTO-ENTMCNC: 32.5 GM/DL (ref 32.2–35.5)
MCV RBC AUTO: 92.1 FL (ref 81–99)
MONOCYTES ABSOLUTE: 0.7 THOU/MM3 (ref 0.4–1.3)
MONOCYTES NFR BLD AUTO: 9.7 %
NEUTROPHILS NFR BLD AUTO: 48.9 %
NRBC BLD AUTO-RTO: 0 /100 WBC
PLATELET # BLD AUTO: 332 THOU/MM3 (ref 130–400)
PMV BLD AUTO: 9.4 FL (ref 9.4–12.4)
RBC # BLD AUTO: 4.54 MILL/MM3 (ref 4.2–5.4)
SEGMENTED NEUTROPHILS ABSOLUTE COUNT: 3.5 THOU/MM3 (ref 1.8–7.7)
WBC # BLD AUTO: 7.2 THOU/MM3 (ref 4.8–10.8)

## 2024-03-22 LAB
ALBUMIN SERPL BCG-MCNC: 4.2 G/DL (ref 3.5–5.1)
ALP SERPL-CCNC: 115 U/L (ref 38–126)
ALT SERPL W/O P-5'-P-CCNC: 17 U/L (ref 11–66)
ANION GAP SERPL CALC-SCNC: 10 MEQ/L (ref 8–16)
AST SERPL-CCNC: 19 U/L (ref 5–40)
BILIRUB CONJ SERPL-MCNC: < 0.2 MG/DL (ref 0–0.3)
BILIRUB SERPL-MCNC: 0.3 MG/DL (ref 0.3–1.2)
BUN SERPL-MCNC: 19 MG/DL (ref 7–22)
CALCIUM SERPL-MCNC: 9.7 MG/DL (ref 8.5–10.5)
CHLORIDE SERPL-SCNC: 102 MEQ/L (ref 98–111)
CHOLEST SERPL-MCNC: 188 MG/DL (ref 100–199)
CO2 SERPL-SCNC: 28 MEQ/L (ref 23–33)
CREAT SERPL-MCNC: 0.9 MG/DL (ref 0.4–1.2)
GFR SERPL CREATININE-BSD FRML MDRD: > 60 ML/MIN/1.73M2
GLUCOSE FASTING: 85 MG/DL (ref 70–108)
HDLC SERPL-MCNC: 86 MG/DL
LDLC SERPL CALC-MCNC: 91 MG/DL
POTASSIUM SERPL-SCNC: 3.9 MEQ/L (ref 3.5–5.2)
PROT SERPL-MCNC: 7 G/DL (ref 6.1–8)
SODIUM SERPL-SCNC: 140 MEQ/L (ref 135–145)
TRIGL SERPL-MCNC: 54 MG/DL (ref 0–199)
TSH SERPL DL<=0.005 MIU/L-ACNC: 1.11 UIU/ML (ref 0.4–4.2)

## 2024-04-30 ENCOUNTER — OFFICE VISIT (OUTPATIENT)
Dept: FAMILY MEDICINE CLINIC | Age: 64
End: 2024-04-30
Payer: COMMERCIAL

## 2024-04-30 VITALS
WEIGHT: 172.84 LBS | HEART RATE: 96 BPM | RESPIRATION RATE: 16 BRPM | SYSTOLIC BLOOD PRESSURE: 124 MMHG | HEIGHT: 63 IN | BODY MASS INDEX: 30.62 KG/M2 | DIASTOLIC BLOOD PRESSURE: 86 MMHG | TEMPERATURE: 97.7 F | OXYGEN SATURATION: 100 %

## 2024-04-30 DIAGNOSIS — J01.90 ACUTE BACTERIAL SINUSITIS: ICD-10-CM

## 2024-04-30 DIAGNOSIS — G35 MULTIPLE SCLEROSIS (HCC): Primary | ICD-10-CM

## 2024-04-30 DIAGNOSIS — M15.9 PRIMARY OSTEOARTHRITIS INVOLVING MULTIPLE JOINTS: ICD-10-CM

## 2024-04-30 DIAGNOSIS — R42 VERTIGO: ICD-10-CM

## 2024-04-30 DIAGNOSIS — B96.89 ACUTE BACTERIAL SINUSITIS: ICD-10-CM

## 2024-04-30 DIAGNOSIS — D18.09 HEMANGIOMA OF CHEST WALL: ICD-10-CM

## 2024-04-30 DIAGNOSIS — I10 ESSENTIAL HYPERTENSION: ICD-10-CM

## 2024-04-30 PROCEDURE — 99214 OFFICE O/P EST MOD 30 MIN: CPT | Performed by: FAMILY MEDICINE

## 2024-04-30 PROCEDURE — 3074F SYST BP LT 130 MM HG: CPT | Performed by: FAMILY MEDICINE

## 2024-04-30 PROCEDURE — 3079F DIAST BP 80-89 MM HG: CPT | Performed by: FAMILY MEDICINE

## 2024-04-30 RX ORDER — TRIAMCINOLONE ACETONIDE 0.25 MG/ML
LOTION TOPICAL 2 TIMES DAILY
COMMUNITY

## 2024-04-30 RX ORDER — LORATADINE 10 MG/1
10 TABLET ORAL DAILY
Qty: 90 TABLET | Refills: 3 | Status: SHIPPED | OUTPATIENT
Start: 2024-04-30

## 2024-04-30 SDOH — ECONOMIC STABILITY: INCOME INSECURITY: HOW HARD IS IT FOR YOU TO PAY FOR THE VERY BASICS LIKE FOOD, HOUSING, MEDICAL CARE, AND HEATING?: NOT VERY HARD

## 2024-04-30 SDOH — ECONOMIC STABILITY: FOOD INSECURITY: WITHIN THE PAST 12 MONTHS, YOU WORRIED THAT YOUR FOOD WOULD RUN OUT BEFORE YOU GOT MONEY TO BUY MORE.: NEVER TRUE

## 2024-04-30 SDOH — ECONOMIC STABILITY: FOOD INSECURITY: WITHIN THE PAST 12 MONTHS, THE FOOD YOU BOUGHT JUST DIDN'T LAST AND YOU DIDN'T HAVE MONEY TO GET MORE.: NEVER TRUE

## 2024-04-30 ASSESSMENT — PATIENT HEALTH QUESTIONNAIRE - PHQ9
SUM OF ALL RESPONSES TO PHQ9 QUESTIONS 1 & 2: 0
2. FEELING DOWN, DEPRESSED OR HOPELESS: NOT AT ALL
SUM OF ALL RESPONSES TO PHQ QUESTIONS 1-9: 0
1. LITTLE INTEREST OR PLEASURE IN DOING THINGS: NOT AT ALL
SUM OF ALL RESPONSES TO PHQ QUESTIONS 1-9: 0

## 2024-04-30 NOTE — PROGRESS NOTES
[facility] PROFESSIONAL SERVS  [facility] MEDICINE  [address]  [address]  [facility] [address]  Dept: [phone]  Dept Fax: [phone]  Loc: [phone]    Valerie Carmona is a 64 y.o. female who presents today for:  Chief Complaint   Patient presents with    6 Month Follow-Up       HPI:     HPI    Here for yearly checkup.    Seeing neurology for infusions for MS.      Hypertension under treatment with medications      Arthritis is controlled with OTC prn.     Seasonal allergies are increasing.  On flonase but discussed claritin daily.     Left lower leg redness for a month and in the past week it is more warm and hot to the touch and now there is a hard area over the distal 4th metatarsal.  No DVT.  Had surgery on the foot 4/3/24.  Doing well.      Reviewed chart forpast medical history , surgical history , allergies, social history , family history and medications.    Health Maintenance   Topic Date Due    HIV screen  Never done    Hepatitis C screen  Never done    Shingles vaccine (1 of 2) Never done    Respiratory Syncytial Virus (RSV) Pregnant or age 60 yrs+ (1 - 1-dose 60+ series) Never done    Depression Screen  03/07/2024    COVID-19 Vaccine (4 - 2023-24 season) 11/01/2024 (Originally 9/1/2023)    Breast cancer screen  10/24/2024    Colorectal Cancer Screen  06/11/2025    Lipids  03/21/2029    DTaP/Tdap/Td vaccine (2 - Td or Tdap) 10/22/2031    Flu vaccine  Completed    Hepatitis A vaccine  Aged Out    Hepatitis B vaccine  Aged Out    Hib vaccine  Aged Out    Polio vaccine  Aged Out    Meningococcal (ACWY) vaccine  Aged Out    Pneumococcal 0-64 years Vaccine  Aged Out       Subjective:      Constitutional:Negative for fever, chills, diaphoresis, activity change, appetite change and fatigue.   HENT: Negative for hearing loss, ear pain, congestion, sore throat, rhinorrhea, postnasal drip and ear discharge.    Eyes: Negative for photophobia and visual disturbance.   Respiratory:

## 2024-06-09 NOTE — PROGRESS NOTES
SUBJECTIVE:   Valerie Carmona is a 64 y.o. female who presents for lesion removal. We have already discussed this procedure, including option of not performing surgery, technique of surgery and potential for scarring at a recent visit.    OBJECTIVE:   Patient appears well. Vitals are normal.  Skin: Changing skin lesion, irritated by clothing.    ASSESSMENT:     Lesion on left upper chest with patient's observations stated as increasing diameter, exam of this area shows raised hemangioma approximately 10 mm in diameter irritated by clothing.      PLAN:   After informed consent was obtained, using Betadine for cleansing and 1% Lidocaine with epinephrine for anesthetic, with sterile technique, elliptical excision in total was performed.  The wound is closed with 3 interrupted sutures using 4-0 Ethilon.  Antibiotic dressing is applied, and wound care instructions provided.  Be alert for any signs of cutaneous infection. The procedure was well tolerated without complications. Follow up: the specimen is labeled and sent to pathology for evaluation, return for suture removal in 10 days.

## 2024-06-18 ENCOUNTER — HOSPITAL ENCOUNTER (OUTPATIENT)
Dept: NURSING | Age: 64
Discharge: HOME | End: 2024-06-18
Payer: COMMERCIAL

## 2024-06-18 VITALS
DIASTOLIC BLOOD PRESSURE: 98 MMHG | OXYGEN SATURATION: 99 % | BODY MASS INDEX: 28.35 KG/M2 | SYSTOLIC BLOOD PRESSURE: 131 MMHG | TEMPERATURE: 97.3 F | RESPIRATION RATE: 18 BRPM | HEART RATE: 86 BPM | WEIGHT: 160 LBS

## 2024-06-18 DIAGNOSIS — G35 MULTIPLE SCLEROSIS, RELAPSING-REMITTING (HCC): Primary | ICD-10-CM

## 2024-06-18 PROCEDURE — 96366 THER/PROPH/DIAG IV INF ADDON: CPT

## 2024-06-18 PROCEDURE — 6360000002 HC RX W HCPCS: Performed by: NURSE PRACTITIONER

## 2024-06-18 PROCEDURE — 6370000000 HC RX 637 (ALT 250 FOR IP): Performed by: NURSE PRACTITIONER

## 2024-06-18 PROCEDURE — 2580000003 HC RX 258: Performed by: NURSE PRACTITIONER

## 2024-06-18 PROCEDURE — 96365 THER/PROPH/DIAG IV INF INIT: CPT

## 2024-06-18 RX ORDER — SODIUM CHLORIDE 9 MG/ML
5-250 INJECTION, SOLUTION INTRAVENOUS PRN
OUTPATIENT
Start: 2024-12-15

## 2024-06-18 RX ORDER — ALBUTEROL SULFATE 90 UG/1
4 AEROSOL, METERED RESPIRATORY (INHALATION) PRN
OUTPATIENT
Start: 2024-06-18

## 2024-06-18 RX ORDER — EPINEPHRINE 1 MG/ML
0.3 INJECTION, SOLUTION INTRAMUSCULAR; SUBCUTANEOUS PRN
OUTPATIENT
Start: 2024-12-15

## 2024-06-18 RX ORDER — DIPHENHYDRAMINE HYDROCHLORIDE 50 MG/ML
50 INJECTION INTRAMUSCULAR; INTRAVENOUS ONCE
Status: COMPLETED | OUTPATIENT
Start: 2024-06-18 | End: 2024-06-18

## 2024-06-18 RX ORDER — SODIUM CHLORIDE 9 MG/ML
5-250 INJECTION, SOLUTION INTRAVENOUS PRN
OUTPATIENT
Start: 2024-06-18

## 2024-06-18 RX ORDER — ACETAMINOPHEN 325 MG/1
650 TABLET ORAL
OUTPATIENT
Start: 2024-12-15

## 2024-06-18 RX ORDER — SODIUM CHLORIDE 0.9 % (FLUSH) 0.9 %
5-40 SYRINGE (ML) INJECTION PRN
OUTPATIENT
Start: 2024-12-15

## 2024-06-18 RX ORDER — ONDANSETRON 2 MG/ML
8 INJECTION INTRAMUSCULAR; INTRAVENOUS
OUTPATIENT
Start: 2024-06-18

## 2024-06-18 RX ORDER — ACETAMINOPHEN 325 MG/1
650 TABLET ORAL ONCE
Status: COMPLETED | OUTPATIENT
Start: 2024-06-18 | End: 2024-06-18

## 2024-06-18 RX ORDER — ACETAMINOPHEN 325 MG/1
650 TABLET ORAL ONCE
Status: CANCELLED | OUTPATIENT
Start: 2024-06-18 | End: 2024-06-18

## 2024-06-18 RX ORDER — HEPARIN 100 UNIT/ML
500 SYRINGE INTRAVENOUS PRN
OUTPATIENT
Start: 2024-12-15

## 2024-06-18 RX ORDER — SODIUM CHLORIDE 0.9 % (FLUSH) 0.9 %
5-40 SYRINGE (ML) INJECTION PRN
OUTPATIENT
Start: 2024-06-18

## 2024-06-18 RX ORDER — HEPARIN SODIUM (PORCINE) LOCK FLUSH IV SOLN 100 UNIT/ML 100 UNIT/ML
500 SOLUTION INTRAVENOUS PRN
OUTPATIENT
Start: 2024-06-18

## 2024-06-18 RX ORDER — DIPHENHYDRAMINE HYDROCHLORIDE 50 MG/ML
50 INJECTION INTRAMUSCULAR; INTRAVENOUS
OUTPATIENT
Start: 2024-12-15

## 2024-06-18 RX ORDER — SODIUM CHLORIDE 9 MG/ML
INJECTION, SOLUTION INTRAVENOUS CONTINUOUS
OUTPATIENT
Start: 2024-12-15

## 2024-06-18 RX ORDER — SODIUM CHLORIDE 9 MG/ML
INJECTION, SOLUTION INTRAVENOUS CONTINUOUS
OUTPATIENT
Start: 2024-06-18

## 2024-06-18 RX ORDER — ACETAMINOPHEN 325 MG/1
650 TABLET ORAL
OUTPATIENT
Start: 2024-06-18

## 2024-06-18 RX ORDER — ACETAMINOPHEN 325 MG/1
650 TABLET ORAL ONCE
OUTPATIENT
Start: 2024-12-15 | End: 2024-12-15

## 2024-06-18 RX ORDER — ONDANSETRON 2 MG/ML
8 INJECTION INTRAMUSCULAR; INTRAVENOUS
OUTPATIENT
Start: 2024-12-15

## 2024-06-18 RX ORDER — SODIUM CHLORIDE 9 MG/ML
5-250 INJECTION, SOLUTION INTRAVENOUS PRN
Status: DISCONTINUED | OUTPATIENT
Start: 2024-06-18 | End: 2024-06-19 | Stop reason: HOSPADM

## 2024-06-18 RX ORDER — SODIUM CHLORIDE 9 MG/ML
5-250 INJECTION, SOLUTION INTRAVENOUS PRN
Status: CANCELLED | OUTPATIENT
Start: 2024-06-18

## 2024-06-18 RX ORDER — DIPHENHYDRAMINE HYDROCHLORIDE 50 MG/ML
50 INJECTION INTRAMUSCULAR; INTRAVENOUS
OUTPATIENT
Start: 2024-06-18

## 2024-06-18 RX ORDER — EPINEPHRINE 1 MG/ML
0.3 INJECTION, SOLUTION, CONCENTRATE INTRAVENOUS PRN
OUTPATIENT
Start: 2024-06-18

## 2024-06-18 RX ORDER — DIPHENHYDRAMINE HYDROCHLORIDE 50 MG/ML
50 INJECTION INTRAMUSCULAR; INTRAVENOUS ONCE
Status: CANCELLED | OUTPATIENT
Start: 2024-06-18 | End: 2024-06-18

## 2024-06-18 RX ORDER — FAMOTIDINE 10 MG/ML
20 INJECTION, SOLUTION INTRAVENOUS
OUTPATIENT
Start: 2024-06-18

## 2024-06-18 RX ORDER — ALBUTEROL SULFATE 90 UG/1
4 AEROSOL, METERED RESPIRATORY (INHALATION) PRN
OUTPATIENT
Start: 2024-12-15

## 2024-06-18 RX ORDER — DIPHENHYDRAMINE HYDROCHLORIDE 50 MG/ML
50 INJECTION INTRAMUSCULAR; INTRAVENOUS ONCE
OUTPATIENT
Start: 2024-12-15 | End: 2024-12-15

## 2024-06-18 RX ADMIN — OCRELIZUMAB 600 MG: 300 INJECTION INTRAVENOUS at 09:08

## 2024-06-18 RX ADMIN — SODIUM CHLORIDE 40 ML/HR: 9 INJECTION, SOLUTION INTRAVENOUS at 08:51

## 2024-06-18 RX ADMIN — DIPHENHYDRAMINE HYDROCHLORIDE 50 MG: 50 INJECTION INTRAMUSCULAR; INTRAVENOUS at 08:40

## 2024-06-18 RX ADMIN — ACETAMINOPHEN 650 MG: 325 TABLET ORAL at 08:40

## 2024-06-18 RX ADMIN — WATER 125 MG: 1 INJECTION INTRAMUSCULAR; INTRAVENOUS; SUBCUTANEOUS at 08:47

## 2024-06-18 NOTE — DISCHARGE INSTRUCTIONS
Ocrevus Discharge Instructions    Can not have an active infection or be on an antibiotic at time of infusion.     Side effects: headache, fatigue, skin itchiness.     Report any unusual diarrhea or severe abdominal pain to your neurologist.     Report any new or worsening neurological signs that have lasted several days such as: problems with thinking, eyesight, strength, and balance.     Next Ocrevus infusion: TUESDAY DECEMBER 17TH, 2024 AT 10:00AM    Please call [phone] with any questions or concerns.

## 2024-06-18 NOTE — PROGRESS NOTES
0820 pt arrives ambulatory with spouse for ocrevus infusion. Infusion explained and questions answered. PT RIGHTS AND RESPONSIBILITIES OFFERED TO PT. Pt denies recent infection or ATB use.   0945 infusion continues. Pt denies needs. Spouse at bedside.   1050 infusion continues. Pt resting quietly. Denies needs.   1120 infusion complete. Pt tolerated it well with no complaints.   1220 pt discharged ambulatory with instructions with no complaints.                     _m___ Safety:       (Environmental)  [place] to environment  Ensure ID band is correct and in place/ allergy band as needed  Assess for fall risk  Initiate fall precautions as applicable (fall band, side rails, etc.)  Call light within reach  Bed in low position/ wheels locked    _m___ Pain:       Assess pain level and characteristics  Administer analgesics as ordered  Assess effectiveness of pain management and report to MD as needed    _m___ Knowledge Deficit:  Assess baseline knowledge  Provide teaching at level of understanding  Provide teaching via preferred learning method  Evaluate teaching effectiveness    __m__ Hemodynamic/Respiratory Status:       (Pre and Post Procedure Monitoring)  Assess/Monitor vital signs and LOC  Assess Baseline SpO2 prior to any sedation  Obtain weight/height  Assess vital signs/ LOC until patient meets discharge criteria  Monitor procedure site and notify MD of any issues

## 2024-07-08 ENCOUNTER — PROCEDURE VISIT (OUTPATIENT)
Dept: FAMILY MEDICINE CLINIC | Age: 64
End: 2024-07-08
Payer: COMMERCIAL

## 2024-07-08 VITALS
RESPIRATION RATE: 17 BRPM | WEIGHT: 175.27 LBS | SYSTOLIC BLOOD PRESSURE: 130 MMHG | TEMPERATURE: 96.9 F | DIASTOLIC BLOOD PRESSURE: 84 MMHG | HEART RATE: 103 BPM | OXYGEN SATURATION: 95 % | BODY MASS INDEX: 31.05 KG/M2 | HEIGHT: 63 IN

## 2024-07-08 DIAGNOSIS — M85.631: ICD-10-CM

## 2024-07-08 DIAGNOSIS — M85.841 OSTEOPENIA OF RIGHT HAND: Primary | ICD-10-CM

## 2024-07-08 DIAGNOSIS — M25.531 RIGHT WRIST PAIN: ICD-10-CM

## 2024-07-08 DIAGNOSIS — L98.9 ENCOUNTER FOR REMOVAL OF SKIN LESION: ICD-10-CM

## 2024-07-08 PROCEDURE — 11401 EXC TR-EXT B9+MARG 0.6-1 CM: CPT | Performed by: FAMILY MEDICINE

## 2024-07-08 RX ORDER — LIDOCAINE HYDROCHLORIDE AND EPINEPHRINE 10; 10 MG/ML; UG/ML
3 INJECTION, SOLUTION INFILTRATION; PERINEURAL ONCE
Status: COMPLETED | OUTPATIENT
Start: 2024-07-08 | End: 2024-07-08

## 2024-07-08 RX ORDER — MELOXICAM 15 MG/1
15 TABLET ORAL DAILY
COMMUNITY
Start: 2024-06-04

## 2024-07-08 RX ADMIN — LIDOCAINE HYDROCHLORIDE AND EPINEPHRINE 3 ML: 10; 10 INJECTION, SOLUTION INFILTRATION; PERINEURAL at 14:40

## 2024-07-22 ENCOUNTER — HOSPITAL ENCOUNTER (OUTPATIENT)
Dept: MRI IMAGING | Age: 64
Discharge: HOME | End: 2024-07-22
Attending: FAMILY MEDICINE
Payer: COMMERCIAL

## 2024-07-22 DIAGNOSIS — M85.631: ICD-10-CM

## 2024-07-22 DIAGNOSIS — M25.531 RIGHT WRIST PAIN: ICD-10-CM

## 2024-07-22 PROCEDURE — 73221 MRI JOINT UPR EXTREM W/O DYE: CPT

## 2024-07-23 ENCOUNTER — OFFICE VISIT (OUTPATIENT)
Dept: FAMILY MEDICINE CLINIC | Age: 64
End: 2024-07-23
Payer: COMMERCIAL

## 2024-07-23 VITALS
BODY MASS INDEX: 31.25 KG/M2 | HEART RATE: 101 BPM | RESPIRATION RATE: 16 BRPM | WEIGHT: 176.37 LBS | TEMPERATURE: 98.7 F | DIASTOLIC BLOOD PRESSURE: 70 MMHG | SYSTOLIC BLOOD PRESSURE: 124 MMHG

## 2024-07-23 DIAGNOSIS — S63.501D COMPLETE TEAR OF WRIST LIGAMENT, RIGHT, SUBSEQUENT ENCOUNTER: Primary | ICD-10-CM

## 2024-07-23 DIAGNOSIS — Z48.02 VISIT FOR SUTURE REMOVAL: ICD-10-CM

## 2024-07-23 PROCEDURE — 99213 OFFICE O/P EST LOW 20 MIN: CPT | Performed by: FAMILY MEDICINE

## 2024-07-23 NOTE — PROGRESS NOTES
[facility] PROFESSIONAL SERVS  [facility]  [address]  [address]  [facility] [address]  Dept: [phone]  Dept Fax: [phone]  Loc: [phone]    Valerie Carmona is a 64 y.o. female who presents today for:  Chief Complaint   Patient presents with    Follow-up     Wrist pain.  Discuss MRI     Suture / Staple Removal         HPI:     HPI    Here for suture removal after procedure done on July 8    Also here to review MRI of the right wrist.      Reviewed chart forpast medical history , surgical history , allergies, social history , family history and medications.    Health Maintenance   Topic Date Due    HIV screen  Never done    Hepatitis C screen  Never done    Shingles vaccine (1 of 2) Never done    Respiratory Syncytial Virus (RSV) Pregnant or age 60 yrs+ (1 - 1-dose 60+ series) Never done    COVID-19 Vaccine (4 - 2023-24 season) 11/01/2024 (Originally 9/1/2023)    Flu vaccine (1) 08/01/2024    Breast cancer screen  10/24/2024    Depression Screen  04/30/2025    Colorectal Cancer Screen  06/11/2025    Lipids  03/21/2029    DTaP/Tdap/Td vaccine (2 - Td or Tdap) 10/22/2031    Hepatitis A vaccine  Aged Out    Hepatitis B vaccine  Aged Out    Hib vaccine  Aged Out    Polio vaccine  Aged Out    Meningococcal (ACWY) vaccine  Aged Out    Pneumococcal 0-64 years Vaccine  Aged Out       Subjective:      Constitutional:Negative for fever, chills, diaphoresis, activity change, appetite change and fatigue.   HENT: Negative for hearing loss, ear pain, congestion, sore throat, rhinorrhea, postnasal drip and ear discharge.    Eyes: Negative for photophobia and visual disturbance.   Respiratory: Negative for cough, chest tightness, shortness of breath and wheezing.    Cardiovascular: Negative for chest pain and leg swelling.   Gastrointestinal: Negative for nausea, vomiting, abdominal pain, diarrhea and constipation.   Genitourinary: Negative for dysuria, urgency and frequency.

## 2024-07-29 ENCOUNTER — HOSPITAL ENCOUNTER (OUTPATIENT)
Dept: WOMENS IMAGING | Age: 64
Discharge: HOME | End: 2024-07-29
Attending: FAMILY MEDICINE
Payer: COMMERCIAL

## 2024-07-29 DIAGNOSIS — M85.841 OSTEOPENIA OF RIGHT HAND: ICD-10-CM

## 2024-07-29 PROCEDURE — 77080 DXA BONE DENSITY AXIAL: CPT

## 2024-08-07 ENCOUNTER — LAB (OUTPATIENT)
Dept: LAB | Age: 64
End: 2024-08-07

## 2024-08-09 ENCOUNTER — OFFICE VISIT (OUTPATIENT)
Dept: NEUROLOGY | Age: 64
End: 2024-08-09
Payer: COMMERCIAL

## 2024-08-09 VITALS
SYSTOLIC BLOOD PRESSURE: 128 MMHG | BODY MASS INDEX: 32.57 KG/M2 | HEIGHT: 62 IN | HEART RATE: 91 BPM | DIASTOLIC BLOOD PRESSURE: 79 MMHG | WEIGHT: 177 LBS | OXYGEN SATURATION: 100 %

## 2024-08-09 DIAGNOSIS — R26.0 ATAXIC GAIT: ICD-10-CM

## 2024-08-09 DIAGNOSIS — G35 MULTIPLE SCLEROSIS (HCC): Primary | ICD-10-CM

## 2024-08-09 PROCEDURE — 99213 OFFICE O/P EST LOW 20 MIN: CPT | Performed by: PSYCHIATRY & NEUROLOGY

## 2024-08-09 NOTE — PROGRESS NOTES
NEUROLOGY OUT PATIENT FOLLOW UP NOTE:  8/9/202412:02 PM    Valerie Carmona is here for follow up for multiple sclerosis. She is here to go over plan.     No Known Allergies    Current Outpatient Medications:     meloxicam (MOBIC) 15 MG tablet, Take 1 tablet by mouth daily, Disp: , Rfl:     loratadine (CLARITIN) 10 MG tablet, Take 1 tablet by mouth daily, Disp: 90 tablet, Rfl: 3    triamcinolone (KENALOG) 0.025 % LOTN lotion, Apply topically 2 times daily, Disp: , Rfl:     cyclobenzaprine (FLEXERIL) 10 MG tablet, Take 1 tablet by mouth nightly, Disp: 90 tablet, Rfl: 1    gabapentin (NEURONTIN) 300 MG capsule, Take 1 capsule by mouth once daily, Disp: 90 capsule, Rfl: 1    amantadine (SYMMETREL) 100 MG capsule, Take 1 capsule by mouth 2 times daily, Disp: 180 capsule, Rfl: 3    baclofen (LIORESAL) 10 MG tablet, TAKE 1 TABLET BY MOUTH THREE TIMES DAILY, Disp: 270 tablet, Rfl: 2    losartan-hydroCHLOROthiazide (HYZAAR) 50-12.5 MG per tablet, Take 1 tablet by mouth daily, Disp: 90 tablet, Rfl: 3    fluticasone (FLONASE) 50 MCG/ACT nasal spray, 2 sprays by Each Nostril route daily, Disp: 3 each, Rfl: 3    Ocrelizumab (OCREVUS IV), Infuse 300 mg intravenously, Disp: , Rfl:     zinc 50 MG CAPS, Take 50 mg by mouth daily, Disp: , Rfl:     Ascorbic Acid (VITAMIN C ER PO), Take by mouth, Disp: , Rfl:     Handicap Placard [facility], Exp. 2025, Disp: 1 each, Rfl: 0    acetaminophen (TYLENOL) 500 MG tablet, Take 2 tablets by mouth every 4 hours as needed, Disp: , Rfl:     therapeutic multivitamin-minerals (THERAGRAN-M) tablet, Take 1 tablet by mouth daily, Disp: , Rfl:     Cholecalciferol (VITAMIN D-3 PO), Take  by mouth 2 times daily., Disp: , Rfl:     calcium carbonate-vitamin D 600-200 MG-UNIT TABS, Take  by mouth 2 times daily., Disp: , Rfl:     I reviewed the past medical history, allergies, medications, social history and family history.       PE:   Vitals:    08/09/24 1142   BP: 128/79   Site: Left Upper Arm   Position: Sitting

## 2024-08-09 NOTE — PATIENT INSTRUCTIONS
Continue Ocrevus infusions, due for 12/2024.      CBC, hepatic panel ordered for 12/2024  Continue with Amantadine 100 mg two times a day. Refills given   Take calcium and vitamin D supplements  Stay physically active  Follow up in 2/2025 or sooner if needed.  Call if any questions or concerns.

## 2024-08-23 DIAGNOSIS — G35 MULTIPLE SCLEROSIS (HCC): ICD-10-CM

## 2024-08-23 RX ORDER — GABAPENTIN 300 MG/1
CAPSULE ORAL
Qty: 90 CAPSULE | Refills: 0 | Status: SHIPPED | OUTPATIENT
Start: 2024-08-23 | End: 2025-02-19

## 2024-08-23 NOTE — TELEPHONE ENCOUNTER
Last visit- 7/23/2024  Next visit- 10/28/2024    Requested Prescriptions     Pending Prescriptions Disp Refills    gabapentin (NEURONTIN) 300 MG capsule [Pharmacy Med Name: Gabapentin 300 MG Oral Capsule] 90 capsule 0     Sig: Take 1 capsule by mouth once daily

## 2024-09-18 DIAGNOSIS — G35 MULTIPLE SCLEROSIS (HCC): ICD-10-CM

## 2024-09-18 RX ORDER — CYCLOBENZAPRINE HCL 10 MG
10 TABLET ORAL NIGHTLY
Qty: 90 TABLET | Refills: 0 | Status: SHIPPED | OUTPATIENT
Start: 2024-09-18

## 2024-10-14 ENCOUNTER — TELEPHONE (OUTPATIENT)
Dept: FAMILY MEDICINE CLINIC | Age: 64
End: 2024-10-14

## 2024-10-14 NOTE — TELEPHONE ENCOUNTER
Pt has an appointment on 10/28/24 for 6 month follow up. Pt calling stating she wants a sooner appointment for her leg. States she is having redness and pain in her left leg. States this episode has been going on for the last month. States she was told if this happens again, to call the office and not wait to be seen. Please advise on where to schedule.

## 2024-10-15 ENCOUNTER — OFFICE VISIT (OUTPATIENT)
Dept: FAMILY MEDICINE CLINIC | Age: 64
End: 2024-10-15
Payer: COMMERCIAL

## 2024-10-15 VITALS
HEIGHT: 62 IN | WEIGHT: 177.03 LBS | RESPIRATION RATE: 17 BRPM | HEART RATE: 91 BPM | OXYGEN SATURATION: 96 % | TEMPERATURE: 98.3 F | SYSTOLIC BLOOD PRESSURE: 138 MMHG | DIASTOLIC BLOOD PRESSURE: 86 MMHG | BODY MASS INDEX: 32.58 KG/M2

## 2024-10-15 DIAGNOSIS — S80.12XA HEMATOMA OF LEG, LEFT, INITIAL ENCOUNTER: ICD-10-CM

## 2024-10-15 DIAGNOSIS — M79.662 PAIN AND SWELLING OF LEFT LOWER LEG: ICD-10-CM

## 2024-10-15 DIAGNOSIS — M79.89 PAIN AND SWELLING OF LEFT LOWER LEG: ICD-10-CM

## 2024-10-15 DIAGNOSIS — L03.116 CELLULITIS OF LEFT LOWER EXTREMITY: Primary | ICD-10-CM

## 2024-10-15 PROCEDURE — 99213 OFFICE O/P EST LOW 20 MIN: CPT | Performed by: FAMILY MEDICINE

## 2024-10-15 PROCEDURE — 96372 THER/PROPH/DIAG INJ SC/IM: CPT | Performed by: FAMILY MEDICINE

## 2024-10-15 RX ORDER — CEFTRIAXONE 1 G/1
1000 INJECTION, POWDER, FOR SOLUTION INTRAMUSCULAR; INTRAVENOUS ONCE
Status: COMPLETED | OUTPATIENT
Start: 2024-10-15 | End: 2024-10-15

## 2024-10-15 RX ORDER — CEPHALEXIN 500 MG/1
500 CAPSULE ORAL 3 TIMES DAILY
Qty: 30 CAPSULE | Refills: 0 | Status: SHIPPED | OUTPATIENT
Start: 2024-10-15 | End: 2024-10-25

## 2024-10-15 RX ADMIN — CEFTRIAXONE 1000 MG: 1 INJECTION, POWDER, FOR SOLUTION INTRAMUSCULAR; INTRAVENOUS at 16:25

## 2024-10-15 NOTE — PROGRESS NOTES
Administrations This Visit       cefTRIAXone (ROCEPHIN) injection 1,000 mg       Admin Date  10/15/2024  16:25 Action  Given Dose  1,000 mg Route  IntraMUSCular Site  Ventrogluteal Left  Documented By  Hawa Short LPN    NDC: 1080-2068-97    Lot#: [id number]    : HOSPIRA    Patient Supplied?: No                    Patient instructed to remain in clinic for 20 minutes after injection and was advised to report any adverse reaction to me immediately.

## 2024-10-15 NOTE — PROGRESS NOTES
Valerie Carmona (:  1960) is a 64 y.o. female, Established patient, here for evaluation of the following chief complaint(s):  Leg Pain (Redness, swelling and pain left leg ongoing for a month )         Assessment & Plan  1. Left leg redness, pain, and swelling.  The symptoms suggest a hematoma and cellulitis. A Doppler ultrasound of the left leg will be ordered to rule out a clot or DVT. She is advised to wear compression socks in the morning and remove them at night as long as the ultrasound is negative for clot. A Rocephin injection will be administered today. Keflex will be prescribed, to be taken three times daily for 10 days.  Prop the leg up when able to.    Results  Imaging  Ultrasound of the leg showed no DVT.  1. Cellulitis of left lower extremity  -     cephALEXin (KEFLEX) 500 MG capsule; Take 1 capsule by mouth 3 times daily for 10 days, Disp-30 capsule, R-0Normal  2. Pain and swelling of left lower leg  -     Vascular duplex lower extremity venous left; Future  -     cefTRIAXone (ROCEPHIN) injection 1,000 mg; 1,000 mg, IntraMUSCular, ONCE, 1 dose, On Tue 10/15/24 at 1645Antimicrobial Indications: OtherOther Abx Indication: cellulitisReconstitute with 3.6 mL 1% lidocaine or sterile water.  3. Hematoma of leg, left, initial encounter  -     cefTRIAXone (ROCEPHIN) injection 1,000 mg; 1,000 mg, IntraMUSCular, ONCE, 1 dose, On Tue 10/15/24 at 1645Antimicrobial Indications: OtherOther Abx Indication: cellulitisReconstitute with 3.6 mL 1% lidocaine or sterile water.  -     cephALEXin (KEFLEX) 500 MG capsule; Take 1 capsule by mouth 3 times daily for 10 days, Disp-30 capsule, R-0Normal    Return for as scheduled.       Subjective   History of Present Illness  The patient is a 64-year-old female here for redness on her leg with pain and swelling of the left leg ongoing for a month.    She has been experiencing persistent redness, pain, and swelling in her left leg since her last visit in 2024.

## 2024-10-21 DIAGNOSIS — I10 ESSENTIAL HYPERTENSION: ICD-10-CM

## 2024-10-21 RX ORDER — LOSARTAN POTASSIUM AND HYDROCHLOROTHIAZIDE 12.5; 5 MG/1; MG/1
1 TABLET ORAL DAILY
Qty: 90 TABLET | Refills: 1 | Status: SHIPPED | OUTPATIENT
Start: 2024-10-21

## 2024-10-22 ENCOUNTER — HOSPITAL ENCOUNTER (OUTPATIENT)
Dept: ULTRASOUND IMAGING | Age: 64
Discharge: HOME | End: 2024-10-22
Attending: FAMILY MEDICINE
Payer: COMMERCIAL

## 2024-10-22 DIAGNOSIS — M79.89 PAIN AND SWELLING OF LEFT LOWER LEG: ICD-10-CM

## 2024-10-22 DIAGNOSIS — M79.662 PAIN AND SWELLING OF LEFT LOWER LEG: ICD-10-CM

## 2024-10-22 PROCEDURE — 93971 EXTREMITY STUDY: CPT

## 2024-10-24 ENCOUNTER — HOSPITAL ENCOUNTER (OUTPATIENT)
Dept: MAMMOGRAPHY | Age: 64
Discharge: HOME | End: 2024-10-24
Payer: COMMERCIAL

## 2024-10-24 DIAGNOSIS — Z12.39 BREAST SCREENING: ICD-10-CM

## 2024-10-24 PROCEDURE — 77063 BREAST TOMOSYNTHESIS BI: CPT

## 2024-10-28 ENCOUNTER — OFFICE VISIT (OUTPATIENT)
Dept: FAMILY MEDICINE CLINIC | Age: 64
End: 2024-10-28
Payer: COMMERCIAL

## 2024-10-28 ENCOUNTER — HOSPITAL ENCOUNTER (OUTPATIENT)
Age: 64
Discharge: HOME | End: 2024-10-28
Payer: COMMERCIAL

## 2024-10-28 ENCOUNTER — HOSPITAL ENCOUNTER (OUTPATIENT)
Dept: GENERAL RADIOLOGY | Age: 64
Discharge: HOME | End: 2024-10-28
Payer: COMMERCIAL

## 2024-10-28 VITALS
HEART RATE: 85 BPM | DIASTOLIC BLOOD PRESSURE: 70 MMHG | WEIGHT: 176.37 LBS | BODY MASS INDEX: 32.25 KG/M2 | RESPIRATION RATE: 16 BRPM | TEMPERATURE: 98.6 F | SYSTOLIC BLOOD PRESSURE: 124 MMHG

## 2024-10-28 DIAGNOSIS — D18.09 HEMANGIOMA OF CHEST WALL: ICD-10-CM

## 2024-10-28 DIAGNOSIS — R42 VERTIGO: ICD-10-CM

## 2024-10-28 DIAGNOSIS — Z01.818 PREOP EXAMINATION: ICD-10-CM

## 2024-10-28 DIAGNOSIS — M15.0 PRIMARY OSTEOARTHRITIS INVOLVING MULTIPLE JOINTS: ICD-10-CM

## 2024-10-28 DIAGNOSIS — G35 MULTIPLE SCLEROSIS (HCC): ICD-10-CM

## 2024-10-28 DIAGNOSIS — S63.501D COMPLETE TEAR OF WRIST LIGAMENT, RIGHT, SUBSEQUENT ENCOUNTER: ICD-10-CM

## 2024-10-28 DIAGNOSIS — M85.631: ICD-10-CM

## 2024-10-28 DIAGNOSIS — Z00.00 ROUTINE GENERAL MEDICAL EXAMINATION AT A HEALTH CARE FACILITY: Primary | ICD-10-CM

## 2024-10-28 DIAGNOSIS — I10 ESSENTIAL HYPERTENSION: ICD-10-CM

## 2024-10-28 DIAGNOSIS — M85.841 OSTEOPENIA OF RIGHT HAND: ICD-10-CM

## 2024-10-28 DIAGNOSIS — I10 PRIMARY HYPERTENSION: ICD-10-CM

## 2024-10-28 DIAGNOSIS — M19.131 SLAC (SCAPHOLUNATE ADVANCED COLLAPSE) OF WRIST, RIGHT: ICD-10-CM

## 2024-10-28 PROCEDURE — 93000 ELECTROCARDIOGRAM COMPLETE: CPT | Performed by: FAMILY MEDICINE

## 2024-10-28 PROCEDURE — 71046 X-RAY EXAM CHEST 2 VIEWS: CPT

## 2024-10-28 PROCEDURE — 99396 PREV VISIT EST AGE 40-64: CPT | Performed by: FAMILY MEDICINE

## 2024-10-28 PROCEDURE — 3078F DIAST BP <80 MM HG: CPT | Performed by: FAMILY MEDICINE

## 2024-10-28 PROCEDURE — 3074F SYST BP LT 130 MM HG: CPT | Performed by: FAMILY MEDICINE

## 2024-10-28 RX ORDER — CYCLOBENZAPRINE HCL 10 MG
10 TABLET ORAL NIGHTLY
Qty: 90 TABLET | Refills: 1 | Status: SHIPPED | OUTPATIENT
Start: 2024-10-28

## 2024-10-28 RX ORDER — LOSARTAN POTASSIUM AND HYDROCHLOROTHIAZIDE 12.5; 5 MG/1; MG/1
1 TABLET ORAL DAILY
Qty: 90 TABLET | Refills: 1 | Status: SHIPPED | OUTPATIENT
Start: 2024-10-28

## 2024-10-28 RX ORDER — GABAPENTIN 300 MG/1
300 CAPSULE ORAL DAILY
Qty: 90 CAPSULE | Refills: 1 | Status: SHIPPED | OUTPATIENT
Start: 2024-10-28 | End: 2025-04-26

## 2024-11-24 DIAGNOSIS — G35 MULTIPLE SCLEROSIS (HCC): ICD-10-CM

## 2024-11-25 NOTE — TELEPHONE ENCOUNTER
Valerie Carmona called requesting a refill on the following medications:  Requested Prescriptions     Pending Prescriptions Disp Refills    baclofen (LIORESAL) 10 MG tablet [Pharmacy Med Name: Baclofen 10 MG Oral Tablet] 270 tablet 0     Sig: TAKE 1 TABLET BY MOUTH THREE TIMES DAILY       Date of last visit: 8/9/2024  Date of next visit (if applicable):2/10/2025  Date of last refill: 12/18/23  Pharmacy Name: Qiana Santos LPN

## 2024-11-26 RX ORDER — BACLOFEN 10 MG/1
TABLET ORAL
Qty: 270 TABLET | Refills: 2 | Status: SHIPPED | OUTPATIENT
Start: 2024-11-26

## 2024-12-02 ENCOUNTER — LAB (OUTPATIENT)
Dept: LAB | Age: 64
End: 2024-12-02

## 2024-12-02 DIAGNOSIS — G35 MULTIPLE SCLEROSIS (HCC): ICD-10-CM

## 2024-12-02 DIAGNOSIS — R26.0 ATAXIC GAIT: ICD-10-CM

## 2024-12-02 LAB
ALBUMIN SERPL BCG-MCNC: 3.9 G/DL (ref 3.5–5.1)
ALP SERPL-CCNC: 122 U/L (ref 38–126)
ALT SERPL W/O P-5'-P-CCNC: 18 U/L (ref 11–66)
ANION GAP SERPL CALC-SCNC: 12 MEQ/L (ref 8–16)
AST SERPL-CCNC: 19 U/L (ref 5–40)
BASOPHILS ABSOLUTE: 0.1 THOU/MM3 (ref 0–0.1)
BASOPHILS NFR BLD AUTO: 0.8 %
BILIRUB SERPL-MCNC: 0.3 MG/DL (ref 0.3–1.2)
BUN SERPL-MCNC: 23 MG/DL (ref 7–22)
CALCIUM SERPL-MCNC: 9.5 MG/DL (ref 8.5–10.5)
CHLORIDE SERPL-SCNC: 107 MEQ/L (ref 98–111)
CO2 SERPL-SCNC: 27 MEQ/L (ref 23–33)
CREAT SERPL-MCNC: 0.8 MG/DL (ref 0.4–1.2)
DEPRECATED RDW RBC AUTO: 45.1 FL (ref 35–45)
EOSINOPHIL NFR BLD AUTO: 10 %
EOSINOPHILS ABSOLUTE: 0.8 THOU/MM3 (ref 0–0.4)
ERYTHROCYTE [DISTWIDTH] IN BLOOD BY AUTOMATED COUNT: 13.2 % (ref 11.5–14.5)
GFR SERPL CREATININE-BSD FRML MDRD: 82 ML/MIN/1.73M2
GLUCOSE SERPL-MCNC: 63 MG/DL (ref 70–108)
HCT VFR BLD AUTO: 40 % (ref 37–47)
HGB BLD-MCNC: 12.6 GM/DL (ref 12–16)
IMM GRANULOCYTES # BLD AUTO: 0.02 THOU/MM3 (ref 0–0.07)
IMM GRANULOCYTES NFR BLD AUTO: 0.3 %
LYMPHOCYTES ABSOLUTE: 2.3 THOU/MM3 (ref 1–4.8)
LYMPHOCYTES NFR BLD AUTO: 29.2 %
MCH RBC QN AUTO: 29.6 PG (ref 26–33)
MCHC RBC AUTO-ENTMCNC: 31.5 GM/DL (ref 32.2–35.5)
MCV RBC AUTO: 93.9 FL (ref 81–99)
MONOCYTES ABSOLUTE: 0.8 THOU/MM3 (ref 0.4–1.3)
MONOCYTES NFR BLD AUTO: 10 %
NEUTROPHILS ABSOLUTE: 3.9 THOU/MM3 (ref 1.8–7.7)
NEUTROPHILS NFR BLD AUTO: 49.7 %
NRBC BLD AUTO-RTO: 0 /100 WBC
PLATELET # BLD AUTO: 317 THOU/MM3 (ref 130–400)
PMV BLD AUTO: 9.5 FL (ref 9.4–12.4)
POTASSIUM SERPL-SCNC: 4.2 MEQ/L (ref 3.5–5.2)
PROT SERPL-MCNC: 6.3 G/DL (ref 6.1–8)
RBC # BLD AUTO: 4.26 MILL/MM3 (ref 4.2–5.4)
SODIUM SERPL-SCNC: 146 MEQ/L (ref 135–145)
WBC # BLD AUTO: 7.8 THOU/MM3 (ref 4.8–10.8)

## 2024-12-17 ENCOUNTER — HOSPITAL ENCOUNTER (OUTPATIENT)
Dept: NURSING | Age: 64
Discharge: HOME | End: 2024-12-17
Payer: COMMERCIAL

## 2024-12-17 VITALS
OXYGEN SATURATION: 99 % | BODY MASS INDEX: 32 KG/M2 | WEIGHT: 175 LBS | RESPIRATION RATE: 16 BRPM | HEART RATE: 88 BPM | SYSTOLIC BLOOD PRESSURE: 164 MMHG | DIASTOLIC BLOOD PRESSURE: 87 MMHG | TEMPERATURE: 97.4 F

## 2024-12-17 DIAGNOSIS — G35 MULTIPLE SCLEROSIS, RELAPSING-REMITTING (HCC): Primary | ICD-10-CM

## 2024-12-17 PROCEDURE — 6360000002 HC RX W HCPCS: Performed by: NURSE PRACTITIONER

## 2024-12-17 PROCEDURE — 6370000000 HC RX 637 (ALT 250 FOR IP): Performed by: NURSE PRACTITIONER

## 2024-12-17 PROCEDURE — 96366 THER/PROPH/DIAG IV INF ADDON: CPT

## 2024-12-17 PROCEDURE — 2580000003 HC RX 258: Performed by: NURSE PRACTITIONER

## 2024-12-17 PROCEDURE — 96365 THER/PROPH/DIAG IV INF INIT: CPT

## 2024-12-17 RX ORDER — SODIUM CHLORIDE 9 MG/ML
5-250 INJECTION, SOLUTION INTRAVENOUS PRN
Status: DISCONTINUED | OUTPATIENT
Start: 2024-12-17 | End: 2024-12-18 | Stop reason: HOSPADM

## 2024-12-17 RX ORDER — DIPHENHYDRAMINE HYDROCHLORIDE 50 MG/ML
50 INJECTION INTRAMUSCULAR; INTRAVENOUS ONCE
Status: COMPLETED | OUTPATIENT
Start: 2024-12-17 | End: 2024-12-17

## 2024-12-17 RX ORDER — SODIUM CHLORIDE 9 MG/ML
5-250 INJECTION, SOLUTION INTRAVENOUS PRN
OUTPATIENT
Start: 2025-06-15

## 2024-12-17 RX ORDER — ALBUTEROL SULFATE 90 UG/1
4 INHALANT RESPIRATORY (INHALATION) PRN
OUTPATIENT
Start: 2025-06-15

## 2024-12-17 RX ORDER — ACETAMINOPHEN 325 MG/1
650 TABLET ORAL ONCE
OUTPATIENT
Start: 2025-06-15 | End: 2025-06-15

## 2024-12-17 RX ORDER — DIPHENHYDRAMINE HYDROCHLORIDE 50 MG/ML
50 INJECTION INTRAMUSCULAR; INTRAVENOUS ONCE
OUTPATIENT
Start: 2025-06-15 | End: 2025-06-15

## 2024-12-17 RX ORDER — ONDANSETRON 2 MG/ML
8 INJECTION INTRAMUSCULAR; INTRAVENOUS
OUTPATIENT
Start: 2025-06-15

## 2024-12-17 RX ORDER — HYDROCORTISONE SODIUM SUCCINATE 100 MG/2ML
100 INJECTION INTRAMUSCULAR; INTRAVENOUS
OUTPATIENT
Start: 2025-06-15

## 2024-12-17 RX ORDER — HEPARIN 100 UNIT/ML
500 SYRINGE INTRAVENOUS PRN
OUTPATIENT
Start: 2025-06-15

## 2024-12-17 RX ORDER — EPINEPHRINE 1 MG/ML
0.3 INJECTION, SOLUTION INTRAMUSCULAR; SUBCUTANEOUS PRN
OUTPATIENT
Start: 2025-06-15

## 2024-12-17 RX ORDER — ACETAMINOPHEN 325 MG/1
650 TABLET ORAL ONCE
Status: COMPLETED | OUTPATIENT
Start: 2024-12-17 | End: 2024-12-17

## 2024-12-17 RX ORDER — DIPHENHYDRAMINE HYDROCHLORIDE 50 MG/ML
50 INJECTION INTRAMUSCULAR; INTRAVENOUS
OUTPATIENT
Start: 2025-06-15

## 2024-12-17 RX ORDER — SODIUM CHLORIDE 0.9 % (FLUSH) 0.9 %
5-40 SYRINGE (ML) INJECTION PRN
OUTPATIENT
Start: 2025-06-15

## 2024-12-17 RX ORDER — SODIUM CHLORIDE 9 MG/ML
INJECTION, SOLUTION INTRAVENOUS CONTINUOUS
OUTPATIENT
Start: 2025-06-15

## 2024-12-17 RX ORDER — SODIUM CHLORIDE 0.9 % (FLUSH) 0.9 %
5-40 SYRINGE (ML) INJECTION PRN
Status: DISCONTINUED | OUTPATIENT
Start: 2024-12-17 | End: 2024-12-18 | Stop reason: HOSPADM

## 2024-12-17 RX ORDER — ACETAMINOPHEN 325 MG/1
650 TABLET ORAL
OUTPATIENT
Start: 2025-06-15

## 2024-12-17 RX ADMIN — ACETAMINOPHEN 650 MG: 325 TABLET ORAL at 10:07

## 2024-12-17 RX ADMIN — OCRELIZUMAB 600 MG: 300 INJECTION INTRAVENOUS at 10:43

## 2024-12-17 RX ADMIN — DIPHENHYDRAMINE HYDROCHLORIDE 50 MG: 50 INJECTION INTRAMUSCULAR; INTRAVENOUS at 10:13

## 2024-12-17 RX ADMIN — WATER 125 MG: 1 INJECTION INTRAMUSCULAR; INTRAVENOUS; SUBCUTANEOUS at 10:16

## 2024-12-17 ASSESSMENT — PAIN - FUNCTIONAL ASSESSMENT: PAIN_FUNCTIONAL_ASSESSMENT: 0-10

## 2024-12-17 NOTE — PROGRESS NOTES
0956  pt admitted to [facility] per ambulation for an ocrevus infuison . Pt denies being sick and is not on any antibiotics at present. PT RIGHTS AND RESPONSIBILITIES OFFERED TO PT.  1016 premeds given and no concerns at present.   1043 infusion in progress. Pt has mart the faster infusion rate in the past.   Rate started at 100 ml/ hr.   1100 no concerns noted.   1115 resting quietly, watching tv at present.   1145  pt has reached max rate of 300 ml /hr.  Mart well at present. Up to bathroom.  1300 infusion completed. Pt mart well stable. Denies any scratchy sore throat or itchyness.    1400  pt eager for discharge.  Alert. Resp even and easy.  Discharge instructions given to patient. Verbalize understanding of home going instructions.   1405 discharge per ambulation         __m__ Safety:       (Environmental)  [place] to environment  Ensure ID band is correct and in place/ allergy band as needed  Assess for fall risk  Initiate fall precautions as applicable (fall band, side rails, etc.)  Call light within reach  Bed in low position/ wheels locked    __m__ Pain:       Assess pain level and characteristics  Administer analgesics as ordered  Assess effectiveness of pain management and report to MD as needed    ___m_ Knowledge Deficit:  Assess baseline knowledge  Provide teaching at level of understanding  Provide teaching via preferred learning method  Evaluate teaching effectiveness    __m__ Hemodynamic/Respiratory Status:       (Pre and Post Procedure Monitoring)  Assess/Monitor vital signs and LOC  Assess Baseline SpO2 prior to any sedation  Obtain weight/height  Assess vital signs/ LOC until patient meets discharge criteria  Monitor procedure site and notify MD of any issues

## 2024-12-17 NOTE — DISCHARGE INSTRUCTIONS
Discharge instructions for Ocrevus:        The most common adverse reaction is upper respiratory tract infusion    If any problems post infusion please contact your physician     Call if unable to maintain appointment and reschedule when available [phone] option 1    Do not come for appointment if active infection call to cancel [phone]

## 2025-02-03 DIAGNOSIS — G35 MULTIPLE SCLEROSIS (HCC): ICD-10-CM

## 2025-02-03 RX ORDER — AMANTADINE HYDROCHLORIDE 100 MG/1
100 CAPSULE, GELATIN COATED ORAL 2 TIMES DAILY
Qty: 180 CAPSULE | Refills: 3 | Status: SHIPPED | OUTPATIENT
Start: 2025-02-03

## 2025-02-03 NOTE — TELEPHONE ENCOUNTER
Please approve or deny     Last Visit Date:  8/9/2024       Next Visit Date:    2/19/2025  Paige Leopold CNP

## 2025-02-19 ENCOUNTER — OFFICE VISIT (OUTPATIENT)
Dept: NEUROLOGY | Age: 65
End: 2025-02-19
Payer: COMMERCIAL

## 2025-02-19 VITALS
WEIGHT: 180.2 LBS | OXYGEN SATURATION: 98 % | DIASTOLIC BLOOD PRESSURE: 80 MMHG | SYSTOLIC BLOOD PRESSURE: 132 MMHG | HEIGHT: 62 IN | HEART RATE: 86 BPM | BODY MASS INDEX: 33.16 KG/M2

## 2025-02-19 DIAGNOSIS — G35 MULTIPLE SCLEROSIS (HCC): Primary | ICD-10-CM

## 2025-02-19 DIAGNOSIS — R26.0 ATAXIC GAIT: ICD-10-CM

## 2025-02-19 PROCEDURE — 3075F SYST BP GE 130 - 139MM HG: CPT | Performed by: NURSE PRACTITIONER

## 2025-02-19 PROCEDURE — 99213 OFFICE O/P EST LOW 20 MIN: CPT | Performed by: NURSE PRACTITIONER

## 2025-02-19 PROCEDURE — 3079F DIAST BP 80-89 MM HG: CPT | Performed by: NURSE PRACTITIONER

## 2025-02-19 NOTE — PROGRESS NOTES
NEUROLOGY OUT PATIENT FOLLOW UP NOTE:  2/19/20251:50 PM    Valerie Carmona is here for follow up for multiple sclerosis.            No Known Allergies    Current Outpatient Medications:     amantadine (SYMMETREL) 100 MG capsule, Take 1 capsule by mouth twice daily, Disp: 180 capsule, Rfl: 3    baclofen (LIORESAL) 10 MG tablet, TAKE 1 TABLET BY MOUTH THREE TIMES DAILY, Disp: 270 tablet, Rfl: 2    gabapentin (NEURONTIN) 300 MG capsule, Take 1 capsule by mouth daily for 180 days., Disp: 90 capsule, Rfl: 1    losartan-hydroCHLOROthiazide (HYZAAR) 50-12.5 MG per tablet, Take 1 tablet by mouth daily, Disp: 90 tablet, Rfl: 1    meloxicam (MOBIC) 15 MG tablet, Take 1 tablet by mouth daily, Disp: , Rfl:     loratadine (CLARITIN) 10 MG tablet, Take 1 tablet by mouth daily, Disp: 90 tablet, Rfl: 3    triamcinolone (KENALOG) 0.025 % LOTN lotion, Apply topically 2 times daily, Disp: , Rfl:     Ocrelizumab (OCREVUS IV), Infuse 300 mg intravenously, Disp: , Rfl:     zinc 50 MG CAPS, Take 50 mg by mouth daily, Disp: , Rfl:     Ascorbic Acid (VITAMIN C ER PO), Take by mouth, Disp: , Rfl:     Handicap Placard [facility], Exp. 2025, Disp: 1 each, Rfl: 0    acetaminophen (TYLENOL) 500 MG tablet, Take 2 tablets by mouth every 4 hours as needed, Disp: , Rfl:     therapeutic multivitamin-minerals (THERAGRAN-M) tablet, Take 1 tablet by mouth daily, Disp: , Rfl:     Cholecalciferol (VITAMIN D-3 PO), Take  by mouth 2 times daily., Disp: , Rfl:     calcium carbonate-vitamin D 600-200 MG-UNIT TABS, Take  by mouth 2 times daily., Disp: , Rfl:     cyclobenzaprine (FLEXERIL) 10 MG tablet, Take 1 tablet by mouth nightly (Patient not taking: Reported on 2/19/2025), Disp: 90 tablet, Rfl: 1    fluticasone (FLONASE) 50 MCG/ACT nasal spray, 2 sprays by Each Nostril route daily (Patient not taking: Reported on 10/28/2024), Disp: 3 each, Rfl: 3    I reviewed the past medical history, allergies, medications, social history and family history.       PE:   Vitals: 
cbc, cmp done

## 2025-02-19 NOTE — PATIENT INSTRUCTIONS
MRI brain W/Wo contrast  IgG  IgM  IgA  Continue Ocrevus infusions     CBC, hepatic panel ordered for 6/2025  Continue with Amantadine 100 mg two times a day. Refills given   Take calcium and vitamin D supplements  Stay physically active  Handicap placard given today  Follow up in 8/2025  Call if any questions or concerns.

## 2025-03-10 ENCOUNTER — HOSPITAL ENCOUNTER (OUTPATIENT)
Dept: MRI IMAGING | Age: 65
Discharge: HOME | End: 2025-03-10
Payer: MEDICARE

## 2025-03-10 DIAGNOSIS — G35 MULTIPLE SCLEROSIS (HCC): ICD-10-CM

## 2025-03-10 DIAGNOSIS — R26.0 ATAXIC GAIT: ICD-10-CM

## 2025-03-10 PROCEDURE — A9579 GAD-BASE MR CONTRAST NOS,1ML: HCPCS | Performed by: NURSE PRACTITIONER

## 2025-03-10 PROCEDURE — 6360000004 HC RX CONTRAST MEDICATION: Performed by: NURSE PRACTITIONER

## 2025-03-10 PROCEDURE — 70553 MRI BRAIN STEM W/O & W/DYE: CPT

## 2025-03-10 RX ADMIN — GADOTERIDOL 20 ML: 279.3 INJECTION, SOLUTION INTRAVENOUS at 11:57

## 2025-03-11 ENCOUNTER — RESULTS FOLLOW-UP (OUTPATIENT)
Dept: MRI IMAGING | Age: 65
End: 2025-03-11

## 2025-03-11 NOTE — TELEPHONE ENCOUNTER
----- Message from Paige Leopold, APRN - CNP sent at 3/11/2025  9:12 AM EDT -----  Please let patient know her MRI brain showed interval development of 4 mm linear focus of abnormal signal in the white matter of the posterior right basal ganglia. This does not enhance.  Recommend continuing on Ocrevus. Would recommend repeating MRI brain W/WO contrast in 6 months to ensure stability  Paige Leopold, CNP

## 2025-03-11 NOTE — TELEPHONE ENCOUNTER
1st attempt - left message for patient to return call to office.     Patient also left a voicemail regarding a medication refill. Please advise with this encounter as well.

## 2025-03-18 DIAGNOSIS — G35 MULTIPLE SCLEROSIS (HCC): ICD-10-CM

## 2025-03-18 RX ORDER — AMANTADINE HYDROCHLORIDE 100 MG/1
100 CAPSULE, GELATIN COATED ORAL 2 TIMES DAILY
Qty: 180 CAPSULE | Refills: 3 | Status: SHIPPED | OUTPATIENT
Start: 2025-03-18

## 2025-03-18 RX ORDER — BACLOFEN 10 MG/1
TABLET ORAL
Qty: 270 TABLET | Refills: 2 | Status: SHIPPED | OUTPATIENT
Start: 2025-03-18

## 2025-03-18 NOTE — TELEPHONE ENCOUNTER
Spoke to the patient regarding results. Verbalized understanding with no additional questions at this time.

## 2025-03-18 NOTE — TELEPHONE ENCOUNTER
Patient is requesting refill to be sent to a new pharmacy- [facility].   Please approve or deny     Last Visit Date:  2/19/2025   Paige Leopold CNP    Next Visit Date:    8/22/2025  Paige Leopold CNP

## 2025-04-29 ENCOUNTER — OFFICE VISIT (OUTPATIENT)
Dept: FAMILY MEDICINE CLINIC | Age: 65
End: 2025-04-29

## 2025-04-29 VITALS
RESPIRATION RATE: 16 BRPM | SYSTOLIC BLOOD PRESSURE: 126 MMHG | OXYGEN SATURATION: 97 % | TEMPERATURE: 98.4 F | DIASTOLIC BLOOD PRESSURE: 84 MMHG | HEART RATE: 86 BPM | BODY MASS INDEX: 32.82 KG/M2 | WEIGHT: 178.35 LBS | HEIGHT: 62 IN

## 2025-04-29 DIAGNOSIS — L91.8 SKIN TAG: ICD-10-CM

## 2025-04-29 DIAGNOSIS — J30.2 SEASONAL ALLERGIES: ICD-10-CM

## 2025-04-29 DIAGNOSIS — I10 ESSENTIAL HYPERTENSION: Primary | ICD-10-CM

## 2025-04-29 DIAGNOSIS — G35 MULTIPLE SCLEROSIS (HCC): ICD-10-CM

## 2025-04-29 DIAGNOSIS — M79.662 PAIN AND SWELLING OF LEFT LOWER LEG: ICD-10-CM

## 2025-04-29 DIAGNOSIS — R22.42 MASS OF LEFT FOOT: ICD-10-CM

## 2025-04-29 DIAGNOSIS — M79.89 PAIN AND SWELLING OF LEFT LOWER LEG: ICD-10-CM

## 2025-04-29 DIAGNOSIS — M15.0 PRIMARY OSTEOARTHRITIS INVOLVING MULTIPLE JOINTS: ICD-10-CM

## 2025-04-29 DIAGNOSIS — M19.131 SLAC (SCAPHOLUNATE ADVANCED COLLAPSE) OF WRIST, RIGHT: ICD-10-CM

## 2025-04-29 DIAGNOSIS — R42 VERTIGO: ICD-10-CM

## 2025-04-29 DIAGNOSIS — S63.501D COMPLETE TEAR OF WRIST LIGAMENT, RIGHT, SUBSEQUENT ENCOUNTER: ICD-10-CM

## 2025-04-29 DIAGNOSIS — M85.841 OSTEOPENIA OF RIGHT HAND: ICD-10-CM

## 2025-04-29 DIAGNOSIS — D18.09 HEMANGIOMA OF CHEST WALL: ICD-10-CM

## 2025-04-29 DIAGNOSIS — M85.631: ICD-10-CM

## 2025-04-29 RX ORDER — LOSARTAN POTASSIUM AND HYDROCHLOROTHIAZIDE 12.5; 5 MG/1; MG/1
1 TABLET ORAL DAILY
Qty: 90 TABLET | Refills: 1 | Status: SHIPPED | OUTPATIENT
Start: 2025-04-29

## 2025-04-29 RX ORDER — BACLOFEN 10 MG/1
TABLET ORAL
Qty: 270 TABLET | Refills: 1 | Status: SHIPPED | OUTPATIENT
Start: 2025-04-29

## 2025-04-29 RX ORDER — AMANTADINE HYDROCHLORIDE 100 MG/1
100 CAPSULE, GELATIN COATED ORAL 2 TIMES DAILY
Qty: 180 CAPSULE | Refills: 1 | Status: SHIPPED | OUTPATIENT
Start: 2025-04-29

## 2025-04-29 RX ORDER — AZELASTINE 1 MG/ML
2 SPRAY, METERED NASAL 2 TIMES DAILY
Qty: 3 EACH | Refills: 3 | Status: SHIPPED | OUTPATIENT
Start: 2025-04-29

## 2025-04-29 RX ORDER — GABAPENTIN 300 MG/1
300 CAPSULE ORAL DAILY
Qty: 90 CAPSULE | Refills: 1 | Status: SHIPPED | OUTPATIENT
Start: 2025-04-29 | End: 2025-10-26

## 2025-04-29 SDOH — ECONOMIC STABILITY: FOOD INSECURITY: WITHIN THE PAST 12 MONTHS, YOU WORRIED THAT YOUR FOOD WOULD RUN OUT BEFORE YOU GOT MONEY TO BUY MORE.: NEVER TRUE

## 2025-04-29 SDOH — ECONOMIC STABILITY: FOOD INSECURITY: WITHIN THE PAST 12 MONTHS, THE FOOD YOU BOUGHT JUST DIDN'T LAST AND YOU DIDN'T HAVE MONEY TO GET MORE.: NEVER TRUE

## 2025-04-29 ASSESSMENT — PATIENT HEALTH QUESTIONNAIRE - PHQ9
SUM OF ALL RESPONSES TO PHQ QUESTIONS 1-9: 0
SUM OF ALL RESPONSES TO PHQ QUESTIONS 1-9: 0
1. LITTLE INTEREST OR PLEASURE IN DOING THINGS: NOT AT ALL
SUM OF ALL RESPONSES TO PHQ QUESTIONS 1-9: 0
2. FEELING DOWN, DEPRESSED OR HOPELESS: NOT AT ALL
SUM OF ALL RESPONSES TO PHQ QUESTIONS 1-9: 0

## 2025-04-29 NOTE — PROGRESS NOTES
[facility]  [facility] MEDICINE  [address]  [address]  [facility] [address]  Dept: [phone]  Dept Fax: [phone]  Loc: [phone]    Valerie Carmona is a 65 y.o. female who presents today for:  Chief Complaint   Patient presents with    6 Month Follow-Up     HTN, MS    Skin Tag     Outer left breast and center of chest       HPI:     HPI    Here for yearly checkup.    Seeing neurology for infusions for MS.      Hypertension under treatment with medications      Arthritis is controlled with OTC prn.     Seasonal allergies are increasing.  On flonase but discussed claritin daily prn.   Also can use astepro if the flonase is making her nose too dry.    Left lower leg redness for a month and in the past week it is more warm and hot to the touch and now there is a hard area over the distal 4th metatarsal.  No DVT.  Had surgery on the foot 4/3/24.  Doing well.   Wraps from time to time.      Right foot with 3rd toe pain worse when she takes her shoes off and walks with bare feet at home.   Better with podiatry.      Reviewed chart forpast medical history , surgical history , allergies, social history , family history and medications.    Health Maintenance   Topic Date Due    HIV screen  Never done    Hepatitis C screen  Never done    Pneumococcal 50+ years Vaccine (2 of 2 - PCV) 10/19/2020    Annual Wellness Visit (Medicare Advantage)  Never done    Shingles vaccine (1 of 2) 04/29/2026 (Originally 3/19/2010)    COVID-19 Vaccine (4 - 2024-25 season) 10/28/2029 (Originally 9/1/2024)    Colorectal Cancer Screen  06/11/2025    Breast cancer screen  10/24/2025    Depression Screen  04/29/2026    Lipids  03/05/2030    DTaP/Tdap/Td vaccine (2 - Td or Tdap) 10/22/2031    Respiratory Syncytial Virus (RSV) Pregnant or age 60 yrs+ (1 - 1-dose 75+ series) 03/19/2035    DEXA (modify frequency per FRAX score)  Completed    Flu vaccine  Completed    Hepatitis A vaccine  Aged Out

## 2025-05-01 ENCOUNTER — TELEPHONE (OUTPATIENT)
Dept: FAMILY MEDICINE CLINIC | Age: 65
End: 2025-05-01

## 2025-05-01 DIAGNOSIS — R42 VERTIGO: Primary | ICD-10-CM

## 2025-05-01 NOTE — TELEPHONE ENCOUNTER
Pt called requesting a referral for PT sent to [facility] for her vertigo. Order pended for signature.

## 2025-06-03 ENCOUNTER — LAB (OUTPATIENT)
Dept: LAB | Age: 65
End: 2025-06-03

## 2025-06-03 DIAGNOSIS — G35 MULTIPLE SCLEROSIS (HCC): ICD-10-CM

## 2025-06-03 DIAGNOSIS — R26.0 ATAXIC GAIT: ICD-10-CM

## 2025-06-03 LAB
ALBUMIN SERPL BCG-MCNC: 3.9 G/DL (ref 3.4–4.9)
ALP SERPL-CCNC: 116 U/L (ref 38–126)
ALT SERPL W/O P-5'-P-CCNC: 21 U/L (ref 10–35)
AST SERPL-CCNC: 19 U/L (ref 10–35)
BASOPHILS ABSOLUTE: 0.1 THOU/MM3 (ref 0–0.1)
BASOPHILS NFR BLD AUTO: 0.6 %
BILIRUB CONJ SERPL-MCNC: 0.2 MG/DL (ref 0–0.2)
BILIRUB SERPL-MCNC: 0.4 MG/DL (ref 0.3–1.2)
DEPRECATED RDW RBC AUTO: 44.3 FL (ref 35–45)
EOSINOPHIL NFR BLD AUTO: 6.1 %
EOSINOPHILS ABSOLUTE: 0.5 THOU/MM3 (ref 0–0.4)
ERYTHROCYTE [DISTWIDTH] IN BLOOD BY AUTOMATED COUNT: 13.3 % (ref 11.5–14.5)
HCT VFR BLD AUTO: 41.4 % (ref 37–47)
HGB BLD-MCNC: 13.3 GM/DL (ref 12–16)
IMM GRANULOCYTES # BLD AUTO: 0.04 THOU/MM3 (ref 0–0.07)
IMM GRANULOCYTES NFR BLD AUTO: 0.5 %
LYMPHOCYTES ABSOLUTE: 2.8 THOU/MM3 (ref 1–4.8)
LYMPHOCYTES NFR BLD AUTO: 33.1 %
MCH RBC QN AUTO: 29.2 PG (ref 26–33)
MCHC RBC AUTO-ENTMCNC: 32.1 GM/DL (ref 32.2–35.5)
MCV RBC AUTO: 91 FL (ref 81–99)
MONOCYTES ABSOLUTE: 0.7 THOU/MM3 (ref 0.4–1.3)
MONOCYTES NFR BLD AUTO: 8.5 %
NEUTROPHILS ABSOLUTE: 4.3 THOU/MM3 (ref 1.8–7.7)
NEUTROPHILS NFR BLD AUTO: 51.2 %
NRBC BLD AUTO-RTO: 0 /100 WBC
PLATELET # BLD AUTO: 349 THOU/MM3 (ref 130–400)
PMV BLD AUTO: 9.9 FL (ref 9.4–12.4)
PROT SERPL-MCNC: 6.3 G/DL (ref 6.4–8.3)
RBC # BLD AUTO: 4.55 MILL/MM3 (ref 4.2–5.4)
WBC # BLD AUTO: 8.4 THOU/MM3 (ref 4.8–10.8)

## 2025-06-04 LAB
IGA SERPL-MCNC: 164 MG/DL (ref 70–400)
IGG SERPL-MCNC: 920 MG/DL (ref 700–1600)
IGM SERPL-MCNC: < 25 MG/DL (ref 40–230)

## 2025-06-05 ENCOUNTER — RESULTS FOLLOW-UP (OUTPATIENT)
Dept: NEUROLOGY | Age: 65
End: 2025-06-05

## 2025-06-12 ENCOUNTER — OFFICE VISIT (OUTPATIENT)
Dept: NEUROLOGY | Age: 65
End: 2025-06-12
Payer: MEDICARE

## 2025-06-12 VITALS
SYSTOLIC BLOOD PRESSURE: 134 MMHG | HEART RATE: 76 BPM | DIASTOLIC BLOOD PRESSURE: 90 MMHG | WEIGHT: 178.25 LBS | BODY MASS INDEX: 32.8 KG/M2 | HEIGHT: 62 IN

## 2025-06-12 DIAGNOSIS — R26.0 ATAXIC GAIT: ICD-10-CM

## 2025-06-12 DIAGNOSIS — G35 MULTIPLE SCLEROSIS (HCC): Primary | ICD-10-CM

## 2025-06-12 PROCEDURE — 3075F SYST BP GE 130 - 139MM HG: CPT | Performed by: PSYCHIATRY & NEUROLOGY

## 2025-06-12 PROCEDURE — 1123F ACP DISCUSS/DSCN MKR DOCD: CPT | Performed by: PSYCHIATRY & NEUROLOGY

## 2025-06-12 PROCEDURE — 3080F DIAST BP >= 90 MM HG: CPT | Performed by: PSYCHIATRY & NEUROLOGY

## 2025-06-12 PROCEDURE — 99214 OFFICE O/P EST MOD 30 MIN: CPT | Performed by: PSYCHIATRY & NEUROLOGY

## 2025-06-12 NOTE — PATIENT INSTRUCTIONS
Stop Ocrevus.   Report any new symptoms immediately.   Repeat MRI Brain 10/2025. (Please call to schedule prior)   IgA, IgG, IgM repeat in 10/2025.   Continue with Amantadine 100 mg two times a day. Refills given   Stay physically active  Handicap placard given today  Follow up in 10/2025  Call if any questions or concerns.

## 2025-06-12 NOTE — PROGRESS NOTES
NEUROLOGY OUT PATIENT FOLLOW UP NOTE:  6/12/20258:56 AM    Valerie Carmona is here for follow up for multiple sclerosis. She is here to go over plan.       No Known Allergies    Current Outpatient Medications:     Multiple Vitamins-Minerals (AIRBORNE) CHEW, Take 3 tablets by mouth daily, Disp: , Rfl:     losartan-hydroCHLOROthiazide (HYZAAR) 50-12.5 MG per tablet, Take 1 tablet by mouth daily, Disp: 90 tablet, Rfl: 1    gabapentin (NEURONTIN) 300 MG capsule, Take 1 capsule by mouth daily for 180 days., Disp: 90 capsule, Rfl: 1    baclofen (LIORESAL) 10 MG tablet, TAKE 1 TABLET BY MOUTH THREE TIMES DAILY, Disp: 270 tablet, Rfl: 1    amantadine (SYMMETREL) 100 MG capsule, Take 1 capsule by mouth 2 times daily, Disp: 180 capsule, Rfl: 1    azelastine (ASTELIN) 0.1 % nasal spray, 2 sprays by Nasal route 2 times daily Use in each nostril as directed, Disp: 3 each, Rfl: 3    Handicap Placard MISC, by Does not apply route EXP 2/19/2030, Disp: 1 each, Rfl: 0    loratadine (CLARITIN) 10 MG tablet, Take 1 tablet by mouth daily, Disp: 90 tablet, Rfl: 3    Ocrelizumab (OCREVUS IV), Infuse 300 mg intravenously, Disp: , Rfl:     zinc 50 MG CAPS, Take 50 mg by mouth daily, Disp: , Rfl:     Ascorbic Acid (VITAMIN C ER PO), Take by mouth, Disp: , Rfl:     acetaminophen (TYLENOL) 500 MG tablet, Take 2 tablets by mouth every 4 hours as needed, Disp: , Rfl:     Cholecalciferol (VITAMIN D-3 PO), Take  by mouth 2 times daily., Disp: , Rfl:     calcium carbonate-vitamin D 600-200 MG-UNIT TABS, Take  by mouth 2 times daily., Disp: , Rfl:     therapeutic multivitamin-minerals (THERAGRAN-M) tablet, Take 1 tablet by mouth daily, Disp: , Rfl:     I reviewed the past medical history, allergies, medications, social history and family history.       PE:   Vitals:    06/12/25 0837   BP: (!) 134/90   BP Site: Left Upper Arm   Patient Position: Sitting   BP Cuff Size: Large Adult   Pulse: 76   Weight: 80.9 kg (178 lb 4 oz)   Height: 1.575 m (5' 2\")

## 2025-06-17 ENCOUNTER — HOSPITAL ENCOUNTER (OUTPATIENT)
Dept: NURSING | Age: 65
Discharge: HOME | End: 2025-06-17

## 2025-07-17 ENCOUNTER — TELEPHONE (OUTPATIENT)
Dept: FAMILY MEDICINE CLINIC | Age: 65
End: 2025-07-17

## 2025-07-17 NOTE — TELEPHONE ENCOUNTER
Pt called stating that she started with breathing problems a few days ago. She has also had a 102 fever and green phlegm. She will cough whenever she takes a deep breath. She stated she feels the same as she did when she had pneumonia a few years ago. She is wondering if a CXR could be ordered.

## 2025-07-18 ENCOUNTER — HOSPITAL ENCOUNTER (EMERGENCY)
Age: 65
Discharge: HOME | End: 2025-07-19
Attending: FAMILY MEDICINE
Payer: MEDICARE

## 2025-07-18 ENCOUNTER — APPOINTMENT (OUTPATIENT)
Dept: GENERAL RADIOLOGY | Age: 65
End: 2025-07-18
Payer: MEDICARE

## 2025-07-18 VITALS
DIASTOLIC BLOOD PRESSURE: 57 MMHG | BODY MASS INDEX: 32.2 KG/M2 | WEIGHT: 175 LBS | OXYGEN SATURATION: 98 % | RESPIRATION RATE: 18 BRPM | TEMPERATURE: 97.2 F | SYSTOLIC BLOOD PRESSURE: 146 MMHG | HEART RATE: 115 BPM | HEIGHT: 62 IN

## 2025-07-18 DIAGNOSIS — R60.0 PERIPHERAL EDEMA: Primary | ICD-10-CM

## 2025-07-18 DIAGNOSIS — L03.90 CELLULITIS, UNSPECIFIED CELLULITIS SITE: ICD-10-CM

## 2025-07-18 LAB
BASOPHILS # BLD: 0.4 % (ref 0–3)
BASOPHILS ABSOLUTE: 0 THOU/MM3 (ref 0–0.1)
EOSINOPHILS ABSOLUTE: 0.2 THOU/MM3 (ref 0–0.5)
EOSINOPHILS RELATIVE PERCENT: 2.1 % (ref 0–4)
HCT VFR BLD CALC: 35.8 % (ref 37–47)
HEMOGLOBIN: 11.7 GM/DL (ref 12–16)
IMMATURE GRANS (ABS): 0 THOU/MM3 (ref 0–0.07)
IMMATURE GRANULOCYTES %: 0 %
LYMPHOCYTES # BLD AUTO: 18.1 % (ref 15–47)
LYMPHOCYTES ABSOLUTE: 1.7 THOU/MM3 (ref 1–4.8)
MCH RBC QN AUTO: 29.5 PG (ref 26–32)
MCHC RBC AUTO-ENTMCNC: 32.7 GM/DL (ref 31–35)
MCV RBC AUTO: 90.4 FL (ref 81–99)
MONOCYTES: 1.3 THOU/MM3 (ref 0.3–1.3)
MONOCYTES: 13.4 % (ref 0–12)
NEUTROPHILS ABSOLUTE: 6.3 THOU/MM3 (ref 1.8–7.7)
PDW BLD-RTO: 13.4 % (ref 11.5–14.9)
PLATELET # BLD AUTO: 230 THOU/MM3 (ref 130–400)
PMV BLD AUTO: 9.1 FL (ref 9.4–12.4)
RBC # BLD: 3.96 MILL/MM3 (ref 4.1–5.3)
SEG NEUTROPHILS: 65.9 % (ref 43–75)
WBC # BLD: 9.5 THOU/MM3 (ref 4.8–10.8)

## 2025-07-18 PROCEDURE — 71046 X-RAY EXAM CHEST 2 VIEWS: CPT

## 2025-07-18 PROCEDURE — 99285 EMERGENCY DEPT VISIT HI MDM: CPT

## 2025-07-18 PROCEDURE — 80053 COMPREHEN METABOLIC PANEL: CPT

## 2025-07-18 PROCEDURE — 85025 COMPLETE CBC W/AUTO DIFF WBC: CPT

## 2025-07-18 PROCEDURE — 83880 ASSAY OF NATRIURETIC PEPTIDE: CPT

## 2025-07-18 PROCEDURE — 93005 ELECTROCARDIOGRAM TRACING: CPT | Performed by: FAMILY MEDICINE

## 2025-07-18 PROCEDURE — 84484 ASSAY OF TROPONIN QUANT: CPT

## 2025-07-18 ASSESSMENT — PAIN - FUNCTIONAL ASSESSMENT: PAIN_FUNCTIONAL_ASSESSMENT: 0-10

## 2025-07-18 ASSESSMENT — ENCOUNTER SYMPTOMS
VOMITING: 0
NAUSEA: 0
SHORTNESS OF BREATH: 0
COUGH: 1

## 2025-07-18 ASSESSMENT — PAIN DESCRIPTION - ORIENTATION: ORIENTATION: RIGHT;LEFT

## 2025-07-18 ASSESSMENT — PAIN DESCRIPTION - LOCATION: LOCATION: LEG

## 2025-07-18 ASSESSMENT — PAIN SCALES - GENERAL: PAINLEVEL_OUTOF10: 5

## 2025-07-19 LAB
ALBUMIN SERPL BCP-MCNC: 3.4 GM/DL (ref 3.4–5)
ALP SERPL-CCNC: 118 U/L (ref 46–116)
ALT SERPL W P-5'-P-CCNC: 38 U/L (ref 14–63)
ANION GAP SERPL CALC-SCNC: 9 MEQ/L (ref 8–16)
AST SERPL W P-5'-P-CCNC: 34 U/L (ref 15–37)
BILIRUB SERPL-MCNC: 0.5 MG/DL (ref 0.2–1)
BUN SERPL-MCNC: 20 MG/DL (ref 7–18)
CALCIUM SERPL-MCNC: 9.1 MG/DL (ref 8.5–10.1)
CHLORIDE SERPL-SCNC: 98 MEQ/L (ref 98–107)
CO2 SERPL-SCNC: 27 MEQ/L (ref 21–32)
CREAT SERPL-MCNC: 1.3 MG/DL (ref 0.6–1.3)
EKG ATRIAL RATE: 101 BPM
EKG P AXIS: 34 DEGREES
EKG P-R INTERVAL: 138 MS
EKG Q-T INTERVAL: 342 MS
EKG QRS DURATION: 82 MS
EKG QTC CALCULATION (BAZETT): 443 MS
EKG R AXIS: 37 DEGREES
EKG T AXIS: 32 DEGREES
EKG VENTRICULAR RATE: 101 BPM
GFR SERPL CREATININE-BSD FRML MDRD: 46 ML/MIN/1.73M2
GLUCOSE SERPL-MCNC: 87 MG/DL (ref 74–106)
NT PRO BNP: 377 PG/ML (ref 0–125)
POTASSIUM SERPL-SCNC: 3.3 MEQ/L (ref 3.5–5.1)
PROT SERPL-MCNC: 7.3 GM/DL (ref 6.4–8.2)
SODIUM SERPL-SCNC: 134 MEQ/L (ref 136–145)
TROPONIN, HIGH SENSITIVITY: 9.5 PG/ML (ref 0–51.3)

## 2025-07-19 PROCEDURE — 6370000000 HC RX 637 (ALT 250 FOR IP): Performed by: FAMILY MEDICINE

## 2025-07-19 PROCEDURE — 93010 ELECTROCARDIOGRAM REPORT: CPT | Performed by: NUCLEAR MEDICINE

## 2025-07-19 RX ORDER — CEPHALEXIN 500 MG/1
500 CAPSULE ORAL 4 TIMES DAILY
Qty: 28 CAPSULE | Refills: 0 | Status: SHIPPED | OUTPATIENT
Start: 2025-07-19 | End: 2025-07-23 | Stop reason: SDUPTHER

## 2025-07-19 RX ORDER — FUROSEMIDE 20 MG/1
20 TABLET ORAL DAILY
Qty: 30 TABLET | Refills: 0 | Status: SHIPPED | OUTPATIENT
Start: 2025-07-19

## 2025-07-19 RX ADMIN — POTASSIUM BICARBONATE 20 MEQ: 782 TABLET, EFFERVESCENT ORAL at 00:27

## 2025-07-19 ASSESSMENT — PAIN SCALES - GENERAL: PAINLEVEL_OUTOF10: 4

## 2025-07-19 ASSESSMENT — PAIN DESCRIPTION - LOCATION: LOCATION: LEG

## 2025-07-19 NOTE — DISCHARGE INSTR - COC
Continuity of Care Form    Patient Name: Valerie Carmona   :  1960  MRN:  [id number]    Admit date:  2025  Discharge date:  ***    Code Status Order: Prior   Advance Directives:     Admitting Physician:  No admitting provider for patient encounter.  PCP: Barb Sands MD    Discharging Nurse: ***  Discharging Hospital Unit/Room#: E5/E5  Discharging Unit Phone Number: ***    Emergency Contact:   Extended Emergency Contact Information  Primary Emergency Contact: Michelet Carmona  Address: [address] [place] [id number] [facility]  Home Phone: [phone]  Relation: Spouse  Secondary Emergency Contact: Manda Hazel   [facility]  Home Phone: [phone]  Relation: Child    Past Surgical History:  Past Surgical History:   Procedure Laterality Date     SECTION      times 4    FOOT SURGERY Right 2014    tendon repair, Todd Mclain    HYSTERECTOMY, TOTAL ABDOMINAL (CERVIX REMOVED)  ??    1 ovary remains, large fibroid and DUB but no cancer    KNEE ARTHROPLASTY Left 2021    LAPAROSCOPY Left     left knee \"cleaned out\"    TOE SURGERY Left 2024    shaved bone off Dr. Mclain @ [facility]    WRIST SURGERY Right 2024    4 point corner fusion       Immunization History:   Immunization History   Administered Date(s) Administered    COVID-19, J&J, (age 18y+), IM, 0.5 mL 2021, 2021    COVID-19, PFIZER Bivalent, DO NOT Dilute, (age 12y+), IM, 30 mcg/0.3 mL 2022    Influenza Virus Vaccine 2015, 10/10/2016, 10/01/2020, 10/22/2021, 2022    Influenza, FLUAD, (age 65 y+), IM, Quadv, 0.5mL 2023, 2024    Influenza, FLUARIX, FLULAVAL, FLUZONE (age 6 mo+) and AFLURIA, (age 3 y+), Quadv PF, 0.5mL 2017, 2018    Influenza, FLUBLOK, (age 18 y+), Quadv PF, 0.5mL 10/13/2019, 10/01/2020    Pneumococcal, PPSV23, PNEUMOVAX 23, (age 2y+), SC/IM, 0.5mL 10/19/2019    TDaP, ADACEL (age

## 2025-07-19 NOTE — ED NOTES
Pt presents to the front window with complaints of cough for the past few weeks that has gotten much worse in the past 2 days and bilateral lower leg edema, 2+, that has been going on for 2 days. Legs pain is 5/10 and sore to the touch.

## 2025-07-19 NOTE — DISCHARGE INSTRUCTIONS
Legs elevated at rest. Compression stockings during day and take off at night. Lasix as prescribed. Continue all other current medications. Schedule follow up with PCP early next week.

## 2025-07-19 NOTE — ED PROVIDER NOTES
[facility]  eMERGENCY dEPARTMENT eNCOUnter          CHIEF COMPLAINT       Chief Complaint   Patient presents with    Leg Swelling    Cough       Nurses Notes reviewed and I agree except as noted in the HPI.      HISTORY OF PRESENT ILLNESS    Valerie Carmona is a 65 y.o. female who presents with lower extremity swelling. Symptoms have worsened over last few days. Patient does not recently changed jobs at [employer]. She notes a history of leg swelling. No new medications. No shortness of breath. Notes mild cough. Notes history of multiple sclerosis.          REVIEW OF SYSTEMS     Review of Systems   Constitutional:  Negative for chills and fever.   Respiratory:  Positive for cough. Negative for shortness of breath.    Cardiovascular:  Positive for leg swelling. Negative for chest pain and palpitations.   Gastrointestinal:  Negative for nausea and vomiting.   Skin:  Negative for pallor and rash.   All other systems reviewed and are negative.        PAST MEDICAL HISTORY    has a past medical history of Arthritis, Double vision, Hypertension, and Multiple sclerosis (HCC).    SURGICAL HISTORY      has a past surgical history that includes laparoscopy (Left, ); Foot surgery (Right, 2014);  section; Hysterectomy, total abdominal (??); Knee Arthroplasty (Left, 2021); Toe Surgery (Left, 2024); and Wrist surgery (Right, 2024).    CURRENT MEDICATIONS       Previous Medications    ACETAMINOPHEN (TYLENOL) 500 MG TABLET    Take 2 tablets by mouth every 4 hours as needed    AMANTADINE (SYMMETREL) 100 MG CAPSULE    Take 1 capsule by mouth 2 times daily    ASCORBIC ACID (VITAMIN C ER PO)    Take by mouth    AZELASTINE (ASTELIN) 0.1 % NASAL SPRAY    2 sprays by Nasal route 2 times daily Use in each nostril as directed    BACLOFEN (LIORESAL) 10 MG TABLET    TAKE 1 TABLET BY MOUTH THREE TIMES DAILY    CALCIUM CARBONATE-VITAMIN D 600-200 MG-UNIT TABS    Take  by mouth 2 times daily.

## 2025-07-21 ENCOUNTER — TELEPHONE (OUTPATIENT)
Dept: FAMILY MEDICINE CLINIC | Age: 65
End: 2025-07-21

## 2025-07-21 NOTE — TELEPHONE ENCOUNTER
Pt asking for ER follow up on Wednesday.  She was seen in Er over the weekend and they are having her get a doppler done Tuesday. She is out of town Thursday and wants to see you for follow up Wednesday . Please advise

## 2025-07-21 NOTE — TELEPHONE ENCOUNTER
Appt scheduled 7/23/2025     Patient aware and voiced understanding, no concerns voiced at this time.

## 2025-07-22 ENCOUNTER — HOSPITAL ENCOUNTER (OUTPATIENT)
Dept: ULTRASOUND IMAGING | Age: 65
Discharge: HOME | End: 2025-07-22
Payer: MEDICARE

## 2025-07-22 DIAGNOSIS — R60.0 PERIPHERAL EDEMA: ICD-10-CM

## 2025-07-22 PROCEDURE — 93970 EXTREMITY STUDY: CPT

## 2025-07-22 NOTE — PROGRESS NOTES
[facility]  [facility]  [address]  [address]  [facility] [address]  Dept: [phone]  Dept Fax: [phone]  Loc: [phone]    Valerie Carmona is a 65 y.o. female who presents today for:  Chief Complaint   Patient presents with    Follow-Up from Hospital     [facility] ER follow-up from 7/18/25 for Cellulitis and Cough. Feeling a lot better, but still has the cough.        HPI:     HPI    Here for yearly checkup.    Seeing neurology for infusions for MS.      Hypertension under treatment with medications      Arthritis is controlled with OTC prn.     Seasonal allergies are increasing.  On flonase but discussed claritin daily prn.   Also can use astepro if the flonase is making her nose too dry.    History of Present Illness  The patient presents for a recheck after an emergency room visit.    A week ago, she experienced severe pain in the back of her calf, which she initially attributed to her work environment. She also had chills and a fever of 100.4°F. The following day, her fever bobby to 100.9°F, and she noticed significant swelling in her right leg, which was so severe that she could not put on her shoes. Her left leg was slightly swollen but not as much as the right. She described the sensation in her legs as if she had walked a long distance. She also noticed a sunburn-like appearance on the back of her calf. She sought medical attention at the ER where she was informed that her skin appeared yellow. She was asked if she tanned, and she replied that she had not been outside for a week. She mentioned that she gardens outdoors. She was told that her cough was due to fluid accumulation in her body. She was given potassium to drink due to low levels, but no medication was prescribed for home use. She was prescribed Keflex and Lasix to help reduce the fluid. She was advised to take Keflex 4 times a day and Lasix once a day. She was not given any potassium

## 2025-07-23 ENCOUNTER — OFFICE VISIT (OUTPATIENT)
Dept: FAMILY MEDICINE CLINIC | Age: 65
End: 2025-07-23
Payer: MEDICARE

## 2025-07-23 VITALS
HEART RATE: 102 BPM | HEIGHT: 62 IN | TEMPERATURE: 98.6 F | OXYGEN SATURATION: 96 % | DIASTOLIC BLOOD PRESSURE: 88 MMHG | WEIGHT: 179.23 LBS | RESPIRATION RATE: 18 BRPM | SYSTOLIC BLOOD PRESSURE: 136 MMHG | BODY MASS INDEX: 32.98 KG/M2

## 2025-07-23 DIAGNOSIS — I10 ESSENTIAL HYPERTENSION: Primary | ICD-10-CM

## 2025-07-23 DIAGNOSIS — S63.501D COMPLETE TEAR OF WRIST LIGAMENT, RIGHT, SUBSEQUENT ENCOUNTER: ICD-10-CM

## 2025-07-23 DIAGNOSIS — S80.12XA HEMATOMA OF LEG, LEFT, INITIAL ENCOUNTER: ICD-10-CM

## 2025-07-23 DIAGNOSIS — R06.02 SHORTNESS OF BREATH: ICD-10-CM

## 2025-07-23 DIAGNOSIS — M79.89 PAIN AND SWELLING OF LEFT LOWER LEG: ICD-10-CM

## 2025-07-23 DIAGNOSIS — H61.21 HEARING LOSS DUE TO CERUMEN IMPACTION, RIGHT: ICD-10-CM

## 2025-07-23 DIAGNOSIS — L03.116 CELLULITIS OF LEFT LOWER EXTREMITY: ICD-10-CM

## 2025-07-23 DIAGNOSIS — R42 VERTIGO: ICD-10-CM

## 2025-07-23 DIAGNOSIS — D18.09 HEMANGIOMA OF CHEST WALL: ICD-10-CM

## 2025-07-23 DIAGNOSIS — M25.531 RIGHT WRIST PAIN: ICD-10-CM

## 2025-07-23 DIAGNOSIS — J01.90 ACUTE BACTERIAL SINUSITIS: ICD-10-CM

## 2025-07-23 DIAGNOSIS — B96.89 ACUTE BACTERIAL SINUSITIS: ICD-10-CM

## 2025-07-23 DIAGNOSIS — M15.0 PRIMARY OSTEOARTHRITIS INVOLVING MULTIPLE JOINTS: ICD-10-CM

## 2025-07-23 DIAGNOSIS — I50.9 ACUTE CONGESTIVE HEART FAILURE, UNSPECIFIED HEART FAILURE TYPE (HCC): ICD-10-CM

## 2025-07-23 DIAGNOSIS — L91.8 SKIN TAG: ICD-10-CM

## 2025-07-23 DIAGNOSIS — G35 MULTIPLE SCLEROSIS (HCC): ICD-10-CM

## 2025-07-23 DIAGNOSIS — R22.42 MASS OF LEFT FOOT: ICD-10-CM

## 2025-07-23 DIAGNOSIS — J30.2 SEASONAL ALLERGIES: ICD-10-CM

## 2025-07-23 DIAGNOSIS — M85.841 OSTEOPENIA OF RIGHT HAND: ICD-10-CM

## 2025-07-23 DIAGNOSIS — M19.131 SLAC (SCAPHOLUNATE ADVANCED COLLAPSE) OF WRIST, RIGHT: ICD-10-CM

## 2025-07-23 DIAGNOSIS — M85.631: ICD-10-CM

## 2025-07-23 DIAGNOSIS — M79.662 PAIN AND SWELLING OF LEFT LOWER LEG: ICD-10-CM

## 2025-07-23 DIAGNOSIS — E87.6 HYPOKALEMIA: ICD-10-CM

## 2025-07-23 LAB
ALBUMIN SERPL BCG-MCNC: 3.7 G/DL (ref 3.4–4.9)
ALP SERPL-CCNC: 133 U/L (ref 38–126)
ALT SERPL W/O P-5'-P-CCNC: 55 U/L (ref 10–35)
ANION GAP SERPL CALC-SCNC: 13 MEQ/L (ref 8–16)
AST SERPL-CCNC: 42 U/L (ref 10–35)
BILIRUB SERPL-MCNC: 0.3 MG/DL (ref 0.3–1.2)
BUN SERPL-MCNC: 15 MG/DL (ref 8–23)
CALCIUM SERPL-MCNC: 10.1 MG/DL (ref 8.8–10.2)
CHLORIDE SERPL-SCNC: 101 MEQ/L (ref 98–111)
CO2 SERPL-SCNC: 25 MEQ/L (ref 22–29)
CREAT SERPL-MCNC: 0.9 MG/DL (ref 0.5–0.9)
DEPRECATED RDW RBC AUTO: 43.9 FL (ref 35–45)
ERYTHROCYTE [DISTWIDTH] IN BLOOD BY AUTOMATED COUNT: 13.3 % (ref 11.5–14.5)
GFR SERPL CREATININE-BSD FRML MDRD: 71 ML/MIN/1.73M2
GLUCOSE FASTING: 98 MG/DL (ref 74–109)
HCT VFR BLD AUTO: 36.2 % (ref 37–47)
HGB BLD-MCNC: 11.9 GM/DL (ref 12–16)
MAGNESIUM SERPL-MCNC: 2.2 MG/DL (ref 1.6–2.6)
MCH RBC QN AUTO: 29.5 PG (ref 26–33)
MCHC RBC AUTO-ENTMCNC: 32.9 GM/DL (ref 32.2–35.5)
MCV RBC AUTO: 89.6 FL (ref 81–99)
NT-PROBNP SERPL IA-MCNC: 195 PG/ML (ref 0–124)
PLATELET # BLD AUTO: 449 THOU/MM3 (ref 130–400)
PMV BLD AUTO: 9.2 FL (ref 9.4–12.4)
POTASSIUM SERPL-SCNC: 3.8 MEQ/L (ref 3.5–5.2)
PROT SERPL-MCNC: 7 G/DL (ref 6.4–8.3)
RBC # BLD AUTO: 4.04 MILL/MM3 (ref 4.2–5.4)
SODIUM SERPL-SCNC: 139 MEQ/L (ref 135–145)
WBC # BLD AUTO: 11.4 THOU/MM3 (ref 4.8–10.8)

## 2025-07-23 PROCEDURE — 1123F ACP DISCUSS/DSCN MKR DOCD: CPT | Performed by: FAMILY MEDICINE

## 2025-07-23 PROCEDURE — 69210 REMOVE IMPACTED EAR WAX UNI: CPT | Performed by: FAMILY MEDICINE

## 2025-07-23 PROCEDURE — 36415 COLL VENOUS BLD VENIPUNCTURE: CPT | Performed by: FAMILY MEDICINE

## 2025-07-23 PROCEDURE — 3079F DIAST BP 80-89 MM HG: CPT | Performed by: FAMILY MEDICINE

## 2025-07-23 PROCEDURE — 3075F SYST BP GE 130 - 139MM HG: CPT | Performed by: FAMILY MEDICINE

## 2025-07-23 PROCEDURE — 99215 OFFICE O/P EST HI 40 MIN: CPT | Performed by: FAMILY MEDICINE

## 2025-07-23 RX ORDER — BACLOFEN 10 MG/1
TABLET ORAL
Qty: 270 TABLET | Refills: 1 | Status: SHIPPED | OUTPATIENT
Start: 2025-07-23

## 2025-07-23 RX ORDER — GABAPENTIN 300 MG/1
300 CAPSULE ORAL DAILY
Qty: 90 CAPSULE | Refills: 1 | Status: SHIPPED | OUTPATIENT
Start: 2025-07-23 | End: 2026-01-19

## 2025-07-23 RX ORDER — CEPHALEXIN 500 MG/1
500 CAPSULE ORAL 4 TIMES DAILY
Qty: 28 CAPSULE | Refills: 0 | Status: SHIPPED | OUTPATIENT
Start: 2025-07-23 | End: 2025-07-30

## 2025-07-23 RX ORDER — LOSARTAN POTASSIUM AND HYDROCHLOROTHIAZIDE 12.5; 5 MG/1; MG/1
1 TABLET ORAL DAILY
Qty: 90 TABLET | Refills: 1 | Status: SHIPPED | OUTPATIENT
Start: 2025-07-23

## 2025-07-23 NOTE — PROGRESS NOTES
Explained ear wax removal procedure to patient with patient verbalizing understanding. Bilateral irrigation performed with warm irrigation fluid, noting  a large amount of cerumen flushed from Rt ear successfully and a small amount from the LT ear successfully. Patient tolerated well. Ear canals now clear, tympanic membranes visible.

## 2025-07-24 ENCOUNTER — RESULTS FOLLOW-UP (OUTPATIENT)
Dept: FAMILY MEDICINE CLINIC | Age: 65
End: 2025-07-24

## 2025-07-24 DIAGNOSIS — I50.9 ACUTE CONGESTIVE HEART FAILURE, UNSPECIFIED HEART FAILURE TYPE (HCC): Primary | ICD-10-CM

## 2025-07-30 ENCOUNTER — HOSPITAL ENCOUNTER (OUTPATIENT)
Age: 65
Discharge: HOME | End: 2025-08-01
Attending: FAMILY MEDICINE
Payer: MEDICARE

## 2025-07-30 DIAGNOSIS — I10 ESSENTIAL HYPERTENSION: ICD-10-CM

## 2025-07-30 DIAGNOSIS — R06.02 SHORTNESS OF BREATH: ICD-10-CM

## 2025-07-30 DIAGNOSIS — L03.116 CELLULITIS OF LEFT LOWER EXTREMITY: ICD-10-CM

## 2025-07-30 DIAGNOSIS — I50.9 ACUTE CONGESTIVE HEART FAILURE, UNSPECIFIED HEART FAILURE TYPE (HCC): ICD-10-CM

## 2025-07-30 LAB
ECHO AO ASC DIAM: 3.6 CM
ECHO AO SINUS VALSALVA DIAM: 3.2 CM
ECHO AO ST JNCT DIAM: 2.9 CM
ECHO AV AREA PEAK VELOCITY: 2 CM2
ECHO AV AREA VTI: 2.2 CM2
ECHO AV CUSP MM: 1.8 CM
ECHO AV MEAN GRADIENT: 5 MMHG
ECHO AV MEAN VELOCITY: 1 M/S
ECHO AV PEAK GRADIENT: 9 MMHG
ECHO AV PEAK VELOCITY: 1.5 M/S
ECHO AV VELOCITY RATIO: 0.67
ECHO AV VTI: 28 CM
ECHO EST RA PRESSURE: 3 MMHG
ECHO LA AREA 2C: 12.5 CM2
ECHO LA AREA 4C: 14.3 CM2
ECHO LA DIAMETER: 2.9 CM
ECHO LA MAJOR AXIS: 5.4 CM
ECHO LA MINOR AXIS: 4.3 CM
ECHO LA VOL MOD A2C: 31 ML (ref 22–52)
ECHO LA VOL MOD A4C: 30 ML (ref 22–52)
ECHO LV E' LATERAL VELOCITY: 11.2 CM/S
ECHO LV E' SEPTAL VELOCITY: 10.6 CM/S
ECHO LV EF PHYSICIAN: 55 %
ECHO LV FRACTIONAL SHORTENING: 29 % (ref 28–44)
ECHO LV INTERNAL DIMENSION DIASTOLIC: 4.5 CM (ref 3.9–5.3)
ECHO LV INTERNAL DIMENSION SYSTOLIC: 3.2 CM
ECHO LV ISOVOLUMETRIC RELAXATION TIME (IVRT): 85 MS
ECHO LV IVSD: 0.9 CM (ref 0.6–0.9)
ECHO LV MASS 2D: 132.8 G (ref 67–162)
ECHO LV POSTERIOR WALL DIASTOLIC: 0.9 CM (ref 0.6–0.9)
ECHO LV RELATIVE WALL THICKNESS RATIO: 0.4
ECHO LVOT AREA: 3.1 CM2
ECHO LVOT AV VTI INDEX: 0.7
ECHO LVOT DIAM: 2 CM
ECHO LVOT MEAN GRADIENT: 2 MMHG
ECHO LVOT PEAK GRADIENT: 4 MMHG
ECHO LVOT PEAK VELOCITY: 1 M/S
ECHO LVOT SV: 61.9 ML
ECHO LVOT VTI: 19.7 CM
ECHO MV A VELOCITY: 0.96 M/S
ECHO MV E DECELERATION TIME (DT): 211 MS
ECHO MV E VELOCITY: 0.7 M/S
ECHO MV E/A RATIO: 0.73
ECHO MV E/E' LATERAL: 6.25
ECHO MV E/E' RATIO (AVERAGED): 6.43
ECHO MV E/E' SEPTAL: 6.6
ECHO PV MAX VELOCITY: 0.8 M/S
ECHO PV PEAK GRADIENT: 2 MMHG
ECHO RIGHT VENTRICULAR SYSTOLIC PRESSURE (RVSP): 29 MMHG
ECHO RV FREE WALL PEAK S': 15.7 CM/S
ECHO RV INTERNAL DIMENSION: 2.9 CM
ECHO RV TAPSE: 1.9 CM (ref 1.7–?)
ECHO TV E WAVE: 0.6 M/S
ECHO TV REGURGITANT MAX VELOCITY: 2.55 M/S
ECHO TV REGURGITANT PEAK GRADIENT: 26 MMHG

## 2025-07-30 PROCEDURE — 93306 TTE W/DOPPLER COMPLETE: CPT

## 2025-07-30 PROCEDURE — 93306 TTE W/DOPPLER COMPLETE: CPT | Performed by: INTERNAL MEDICINE

## 2025-08-06 ENCOUNTER — LAB (OUTPATIENT)
Dept: LAB | Age: 65
End: 2025-08-06

## 2025-08-06 DIAGNOSIS — I50.9 ACUTE CONGESTIVE HEART FAILURE, UNSPECIFIED HEART FAILURE TYPE (HCC): ICD-10-CM

## 2025-08-06 LAB
ALBUMIN SERPL BCG-MCNC: 3.7 G/DL (ref 3.4–4.9)
ALP SERPL-CCNC: 125 U/L (ref 38–126)
ALT SERPL W/O P-5'-P-CCNC: 27 U/L (ref 10–35)
ANION GAP SERPL CALC-SCNC: 11 MEQ/L (ref 8–16)
AST SERPL-CCNC: 20 U/L (ref 10–35)
BILIRUB SERPL-MCNC: 0.4 MG/DL (ref 0.3–1.2)
BUN SERPL-MCNC: 31 MG/DL (ref 8–23)
CALCIUM SERPL-MCNC: 9.9 MG/DL (ref 8.8–10.2)
CHLORIDE SERPL-SCNC: 103 MEQ/L (ref 98–111)
CO2 SERPL-SCNC: 28 MEQ/L (ref 22–29)
CREAT SERPL-MCNC: 1.3 MG/DL (ref 0.5–0.9)
DEPRECATED RDW RBC AUTO: 46.1 FL (ref 35–45)
ERYTHROCYTE [DISTWIDTH] IN BLOOD BY AUTOMATED COUNT: 13.9 % (ref 11.5–14.5)
GFR SERPL CREATININE-BSD FRML MDRD: 46 ML/MIN/1.73M2
GLUCOSE SERPL-MCNC: 89 MG/DL (ref 74–109)
HCT VFR BLD AUTO: 40.9 % (ref 37–47)
HGB BLD-MCNC: 13 GM/DL (ref 12–16)
MCH RBC QN AUTO: 29.1 PG (ref 26–33)
MCHC RBC AUTO-ENTMCNC: 31.8 GM/DL (ref 32.2–35.5)
MCV RBC AUTO: 91.7 FL (ref 81–99)
NT-PROBNP SERPL IA-MCNC: 103 PG/ML (ref 0–124)
PLATELET # BLD AUTO: 469 THOU/MM3 (ref 130–400)
PMV BLD AUTO: 9.3 FL (ref 9.4–12.4)
POTASSIUM SERPL-SCNC: 4 MEQ/L (ref 3.5–5.2)
PROT SERPL-MCNC: 6.4 G/DL (ref 6.4–8.3)
RBC # BLD AUTO: 4.46 MILL/MM3 (ref 4.2–5.4)
SODIUM SERPL-SCNC: 142 MEQ/L (ref 135–145)
WBC # BLD AUTO: 10.4 THOU/MM3 (ref 4.8–10.8)

## 2025-08-07 RX ORDER — FUROSEMIDE 20 MG/1
20 TABLET ORAL DAILY
Qty: 30 TABLET | Refills: 0 | Status: SHIPPED | OUTPATIENT
Start: 2025-08-07

## 2025-08-14 ENCOUNTER — TELEPHONE (OUTPATIENT)
Dept: FAMILY MEDICINE CLINIC | Age: 65
End: 2025-08-14

## 2025-08-14 ENCOUNTER — LAB (OUTPATIENT)
Dept: LAB | Age: 65
End: 2025-08-14

## 2025-08-14 DIAGNOSIS — E87.6 HYPOKALEMIA: Primary | ICD-10-CM

## 2025-08-14 DIAGNOSIS — E87.6 HYPOKALEMIA: ICD-10-CM

## 2025-08-14 LAB
ANION GAP SERPL CALC-SCNC: 9 MEQ/L (ref 8–16)
BUN SERPL-MCNC: 17 MG/DL (ref 8–23)
CALCIUM SERPL-MCNC: 9.8 MG/DL (ref 8.5–10.5)
CHLORIDE SERPL-SCNC: 106 MEQ/L (ref 98–111)
CO2 SERPL-SCNC: 30 MEQ/L (ref 22–29)
CREAT SERPL-MCNC: 1.2 MG/DL (ref 0.5–0.9)
GFR SERPL CREATININE-BSD FRML MDRD: 50 ML/MIN/1.73M2
GLUCOSE SERPL-MCNC: 110 MG/DL (ref 74–109)
POTASSIUM SERPL-SCNC: 3.7 MEQ/L (ref 3.5–5.2)
SODIUM SERPL-SCNC: 145 MEQ/L (ref 135–145)

## 2025-08-25 ENCOUNTER — LAB (OUTPATIENT)
Dept: LAB | Age: 65
End: 2025-08-25

## 2025-08-25 DIAGNOSIS — R94.4 DECREASED GFR: ICD-10-CM

## 2025-08-25 LAB
ANION GAP SERPL CALC-SCNC: 9 MEQ/L (ref 8–16)
BUN SERPL-MCNC: 19 MG/DL (ref 8–23)
CALCIUM SERPL-MCNC: 9.4 MG/DL (ref 8.5–10.5)
CHLORIDE SERPL-SCNC: 108 MEQ/L (ref 98–111)
CO2 SERPL-SCNC: 26 MEQ/L (ref 22–29)
CREAT SERPL-MCNC: 0.9 MG/DL (ref 0.5–0.9)
GFR SERPL CREATININE-BSD FRML MDRD: 71 ML/MIN/1.73M2
GLUCOSE SERPL-MCNC: 92 MG/DL (ref 74–109)
POTASSIUM SERPL-SCNC: 3.8 MEQ/L (ref 3.5–5.2)
SODIUM SERPL-SCNC: 143 MEQ/L (ref 135–145)

## 2025-08-26 ENCOUNTER — OFFICE VISIT (OUTPATIENT)
Dept: FAMILY MEDICINE CLINIC | Age: 65
End: 2025-08-26
Payer: MEDICARE

## 2025-08-26 VITALS
BODY MASS INDEX: 32.26 KG/M2 | DIASTOLIC BLOOD PRESSURE: 78 MMHG | RESPIRATION RATE: 18 BRPM | OXYGEN SATURATION: 98 % | WEIGHT: 176.37 LBS | HEART RATE: 90 BPM | SYSTOLIC BLOOD PRESSURE: 116 MMHG | TEMPERATURE: 98 F

## 2025-08-26 DIAGNOSIS — I50.9 ACUTE CONGESTIVE HEART FAILURE, UNSPECIFIED HEART FAILURE TYPE (HCC): ICD-10-CM

## 2025-08-26 DIAGNOSIS — L03.115 CELLULITIS OF RIGHT LOWER EXTREMITY: Primary | ICD-10-CM

## 2025-08-26 DIAGNOSIS — S80.12XA HEMATOMA OF LEG, LEFT, INITIAL ENCOUNTER: ICD-10-CM

## 2025-08-26 PROCEDURE — 3078F DIAST BP <80 MM HG: CPT | Performed by: FAMILY MEDICINE

## 2025-08-26 PROCEDURE — 90677 PCV20 VACCINE IM: CPT | Performed by: FAMILY MEDICINE

## 2025-08-26 PROCEDURE — 1123F ACP DISCUSS/DSCN MKR DOCD: CPT | Performed by: FAMILY MEDICINE

## 2025-08-26 PROCEDURE — G0009 ADMIN PNEUMOCOCCAL VACCINE: HCPCS | Performed by: FAMILY MEDICINE

## 2025-08-26 PROCEDURE — 99214 OFFICE O/P EST MOD 30 MIN: CPT | Performed by: FAMILY MEDICINE

## 2025-08-26 PROCEDURE — 3074F SYST BP LT 130 MM HG: CPT | Performed by: FAMILY MEDICINE

## 2025-08-26 RX ORDER — FUROSEMIDE 20 MG/1
TABLET ORAL
Qty: 30 TABLET | Refills: 0
Start: 2025-08-26

## 2025-09-03 ENCOUNTER — LAB (OUTPATIENT)
Dept: LAB | Age: 65
End: 2025-09-03

## 2025-09-03 DIAGNOSIS — I50.9 ACUTE CONGESTIVE HEART FAILURE, UNSPECIFIED HEART FAILURE TYPE (HCC): ICD-10-CM

## 2025-09-03 DIAGNOSIS — L03.115 CELLULITIS OF RIGHT LOWER EXTREMITY: ICD-10-CM

## 2025-09-03 LAB
ANION GAP SERPL CALC-SCNC: 9 MEQ/L (ref 8–16)
BUN SERPL-MCNC: 25 MG/DL (ref 8–23)
CALCIUM SERPL-MCNC: 10.2 MG/DL (ref 8.5–10.5)
CHLORIDE SERPL-SCNC: 104 MEQ/L (ref 98–111)
CO2 SERPL-SCNC: 27 MEQ/L (ref 22–29)
CREAT SERPL-MCNC: 1 MG/DL (ref 0.5–0.9)
DEPRECATED RDW RBC AUTO: 48.2 FL (ref 35–45)
ERYTHROCYTE [DISTWIDTH] IN BLOOD BY AUTOMATED COUNT: 14.2 % (ref 11.5–14.5)
GFR SERPL CREATININE-BSD FRML MDRD: 62 ML/MIN/1.73M2
GLUCOSE SERPL-MCNC: 103 MG/DL (ref 74–109)
HCT VFR BLD AUTO: 40.1 % (ref 37–47)
HGB BLD-MCNC: 12.7 GM/DL (ref 12–16)
MCH RBC QN AUTO: 29.4 PG (ref 26–33)
MCHC RBC AUTO-ENTMCNC: 31.7 GM/DL (ref 32.2–35.5)
MCV RBC AUTO: 92.8 FL (ref 81–99)
PLATELET # BLD AUTO: 357 THOU/MM3 (ref 130–400)
PMV BLD AUTO: 9.6 FL (ref 9.4–12.4)
POTASSIUM SERPL-SCNC: 4.1 MEQ/L (ref 3.5–5.2)
RBC # BLD AUTO: 4.32 MILL/MM3 (ref 4.2–5.4)
SODIUM SERPL-SCNC: 140 MEQ/L (ref 135–145)
WBC # BLD AUTO: 8.1 THOU/MM3 (ref 4.8–10.8)

## 2025-09-04 ENCOUNTER — OFFICE VISIT (OUTPATIENT)
Dept: CARDIOLOGY CLINIC | Age: 65
End: 2025-09-04
Payer: MEDICARE

## 2025-09-04 VITALS
HEIGHT: 62 IN | DIASTOLIC BLOOD PRESSURE: 100 MMHG | SYSTOLIC BLOOD PRESSURE: 162 MMHG | HEART RATE: 96 BPM | WEIGHT: 175 LBS | BODY MASS INDEX: 32.2 KG/M2

## 2025-09-04 DIAGNOSIS — R93.1 ABNORMAL ECHOCARDIOGRAM: ICD-10-CM

## 2025-09-04 DIAGNOSIS — I10 PRIMARY HYPERTENSION: Primary | ICD-10-CM

## 2025-09-04 DIAGNOSIS — R60.0 LEG EDEMA: ICD-10-CM

## 2025-09-04 DIAGNOSIS — R06.02 SHORTNESS OF BREATH: ICD-10-CM

## 2025-09-04 PROCEDURE — 1123F ACP DISCUSS/DSCN MKR DOCD: CPT | Performed by: NUCLEAR MEDICINE

## 2025-09-04 PROCEDURE — 99204 OFFICE O/P NEW MOD 45 MIN: CPT | Performed by: NUCLEAR MEDICINE

## 2025-09-04 PROCEDURE — 3080F DIAST BP >= 90 MM HG: CPT | Performed by: NUCLEAR MEDICINE

## 2025-09-04 PROCEDURE — 3077F SYST BP >= 140 MM HG: CPT | Performed by: NUCLEAR MEDICINE

## 2025-09-04 ASSESSMENT — ENCOUNTER SYMPTOMS
BACK PAIN: 0
ABDOMINAL DISTENTION: 0
ABDOMINAL PAIN: 0
BLOOD IN STOOL: 0
DIARRHEA: 0
CONSTIPATION: 0
CHEST TIGHTNESS: 0
SHORTNESS OF BREATH: 0
COLOR CHANGE: 0
PHOTOPHOBIA: 0
VOMITING: 0
ANAL BLEEDING: 0
NAUSEA: 0
RECTAL PAIN: 0